# Patient Record
Sex: FEMALE | Race: BLACK OR AFRICAN AMERICAN | HISPANIC OR LATINO | Employment: PART TIME | URBAN - METROPOLITAN AREA
[De-identification: names, ages, dates, MRNs, and addresses within clinical notes are randomized per-mention and may not be internally consistent; named-entity substitution may affect disease eponyms.]

---

## 2020-07-06 ENCOUNTER — OFFICE VISIT (OUTPATIENT)
Dept: FAMILY MEDICINE CLINIC | Facility: CLINIC | Age: 55
End: 2020-07-06

## 2020-07-06 VITALS
BODY MASS INDEX: 29.38 KG/M2 | WEIGHT: 155.6 LBS | HEIGHT: 61 IN | DIASTOLIC BLOOD PRESSURE: 68 MMHG | SYSTOLIC BLOOD PRESSURE: 108 MMHG | OXYGEN SATURATION: 97 % | RESPIRATION RATE: 20 BRPM | HEART RATE: 78 BPM | TEMPERATURE: 98.2 F

## 2020-07-06 DIAGNOSIS — Z92.29 HISTORY OF BCG VACCINATION: ICD-10-CM

## 2020-07-06 DIAGNOSIS — Z11.1 SCREENING FOR TUBERCULOSIS: ICD-10-CM

## 2020-07-06 DIAGNOSIS — Z12.31 BREAST CANCER SCREENING BY MAMMOGRAM: ICD-10-CM

## 2020-07-06 DIAGNOSIS — Z00.00 ANNUAL PHYSICAL EXAM: Primary | ICD-10-CM

## 2020-07-06 DIAGNOSIS — Z13.31 DEPRESSION SCREEN: ICD-10-CM

## 2020-07-06 DIAGNOSIS — E66.3 OVERWEIGHT: ICD-10-CM

## 2020-07-06 DIAGNOSIS — G47.00 INSOMNIA, UNSPECIFIED TYPE: ICD-10-CM

## 2020-07-06 DIAGNOSIS — Z12.4 CERVICAL CANCER SCREENING: ICD-10-CM

## 2020-07-06 PROCEDURE — 99386 PREV VISIT NEW AGE 40-64: CPT | Performed by: PHYSICIAN ASSISTANT

## 2020-07-06 PROCEDURE — 3008F BODY MASS INDEX DOCD: CPT | Performed by: PHYSICIAN ASSISTANT

## 2020-07-06 RX ORDER — LANOLIN ALCOHOL/MO/W.PET/CERES
3 CREAM (GRAM) TOPICAL
Qty: 90 TABLET | Refills: 1 | Status: SHIPPED | OUTPATIENT
Start: 2020-07-06 | End: 2021-05-20

## 2020-07-06 NOTE — PATIENT INSTRUCTIONS
Insomnio   LO QUE NECESITA SABER:   El insomnio es tremayne afección que hace difícil conciliar el sueño o mantenerse dormido  La falta de sueño puede conllevar a problemas de atención o de la memoria donna el día  Usted también podría estar de malhumor, deprimido, torpe o tener mary de [place]  INSTRUCCIONES SOBRE EL [facility]AY HOSPITALARIA:   Pregúntele a acosta Twyla Broom vitaminas y minerales son adecuados para usted  · Maryjo síntomas no mejoran o Christal Rimes  · Josetta Hammock a usar drogas o alcohol para conciliar el sueño  · Usted tiene preguntas o inquietudes acerca de acosta condición o cuidado  Medicamentos:   · Medicamentos,  podrían ayudarle a dormir con más regularidad o ayudarlo a sentirse menos ansioso  · [place] maryjo medicamentos ronan se le haya indicado  Consulte con acosta médico si usted enrike que acosta medicamento no le está ayudando o si presenta efectos secundarios  Infórmele si es alérgico a cualquier medicamento  Mantenga tremayne lista actualizada de los [place], las vitaminas y los productos herbales que thi  Incluya los siguientes datos de los medicamentos: cantidad, frecuencia y motivo de administración  Traiga con usted la lista o los envases de la píldoras a maryjo citas de seguimiento  Lleve la lista de los medicamentos con usted en pau de tremayne emergencia  Qué puede hacer para mejorar el sueño:   · Establezca un horario para dormir  [place] le ayudará a formar tremayne rutina de sueño  Mantenga un registro de los patrones de sueño y cualquier problema para dormir que tenga  Lleve el registro a las citas de seguimiento con maryjo médicos  · No tome siestas  Las siestas podrían dificultarle que se quede dormido a la hora de acostarse por la noche  · Mantenga acosta habitación fresca, sin ruidos y [place]  [place] un ruido o tessy hurley ronan el del ventilador, para que lo relaje  No utilice acosta cama para ninguna actividad que lo mantenga despierto   No shannan ni clovis ejercicio, ni coma ni melba televisión en acosta habitación  · [place] de la cama si no se queda dormido dentro de 20 minutos  Brina Ku a otra habitación y clovis algo que lo relaje hasta que le de sueño  · Limite el consumo de cafeína, alcohol y de alimentos muy temprano en el día  Solo tome café en la mañana  No tome alcohol dentro de las 6 horas antes de [place]  No coma alimentos pesados andria antes de acostarse  · Realice actividad física con regularidad  El ejercicio diario podría ayudarlo a dormir mejor  No se ejercite dentro de las 4 horas antes de [place]  Acuda a angel consultas de control con brewster médico según le indicaron  Brewster médico podría referirlo a terapia cognitiva conductual  Un terapeuta de la conducta podría ayudarlo a encontrar mansoor gutierrez [place], de disminuir el estrés y de mejorar el sueño  Anote angel preguntas para que se acuerde de hacerlas donna angel visitas  © 2017 2600 Marc  Information is for End User's use only and may not be sold, redistributed or otherwise used for commercial purposes  All illustrations and images included in CareNotes® are the copyrighted property of A D A M , Inc  or Gatito Simmons  Esta información es sólo para uso en educación  Brewster intención no es darle un consejo médico sobre enfermedades o tratamientos  Colsulte con brewster Shama [place]orn[place] farmacéutico antes de seguir cualquier régimen médico para saber si es seguro y efectivo para usted  Melatonina (Por la boca)   Sirve para tratar el insomnio  Candis(s) : Basic's Melatonin, [facility] Melatonin, Nature's Blend Melatonin, Optimum Melatonin, PharmAssure Melatonin, Rite Aid Melatonin, [place] [employer] Melatonin   Existen muchas otras marcas de Nikita  Alexis medicamento no debe ser usado cuando:   No use alexis medicamento si alguna vez ha tenido [facility] reacción alérgica a la melatonina    [employer] de usar alexis medicamento:   Isha Castlilo Vira Jing liberación prolongada  · Acosta médico le indicara cuanto medicamento necesita usar  No use más medicamento de lo indicado  · [address] medicamento si usted está usando alexis medicamento sin prescripción médica  · [place] acosta dosis 20 minutos antes de acostarse  Usted puede tana acosta medicamento con o sin comida  · El líquido puede tomarse solo o [place] con [place]  Si tremayne dosis es olvidada:   · Llame al médico o al farmacéutico para recibir instrucciones si [employer] dosis  Forma de guardar y botar alexis medicamento:   · Guarde el medicamento en un recipiente cerrado a temperatura ambiente y alejado del calor, la humedad y la ramon directa  · Guarde todos los medicamentos fuera del alcance de los niños  Nunca comparta angel medicamentos con [employer]  · Bote las medicinas  Marlene de vencimiento haya expirado y las medicinas que ya no necesita siguiendo las instrucciones del farmacéutico, médico o paramédico   Medicamentos y alimentos que debe evitar:   Consulte con acosta médico o farmacéutico antes de usar cualquier medicamento, incluyendo los que compra sin receta médica, las vitaminas y los productos herbales  · No olvide informarle a acosta médico si también está usando medicamentos tranquilizantes o si también esta usando algún medicamento sedante  Precauciones donna el uso de alexis medicamento:   · No olvide informarle a acosta médico si está embarazada o dando de lactar (amamantando) o si tiene tremayne condición auto inmune  Infórmele también, si usted tiene tristeza o depresión  · Alexis medicamento puede causar somnolencia  Evite manejar automóvil, usar maquinarias, o hacer cualquier otra tarea que pueda ser peligrosa si usted no está alerta  Efectos secundarios que pueden presentarse donna el uso de alexis medicamento:   Consulte con el médico si nota los siguientes efectos secundarios menos graves:  · Sentir mucha pereza o cansancio en las [place]  · Dolor de raffaele    Consulte con el médico si nota otros efectos secundarios que enrike son causados por alexis medicamento  Llame a brewster médico para consultarle Teresita Patten puede notificar angel efectos secundarios al FDA al [phone]  © 2017 2600 Marc Peres Information is for End User's use only and may not be sold, redistributed or otherwise used for commercial purposes  Esta información es sólo para uso en educación  Brewster intención no es darle un consejo médico sobre enfermedades o tratamientos  Colsulte con brewster [employer] farmacéutico antes de seguir cualquier régimen médico para saber si es seguro y efectivo para usted  [address] [facility]  Phone Number: [phone]  - Please call to schedule an appointment  Thanks!

## 2020-07-06 NOTE — PROGRESS NOTES
[facility]    NAME: Wyatt Bailey  AGE: 49 y o  SEX: female  : 1965     DATE: 2020     Assessment and Plan:     Problem List Items Addressed This Visit        Other    Insomnia    Relevant Medications    melatonin 3 mg    History of BCG vaccination      Other Visit Diagnoses     Annual physical exam    -  Primary    Relevant Orders    CBC and differential    Comprehensive metabolic panel    Lipid panel    TSH, 3rd generation with Free T4 reflex    Hemoglobin A1C    Depression screen        Screening for tuberculosis        Relevant Orders    XR chest pa & lateral    Breast cancer screening by mammogram        Relevant Orders    Mammo screening bilateral w cad    Cervical cancer screening        Relevant Orders    Ambulatory referral to Obstetrics / Gynecology    Overweight            Screening for tuberculosis/history of BCG vaccination/employment physical  - Patient's employer requires tuberculosis screening   - Due to history of BCG vaccination, will order chest x-ray to screen for tuberculosis  Insomnia  - Reviewed proper sleep hygiene with patient  - Will prescribe melatonin 3 mg, to be taken once daily at bedtime  Advised patient if 3 mg is not effective, then she could try taking 2 tablets for total of 6 mg     - Will continue to monitor  Depression Screen  - Depression Screening Follow-up Plan: Patient's depression screening was positive with a PHQ-2 score of 0  Their PHQ-9 score was not indicated  Clinically patient does not have depression  No treatment is required  Immunizations and preventive care screenings were discussed with patient today  Appropriate education was printed on patient's after visit summary  Counseling:  Alcohol/drug use: discussed moderation in alcohol intake, the recommendations for healthy alcohol use, and avoidance of illicit drug use    Dental Health: discussed importance of regular tooth brushing, flossing, and dental visits  Injury prevention: discussed safety/seat belts, safety helmets, smoke detectors, carbon dioxide detectors, and smoking near bedding or upholstery  Sexual health: discussed sexually transmitted diseases, partner selection, use of condoms, avoidance of unintended pregnancy, and contraceptive alternatives  · Exercise: the importance of regular exercise/physical activity was discussed  Recommend exercise 3-5 times per week for at least 30 minutes  BMI Counseling: Body mass index is 29 89 kg/m²  The BMI is above normal  Nutrition recommendations include decreasing portion sizes, encouraging healthy choices of fruits and vegetables, consuming healthier snacks, moderation in carbohydrate intake and increasing intake of lean protein  Exercise recommendations include moderate physical activity 150 minutes/week  No pharmacotherapy was ordered  Return in about 1 year (around 7/6/2021) for Annual physical      Chief Complaint:     Chief Complaint   Patient presents with    Annual Exam      History of Present Illness:     Adult Annual Physical   Patient here for a comprehensive physical exam/establish care/work physical   Patient notes she requires to have tuberculosis screening completed for her work physical   Patient notes she has history of BCG vaccine, so she usually gets a chest x-ray completed instead  Diet and Physical Activity  · Diet/Nutrition: Average  · Exercise: walking  Depression Screening  PHQ-9 Depression Screening    PHQ-9:    Frequency of the following problems over the past two weeks:       Little interest or pleasure in doing things:  0 - not at all  Feeling down, depressed, or hopeless:  0 - not at all  PHQ-2 Score:  0       General Health  · Sleep: Patient notes for the past 2 years she has been having difficulty sleeping  Patient notes she has difficulty falling asleep    Patient notes she will go to bed and then it will usually take a few hours before she actually falls asleep  Patient notes sometimes she does not sleep at all    Patient notes she has tried reading on her phone, reading a book, reading the Bible, and listening to music, but nothing has been helpful  · Hearing: normal - bilateral   · Vision: wears glasses  · Dental: no dental visits for >1 year  /GYN Health  · Patient notes about 10-11 years ago she had a "terrible disease" of her ovary and had both of her ovaries removed  Breast Cancer Screening  · Patient notes her last mammogram was in 2019  Cervical Cancer Screening  · Patient notes her last PAP was a few years ago  Colon Cancer Screening  · Patient notes she had a colonoscopy completed about 3 years ago when she was living in [facility]  Patient notes they told her they found polyps and would repeat the colonoscopy in a few years  Review of Systems:     Review of Systems   Constitutional: Negative for appetite change, fatigue and fever  HENT: Negative for congestion, ear pain, rhinorrhea and sore throat  Eyes: Negative for pain  Respiratory: Negative for cough, chest tightness and shortness of breath  Cardiovascular: Negative for chest pain and palpitations  Gastrointestinal: Negative for abdominal pain, constipation, diarrhea, nausea and vomiting  Genitourinary: Negative for difficulty urinating and dysuria  Musculoskeletal: Negative for arthralgias  Skin: Negative for rash  Neurological: Negative for dizziness and headaches  Psychiatric/Behavioral: The patient is not nervous/anxious  Past Medical History:     History reviewed  No pertinent past medical history     Past Surgical History:     Past Surgical History:   Procedure Laterality Date    BILATERAL OOPHORECTOMY Bilateral      SECTION      x2      Social History:     Social History     Socioeconomic History    Marital status: Single     Spouse name: None    Number of children: None    Years of education: None    Highest education level: None   Occupational History    None   Social Needs    Financial resource strain: Not very hard    Food insecurity:     Worry: Sometimes true     Inability: Sometimes true   [place] needs:     Medical: None     Non-medical: None   Tobacco Use    Smoking status: Never Smoker    Smokeless tobacco: Never Used   Substance and Sexual Activity    Alcohol use: Not Currently    Drug use: Never    Sexual activity: None   Lifestyle    Physical activity:     Days per week: None     Minutes per session: None    Stress: None   Relationships    Social connections:     Talks on phone: None     Gets together: None     Attends Jew service: None     Active member of club or organization: None     Attends meetings of clubs or organizations: None     Relationship status: None    Intimate partner violence:     Fear of current or ex partner: None     Emotionally abused: None     Physically abused: None     Forced sexual activity: None   Other Topics Concern    None   Social History Narrative    None      Family History:     History reviewed  No pertinent family history  Current Medications:     Current Outpatient Medications   Medication Sig Dispense Refill    melatonin 3 mg Take 1 tablet (3 mg total) by mouth daily at bedtime 90 tablet 1     No current facility-administered medications for this visit  Allergies:     No Known Allergies   Physical Exam:     /68 (BP Location: Right arm, Patient Position: Sitting, Cuff Size: Adult)   Pulse 78   Temp 98 2 °F (36 8 °C) (Temporal)   Resp 20   Ht 5' 0 5" (1 537 m)   Wt 70 6 kg (155 lb 9 6 oz)   SpO2 97%   BMI 29 89 kg/m²     Physical Exam   Constitutional: She is oriented to person, place, and time  She appears well-developed and well-nourished  No distress  HENT:   Head: Normocephalic and atraumatic     Right Ear: External ear normal    Left Ear: External ear normal  Nose: Nose normal    Mouth/Throat: Uvula is midline, oropharynx is clear and moist and mucous membranes are normal    Eyes: Pupils are equal, round, and reactive to light  Conjunctivae and EOM are normal    Neck: Normal range of motion  Neck supple  Cardiovascular: Normal rate, regular rhythm, normal heart sounds and intact distal pulses  No murmur heard  Pulmonary/Chest: Effort normal and breath sounds normal  She has no wheezes  Abdominal: Soft  Bowel sounds are normal  There is no tenderness  Musculoskeletal: Normal range of motion  Neurological: She is alert and oriented to person, place, and time  Skin: Skin is warm and dry  Psychiatric: She has a normal mood and affect  Her speech is normal and behavior is normal    Nursing note and vitals reviewed  - Utilized [employer] for translation      CAREY Tatum

## 2020-07-07 ENCOUNTER — APPOINTMENT (OUTPATIENT)
Dept: LAB | Facility: HOSPITAL | Age: 55
End: 2020-07-07
Payer: COMMERCIAL

## 2020-07-07 ENCOUNTER — HOSPITAL ENCOUNTER (OUTPATIENT)
Dept: RADIOLOGY | Facility: HOSPITAL | Age: 55
Discharge: HOME | End: 2020-07-07
Payer: COMMERCIAL

## 2020-07-07 DIAGNOSIS — Z11.1 SCREENING FOR TUBERCULOSIS: ICD-10-CM

## 2020-07-07 DIAGNOSIS — Z00.00 ANNUAL PHYSICAL EXAM: ICD-10-CM

## 2020-07-07 LAB
ALBUMIN SERPL BCP-MCNC: 4 G/DL (ref 3–5.2)
ALP SERPL-CCNC: 69 U/L (ref 43–122)
ALT SERPL W P-5'-P-CCNC: 28 U/L (ref 9–52)
ANION GAP SERPL CALCULATED.3IONS-SCNC: 8 MMOL/L (ref 5–14)
AST SERPL W P-5'-P-CCNC: 32 U/L (ref 14–36)
BASOPHILS # BLD AUTO: 0.1 THOUSANDS/ΜL (ref 0–0.1)
BASOPHILS NFR BLD AUTO: 1 % (ref 0–1)
BILIRUB SERPL-MCNC: 0.4 MG/DL
BUN SERPL-MCNC: 20 MG/DL (ref 5–25)
CALCIUM SERPL-MCNC: 8.8 MG/DL (ref 8.4–10.2)
CHLORIDE SERPL-SCNC: 104 MMOL/L (ref 97–108)
CHOLEST SERPL-MCNC: 242 MG/DL
CO2 SERPL-SCNC: 26 MMOL/L (ref 22–30)
CREAT SERPL-MCNC: 0.63 MG/DL (ref 0.6–1.2)
EOSINOPHIL # BLD AUTO: 0.3 THOUSAND/ΜL (ref 0–0.4)
EOSINOPHIL NFR BLD AUTO: 6 % (ref 0–6)
ERYTHROCYTE [DISTWIDTH] IN BLOOD BY AUTOMATED COUNT: 13.8 %
EST. AVERAGE GLUCOSE BLD GHB EST-MCNC: 117 MG/DL
GFR SERPL CREATININE-BSD FRML MDRD: 118 ML/MIN/1.73SQ M
GLUCOSE P FAST SERPL-MCNC: 103 MG/DL (ref 70–99)
HBA1C MFR BLD: 5.7 %
HCT VFR BLD AUTO: 38.3 % (ref 36–46)
HDLC SERPL-MCNC: 52 MG/DL
HGB BLD-MCNC: 12.7 G/DL (ref 12–16)
LDLC SERPL CALC-MCNC: 170 MG/DL
LYMPHOCYTES # BLD AUTO: 2.1 THOUSANDS/ΜL (ref 0.5–4)
LYMPHOCYTES NFR BLD AUTO: 40 % (ref 25–45)
MCH RBC QN AUTO: 27.8 PG (ref 26–34)
MCHC RBC AUTO-ENTMCNC: 33.1 G/DL (ref 31–36)
MCV RBC AUTO: 84 FL (ref 80–100)
MONOCYTES # BLD AUTO: 0.5 THOUSAND/ΜL (ref 0.2–0.9)
MONOCYTES NFR BLD AUTO: 9 % (ref 1–10)
NEUTROPHILS # BLD AUTO: 2.4 THOUSANDS/ΜL (ref 1.8–7.8)
NEUTS SEG NFR BLD AUTO: 44 % (ref 45–65)
NONHDLC SERPL-MCNC: 190 MG/DL
PLATELET # BLD AUTO: 254 THOUSANDS/UL (ref 150–450)
PMV BLD AUTO: 9.6 FL (ref 8.9–12.7)
POTASSIUM SERPL-SCNC: 3.9 MMOL/L (ref 3.6–5)
PROT SERPL-MCNC: 7.4 G/DL (ref 5.9–8.4)
RBC # BLD AUTO: 4.56 MILLION/UL (ref 4–5.2)
SODIUM SERPL-SCNC: 138 MMOL/L (ref 137–147)
TRIGL SERPL-MCNC: 101 MG/DL
TSH SERPL DL<=0.05 MIU/L-ACNC: 1.54 UIU/ML (ref 0.47–4.68)
WBC # BLD AUTO: 5.3 THOUSAND/UL (ref 4.5–11)

## 2020-07-07 PROCEDURE — 80061 LIPID PANEL: CPT

## 2020-07-07 PROCEDURE — 84443 ASSAY THYROID STIM HORMONE: CPT

## 2020-07-07 PROCEDURE — 83036 HEMOGLOBIN GLYCOSYLATED A1C: CPT

## 2020-07-07 PROCEDURE — 36415 COLL VENOUS BLD VENIPUNCTURE: CPT

## 2020-07-07 PROCEDURE — 80053 COMPREHEN METABOLIC PANEL: CPT

## 2020-07-07 PROCEDURE — 71046 X-RAY EXAM CHEST 2 VIEWS: CPT

## 2020-07-07 PROCEDURE — 85025 COMPLETE CBC W/AUTO DIFF WBC: CPT

## 2020-07-20 ENCOUNTER — TELEPHONE (OUTPATIENT)
Dept: FAMILY MEDICINE CLINIC | Facility: CLINIC | Age: 55
End: 2020-07-20

## 2020-07-27 NOTE — TELEPHONE ENCOUNTER
Patient stopped by the office wanting to know the status of her paperwork  She needs it asap or she will not be able to work

## 2020-07-28 NOTE — TELEPHONE ENCOUNTER
Pt came in stating that she needs her form by tomorrow morning  I made another copy and placing it in your bin today just in case

## 2020-08-04 ENCOUNTER — TELEPHONE (OUTPATIENT)
Dept: FAMILY MEDICINE CLINIC | Facility: CLINIC | Age: 55
End: 2020-08-04

## 2020-09-01 ENCOUNTER — OFFICE VISIT (OUTPATIENT)
Dept: FAMILY MEDICINE CLINIC | Facility: CLINIC | Age: 55
End: 2020-09-01

## 2020-09-01 VITALS
HEART RATE: 71 BPM | DIASTOLIC BLOOD PRESSURE: 68 MMHG | SYSTOLIC BLOOD PRESSURE: 118 MMHG | OXYGEN SATURATION: 97 % | BODY MASS INDEX: 31.22 KG/M2 | TEMPERATURE: 97.2 F | HEIGHT: 60 IN | WEIGHT: 159 LBS | RESPIRATION RATE: 20 BRPM

## 2020-09-01 DIAGNOSIS — Z71.2 ENCOUNTER TO DISCUSS TEST RESULTS: Primary | ICD-10-CM

## 2020-09-01 DIAGNOSIS — Z12.31 BREAST CANCER SCREENING BY MAMMOGRAM: ICD-10-CM

## 2020-09-01 PROCEDURE — 99212 OFFICE O/P EST SF 10 MIN: CPT | Performed by: PHYSICIAN ASSISTANT

## 2020-09-01 PROCEDURE — 3008F BODY MASS INDEX DOCD: CPT | Performed by: PHYSICIAN ASSISTANT

## 2020-09-01 PROCEDURE — 1036F TOBACCO NON-USER: CPT | Performed by: PHYSICIAN ASSISTANT

## 2020-09-01 NOTE — PATIENT INSTRUCTIONS
Hiperlipidemia   CUIDADO AMBULATORIO:   Hiperlipidemia  son los [address] de lípidos ([facility]) en brewster bret  Estos lípidos incluyen al colesterol o triglicéridos  Los lípidos son producidos por brewster cuerpo  También provienen de los Artur Dias usted consume  Brewster cuerpo necesita lípidos para funcionar correctamente, donna los niveles altos aumentan brewster riesgo de enfermedad cardíaca, ataque al corazón y de un derrame cerebral    Llame al 911 en pau de presentar lo siguiente:   · Usted tiene alguno de los siguientes signos de un ataque cardíaco:      ¨ Estornudos, presión, o dolor en brewster pecho que dura mas de 5 minutos o regresa  ¨ Malestar o dolor en brewster espalda, faheem, mandíbula, abdomen, o brazo     ¨ Dificultad para respirar    ¨ Náuseas o vómito    ¨ Siente un desvanecimiento o tiene sudores fríos especialmente en el pecho o dificultad para respirar  · Usted tiene alguno de los siguientes signos de derrame cerebral:      ¨ Adormecimiento o caída de un lado de brewster yvette     ¨ Debilidad en un brazo o tremayne pierna    ¨ Confusión o debilidad para hablar    ¨ Mareos o dolor de raffaele intenso, o pérdida de la visión  Pregúntele a brewster [place] vitaminas y minerales son adecuados para usted  · Usted tiene preguntas o inquietudes acerca de brewster condición o cuidado  El tratamiento de la hiperlipidemia  puede incluir cambios en brewster estilo de milvia para ayudarlo a disminuir los niveles de los lípidos  Es posible que usted también necesite tana medicamentos para disminuir angel niveles de lípidos  Algunos de los cambios en brewster estilo de milvia que podrían ser necesarios incluyen los siguientes:  · Mantenga un peso saludable  Consulte con brewster médico cuánto debería pesar  [place] que lo ayude a crear un plan para bajar de peso si tiene sobrepeso  La pérdida de peso puede disminuir angel niveles de colesterol y triglicéridos  · Ejercítese según indicaciones    El ejercicio reduce los niveles de colesterol y lo [place] a mantener un peso saludable  Zac 40 minutos o más de ejercicio [facility] 3 y 4 días cada semana  Puede dividir el ejercicio en cuatro entrenamientos de 10 minutos en vez de 40 minutos a la vez  Los ejemplos de ejercicio moderado incluyen caminar enérgicamente, nadar o montar en bicicleta  Trabaje con acosta médico para planificar el mejor programa de ejercicios para usted  · No fume  La nicotina y otros químicos de los cigarrillos y cigarros pueden aumentar el riesgo de ataque cardíaco y accidente cerebrovascular  Pida información a acosta médico si usted actualmente fuma y necesita ayuda para dejar de fumar  Los cigarrillos electrónicos o tabaco sin humo todavía contienen nicotina  Consulte con acosta médico antes de [employer]  · Consuma alimentos saludables para el corazón  Hable con acosta dietista acerca de tremayne dieta saludable para el corazón  Lo siguiente le ayudará a controlar acosta hiperlipidemia:     ¨ Reduzca la cantidad total de grasa que usted consume  Elija sana magras, leche descremada o con 1% de [place] y productos lácteos bajos en grasa, ronan yogur y [place]  Limite o evite el consumo de carne Daniel Manzanares  La carne rubi es kranthi en grasas y colesterol  [id number] [facility] grasas no saludables por grasa saludables  Las grasa que no son saludables incluyen a las grasas saturadas, grasas transgénicas y el colesterol  Elija margarinas suaves que velia bajas en grasas saturadas y que tengan poca o nada de grasas transgénicas  Las grasas monoinsaturadas son grasas saludables  Estas se encuentran en el aceite de russell, el aceite de canola, el aguacate y los sanjuanita secos  Las grasas poliinsaturadas también son saludables  Estas se encuentran en el pescado, la linaza, las nueces y la soya  ¨ Consuma frutas y [employer]  Estas son bajas en calorías y [address] y son Jeffery José Miguel buena vidal de vitaminas esenciales  [address] verduras de [employer] oscuro, jorge y anaranjado   Los ejemplos incluyen espinaca, col rizada, brócoli y zanahorias  ¨ [place] alimentos ricos en fibras  Elija alimentos de granos enteros y [employer]  Las buenas larios [place] [employer] panes integrales o los cereales, los frijoles, chícharos, frutas y verduras  · Pregunte a acosta médico si usted puede tana alcohol  El alcohol puede aumentar acosta presión arterial y los niveles de triglicéridos  Un trago equivale a 12 onzas de cerveza, 5 onzas de vino o 1 onza y ½ de licor  Acuda a angel consultas de control con acosta médico según le indicaron  Es posible que necesite regresar para que le realicen más exámenes  Acosta médico podría referirlo a un nutriólogo  Anote angel preguntas para que se acuerde de hacerlas donna angel visitas  © 2017 26[address] Marc  Information is for End User's use only and may not be sold, redistributed or otherwise used for commercial purposes  All illustrations and images included in CareNotes® are the copyrighted property of A D A M , Inc  or Gatito Simmons  Esta información es sólo para uso en educación  Acosta intención no es darle un consejo médico sobre enfermedades o tratamientos  Colsulte con acosta Justin Pinta farmacéutico antes de seguir cualquier régimen médico para saber si es seguro y efectivo para usted

## 2020-09-01 NOTE — PROGRESS NOTES
Assessment/Plan:    - Reviewed blood work results with patient  Overall, blood work is normal other than mildly elevated cholesterol levels  Advised patient to follow low-fat diet and to exercise regularly, at least 30 minutes a day for 5 days a week  Will continue to monitor  Diagnoses and all orders for this visit:    Encounter to discuss test results    Breast cancer screening by mammogram  -     Mammo screening bilateral w cad; Future      All of patients questions were answered  Patient understands and agrees with the above plan  Return as needed  Madeline Lee PA-C  09/01/20  [facility]           Subjective:     Patient ID: Dima Castillo  is a 49 y o  female with known who presents today in office for blood work results  - Patient is a 49 y o  female who presents today for blood work results  Patient had recent blood work completed and would like to review the results today  Also, patient would like to have her mammogram completed  The following portions of the patient's history were reviewed and updated as appropriate: allergies, current medications, past family history, past medical history, past social history, past surgical history and problem list         Review of Systems   Constitutional: Negative for appetite change, fatigue and fever  HENT: Negative for congestion, ear pain, rhinorrhea and sore throat  Eyes: Negative for pain  Respiratory: Negative for cough, chest tightness and shortness of breath  Cardiovascular: Negative for chest pain and palpitations  Gastrointestinal: Negative for abdominal pain, constipation, diarrhea, nausea and vomiting  Genitourinary: Negative for difficulty urinating and dysuria  Musculoskeletal: Negative for myalgias  Skin: Negative for rash  Neurological: Negative for dizziness and headaches  Psychiatric/Behavioral: The patient is not nervous/anxious                    Objective:   Vitals:    09/01/20 1110   BP: 118/68 BP Location: Right arm   Patient Position: Sitting   Cuff Size: Adult   Pulse: 71   Resp: 20   Temp: (!) 97 2 °F (36 2 °C)   TempSrc: Temporal   SpO2: 97%   Weight: 72 1 kg (159 lb)   Height: 5' 0 05" (1 525 m)         Physical Exam  Vitals signs and nursing note reviewed  Constitutional:       General: She is not in acute distress  Appearance: She is well-developed  HENT:      Head: Normocephalic and atraumatic  Right Ear: External ear normal       Left Ear: External ear normal       Nose: Nose normal       Mouth/Throat:      Pharynx: Uvula midline  Eyes:      Conjunctiva/sclera: Conjunctivae normal    Neck:      Musculoskeletal: Normal range of motion and neck supple  Cardiovascular:      Rate and Rhythm: Normal rate and regular rhythm  Heart sounds: Normal heart sounds  No murmur  Pulmonary:      Effort: Pulmonary effort is normal       Breath sounds: Normal breath sounds  No wheezing  Skin:     General: Skin is warm and dry  Neurological:      Mental Status: She is alert and oriented to person, place, and time  Psychiatric:         Speech: Speech normal          Behavior: Behavior normal            - Utilized [employer] for translation

## 2020-11-27 ENCOUNTER — HOSPITAL ENCOUNTER (EMERGENCY)
Facility: HOSPITAL | Age: 55
Discharge: HOME | End: 2020-11-27
Attending: EMERGENCY MEDICINE | Admitting: EMERGENCY MEDICINE
Payer: COMMERCIAL

## 2020-11-27 VITALS
SYSTOLIC BLOOD PRESSURE: 101 MMHG | HEART RATE: 82 BPM | RESPIRATION RATE: 20 BRPM | WEIGHT: 157.5 LBS | BODY MASS INDEX: 30.71 KG/M2 | DIASTOLIC BLOOD PRESSURE: 66 MMHG | OXYGEN SATURATION: 96 % | TEMPERATURE: 99.9 F

## 2020-11-27 DIAGNOSIS — U07.1 COVID-19: Primary | ICD-10-CM

## 2020-11-27 LAB
FLUAV RNA RESP QL NAA+PROBE: NEGATIVE
FLUBV RNA RESP QL NAA+PROBE: NEGATIVE
RSV RNA RESP QL NAA+PROBE: NEGATIVE
SARS-COV-2 RNA RESP QL NAA+PROBE: POSITIVE

## 2020-11-27 PROCEDURE — 99284 EMERGENCY DEPT VISIT MOD MDM: CPT | Performed by: EMERGENCY MEDICINE

## 2020-11-27 PROCEDURE — 99283 EMERGENCY DEPT VISIT LOW MDM: CPT

## 2020-11-27 PROCEDURE — 96372 THER/PROPH/DIAG INJ SC/IM: CPT

## 2020-11-27 PROCEDURE — 0241U HB NFCT DS VIR RESP RNA 4 TRGT: CPT | Performed by: EMERGENCY MEDICINE

## 2020-11-27 RX ORDER — KETOROLAC TROMETHAMINE 30 MG/ML
30 INJECTION, SOLUTION INTRAMUSCULAR; INTRAVENOUS ONCE
Status: DISCONTINUED | OUTPATIENT
Start: 2020-11-27 | End: 2020-11-27

## 2020-11-27 RX ORDER — KETOROLAC TROMETHAMINE 30 MG/ML
30 INJECTION, SOLUTION INTRAMUSCULAR; INTRAVENOUS ONCE
Status: COMPLETED | OUTPATIENT
Start: 2020-11-27 | End: 2020-11-27

## 2020-11-27 RX ADMIN — KETOROLAC TROMETHAMINE 30 MG: 30 INJECTION, SOLUTION INTRAMUSCULAR; INTRAVENOUS at 11:19

## 2020-11-29 ENCOUNTER — APPOINTMENT (EMERGENCY)
Dept: RADIOLOGY | Facility: HOSPITAL | Age: 55
End: 2020-11-29
Payer: COMMERCIAL

## 2020-11-29 ENCOUNTER — HOSPITAL ENCOUNTER (EMERGENCY)
Facility: HOSPITAL | Age: 55
Discharge: HOME | End: 2020-11-29
Attending: EMERGENCY MEDICINE
Payer: COMMERCIAL

## 2020-11-29 VITALS
BODY MASS INDEX: 30.52 KG/M2 | OXYGEN SATURATION: 97 % | RESPIRATION RATE: 16 BRPM | WEIGHT: 156.53 LBS | DIASTOLIC BLOOD PRESSURE: 62 MMHG | HEART RATE: 76 BPM | SYSTOLIC BLOOD PRESSURE: 134 MMHG | TEMPERATURE: 98 F

## 2020-11-29 DIAGNOSIS — U07.1 COVID-19 VIRUS INFECTION: Primary | ICD-10-CM

## 2020-11-29 DIAGNOSIS — D72.819 LEUKOPENIA: ICD-10-CM

## 2020-11-29 LAB
ALBUMIN SERPL BCP-MCNC: 3.4 G/DL (ref 3.5–5)
ALP SERPL-CCNC: 69 U/L (ref 46–116)
ALT SERPL W P-5'-P-CCNC: 39 U/L (ref 12–78)
ANION GAP SERPL CALCULATED.3IONS-SCNC: 5 MMOL/L (ref 4–13)
AST SERPL W P-5'-P-CCNC: 27 U/L (ref 5–45)
BASOPHILS # BLD AUTO: 0.01 THOUSANDS/ΜL (ref 0–0.1)
BASOPHILS NFR BLD AUTO: 0 % (ref 0–1)
BILIRUB SERPL-MCNC: 0.24 MG/DL (ref 0.2–1)
BILIRUB UR QL STRIP: NEGATIVE
BUN SERPL-MCNC: 13 MG/DL (ref 5–25)
CALCIUM ALBUM COR SERPL-MCNC: 9.1 MG/DL (ref 8.3–10.1)
CALCIUM SERPL-MCNC: 8.6 MG/DL (ref 8.3–10.1)
CHLORIDE SERPL-SCNC: 103 MMOL/L (ref 100–108)
CLARITY UR: CLEAR
CLARITY, POC: CLEAR
CO2 SERPL-SCNC: 30 MMOL/L (ref 21–32)
COLOR UR: YELLOW
COLOR, POC: YELLOW
CREAT SERPL-MCNC: 0.79 MG/DL (ref 0.6–1.3)
EOSINOPHIL # BLD AUTO: 0.05 THOUSAND/ΜL (ref 0–0.61)
EOSINOPHIL NFR BLD AUTO: 2 % (ref 0–6)
ERYTHROCYTE [DISTWIDTH] IN BLOOD BY AUTOMATED COUNT: 13.8 % (ref 11.6–15.1)
GFR SERPL CREATININE-BSD FRML MDRD: 98 ML/MIN/1.73SQ M
GLUCOSE SERPL-MCNC: 98 MG/DL (ref 65–140)
GLUCOSE UR STRIP-MCNC: NEGATIVE MG/DL
HCT VFR BLD AUTO: 41.9 % (ref 34.8–46.1)
HGB BLD-MCNC: 13.2 G/DL (ref 11.5–15.4)
HGB UR QL STRIP.AUTO: NEGATIVE
IMM GRANULOCYTES # BLD AUTO: 0.01 THOUSAND/UL (ref 0–0.2)
IMM GRANULOCYTES NFR BLD AUTO: 0 % (ref 0–2)
KETONES UR STRIP-MCNC: NEGATIVE MG/DL
LEUKOCYTE ESTERASE UR QL STRIP: NEGATIVE
LIPASE SERPL-CCNC: 150 U/L (ref 73–393)
LYMPHOCYTES # BLD AUTO: 1.78 THOUSANDS/ΜL (ref 0.6–4.47)
LYMPHOCYTES NFR BLD AUTO: 59 % (ref 14–44)
MCH RBC QN AUTO: 26.6 PG (ref 26.8–34.3)
MCHC RBC AUTO-ENTMCNC: 31.5 G/DL (ref 31.4–37.4)
MCV RBC AUTO: 85 FL (ref 82–98)
MONOCYTES # BLD AUTO: 0.47 THOUSAND/ΜL (ref 0.17–1.22)
MONOCYTES NFR BLD AUTO: 16 % (ref 4–12)
NEUTROPHILS # BLD AUTO: 0.68 THOUSANDS/ΜL (ref 1.85–7.62)
NEUTS SEG NFR BLD AUTO: 23 % (ref 43–75)
NITRITE UR QL STRIP: NEGATIVE
NRBC BLD AUTO-RTO: 0 /100 WBCS
PH UR STRIP.AUTO: 6 [PH] (ref 4.5–8)
PLATELET # BLD AUTO: 226 THOUSANDS/UL (ref 149–390)
PMV BLD AUTO: 10.7 FL (ref 8.9–12.7)
POTASSIUM SERPL-SCNC: 3.9 MMOL/L (ref 3.5–5.3)
PROT SERPL-MCNC: 7.4 G/DL (ref 6.4–8.2)
PROT UR STRIP-MCNC: NEGATIVE MG/DL
RBC # BLD AUTO: 4.96 MILLION/UL (ref 3.81–5.12)
SODIUM SERPL-SCNC: 138 MMOL/L (ref 136–145)
SP GR UR STRIP.AUTO: 1.02 (ref 1–1.03)
UROBILINOGEN UR QL STRIP.AUTO: 0.2 E.U./DL
WBC # BLD AUTO: 3 THOUSAND/UL (ref 4.31–10.16)

## 2020-11-29 PROCEDURE — 99284 EMERGENCY DEPT VISIT MOD MDM: CPT

## 2020-11-29 PROCEDURE — 83690 ASSAY OF LIPASE: CPT | Performed by: PHYSICIAN ASSISTANT

## 2020-11-29 PROCEDURE — 81003 URINALYSIS AUTO W/O SCOPE: CPT

## 2020-11-29 PROCEDURE — 71045 X-RAY EXAM CHEST 1 VIEW: CPT

## 2020-11-29 PROCEDURE — 85025 COMPLETE CBC W/AUTO DIFF WBC: CPT | Performed by: PHYSICIAN ASSISTANT

## 2020-11-29 PROCEDURE — 36415 COLL VENOUS BLD VENIPUNCTURE: CPT | Performed by: PHYSICIAN ASSISTANT

## 2020-11-29 PROCEDURE — 96374 THER/PROPH/DIAG INJ IV PUSH: CPT

## 2020-11-29 PROCEDURE — 99285 EMERGENCY DEPT VISIT HI MDM: CPT | Performed by: EMERGENCY MEDICINE

## 2020-11-29 PROCEDURE — 96375 TX/PRO/DX INJ NEW DRUG ADDON: CPT

## 2020-11-29 PROCEDURE — 80053 COMPREHEN METABOLIC PANEL: CPT | Performed by: PHYSICIAN ASSISTANT

## 2020-11-29 RX ORDER — MELATONIN
2000 DAILY
Qty: 28 TABLET | Refills: 0 | Status: SHIPPED | OUTPATIENT
Start: 2020-11-29

## 2020-11-29 RX ORDER — MULTIVITAMIN
1 TABLET ORAL DAILY
Qty: 14 TABLET | Refills: 0 | Status: SHIPPED | OUTPATIENT
Start: 2020-11-29

## 2020-11-29 RX ORDER — MULTIVIT WITH MINERALS/LUTEIN
1000 TABLET ORAL EVERY 12 HOURS
Qty: 28 TABLET | Refills: 0 | Status: SHIPPED | OUTPATIENT
Start: 2020-11-29

## 2020-11-29 RX ORDER — ACETAMINOPHEN 325 MG/1
650 TABLET ORAL EVERY 6 HOURS PRN
Qty: 30 TABLET | Refills: 0 | Status: SHIPPED | OUTPATIENT
Start: 2020-11-29 | End: 2021-05-20

## 2020-11-29 RX ORDER — KETOROLAC TROMETHAMINE 30 MG/ML
15 INJECTION, SOLUTION INTRAMUSCULAR; INTRAVENOUS ONCE
Status: COMPLETED | OUTPATIENT
Start: 2020-11-29 | End: 2020-11-29

## 2020-11-29 RX ORDER — ONDANSETRON 2 MG/ML
4 INJECTION INTRAMUSCULAR; INTRAVENOUS ONCE
Status: COMPLETED | OUTPATIENT
Start: 2020-11-29 | End: 2020-11-29

## 2020-11-29 RX ORDER — ONDANSETRON 4 MG/1
4 TABLET, FILM COATED ORAL EVERY 8 HOURS PRN
Qty: 20 TABLET | Refills: 0 | Status: SHIPPED | OUTPATIENT
Start: 2020-11-29 | End: 2021-05-20

## 2020-11-29 RX ADMIN — KETOROLAC TROMETHAMINE 15 MG: 30 INJECTION, SOLUTION INTRAMUSCULAR at 17:13

## 2020-11-29 RX ADMIN — ONDANSETRON 4 MG: 2 INJECTION INTRAMUSCULAR; INTRAVENOUS at 17:12

## 2021-04-12 ENCOUNTER — OFFICE VISIT (OUTPATIENT)
Dept: FAMILY MEDICINE CLINIC | Facility: CLINIC | Age: 56
End: 2021-04-12

## 2021-04-12 VITALS
TEMPERATURE: 98.3 F | BODY MASS INDEX: 31.59 KG/M2 | DIASTOLIC BLOOD PRESSURE: 66 MMHG | HEART RATE: 74 BPM | HEIGHT: 60 IN | OXYGEN SATURATION: 96 % | SYSTOLIC BLOOD PRESSURE: 118 MMHG | WEIGHT: 160.9 LBS | RESPIRATION RATE: 20 BRPM

## 2021-04-12 DIAGNOSIS — Z12.11 COLON CANCER SCREENING: Primary | ICD-10-CM

## 2021-04-12 DIAGNOSIS — R10.84 GENERALIZED ABDOMINAL PAIN: ICD-10-CM

## 2021-04-12 PROCEDURE — 99213 OFFICE O/P EST LOW 20 MIN: CPT | Performed by: PHYSICIAN ASSISTANT

## 2021-04-12 NOTE — PROGRESS NOTES
Assessment/Plan:    Abdominal pain/ colon cancer screening   - Per patient, she had her last colonoscopy completed about 4 years ago in [facility] was advised to complete another colonoscopy in 4 years  Per patient, she also has history of H pylori     - Patient has been experiencing abdominal pain and would like to be further evaluated by Gastroenterology  - Will place referral to gastroenterology for further evaluation and management  Diagnoses and all orders for this visit:    Colon cancer screening  -     Ambulatory referral to Gastroenterology; Future    Generalized abdominal pain  -     Ambulatory referral to Gastroenterology; Future          All of patients questions were answered  Patient understands and agrees with the above plan  Return as needed  Joanne Connors PA-C  04/12/21  [facility] [place]           Subjective:     Patient ID: Anibal Sauer  is a 48 y o  female  has no past medical history on file  who presents today in office for   Referral to GI  Patient is accompanied today by her daughter who helps with translation from [place]  - Patient is a 48 y o  female who presents today for  Referral to GI  Patient notes she is due for colonoscopy and would like to be referred to gastroenterology  Patient notes she last had a colonoscopy completed about 4 years ago in [facility]  Patient notes she was told to have her next colonoscopy completed in 4 years  Patient notes she has also been experiencing some right lower quadrant abdominal pain  Patient notes that comes and goes and sometimes has associated nausea, but denies any vomiting, constipation, diarrhea, fevers, chills, urinary symptoms  Patient notes she does have history of H pylori        The following portions of the patient's history were reviewed and updated as appropriate: allergies, current medications, past family history, past medical history, past social history, past surgical history and problem list         Review of Systems   Constitutional: Negative for appetite change, fatigue and fever  HENT: Negative for congestion, ear pain, rhinorrhea and sore throat  Eyes: Negative for pain  Respiratory: Negative for cough, chest tightness and shortness of breath  Cardiovascular: Negative for chest pain and palpitations  Gastrointestinal: Positive for abdominal pain and nausea  Negative for constipation, diarrhea and vomiting  Genitourinary: Negative for difficulty urinating and dysuria  Musculoskeletal: Negative for myalgias  Skin: Negative for rash  Neurological: Negative for dizziness and headaches  Psychiatric/Behavioral: The patient is not nervous/anxious  Objective:   Vitals:    04/12/21 0945   BP: 118/66   BP Location: Left arm   Patient Position: Sitting   Cuff Size: Standard   Pulse: 74   Resp: 20   Temp: 98 3 °F (36 8 °C)   TempSrc: Temporal   SpO2: 96%   Weight: 73 kg (160 lb 14 4 oz)   Height: 5' 0 05" (1 525 m)         Physical Exam  Vitals signs and nursing note reviewed  Constitutional:       General: She is not in acute distress  Appearance: She is well-developed  HENT:      Head: Normocephalic and atraumatic  Right Ear: External ear normal       Left Ear: External ear normal       Nose: Nose normal    Eyes:      Conjunctiva/sclera: Conjunctivae normal    Neck:      Musculoskeletal: Normal range of motion and neck supple  Cardiovascular:      Rate and Rhythm: Normal rate and regular rhythm  Heart sounds: Normal heart sounds  No murmur  Pulmonary:      Effort: Pulmonary effort is normal       Breath sounds: Normal breath sounds  No wheezing  Abdominal:      General: Bowel sounds are normal       Palpations: Abdomen is soft  Tenderness: There is no abdominal tenderness  Skin:     General: Skin is warm and dry  Neurological:      Mental Status: She is alert and oriented to person, place, and time     Psychiatric:         Speech: Speech normal          Behavior: Behavior normal

## 2021-04-29 ENCOUNTER — IMMUNIZATIONS (OUTPATIENT)
Dept: FAMILY MEDICINE CLINIC | Facility: HOSPITAL | Age: 56
End: 2021-04-29

## 2021-04-29 DIAGNOSIS — Z23 ENCOUNTER FOR IMMUNIZATION: Primary | ICD-10-CM

## 2021-04-29 PROCEDURE — 91301 SARS-COV-2 / COVID-19 MRNA VACCINE (MODERNA) 100 MCG: CPT

## 2021-04-29 PROCEDURE — 0011A SARS-COV-2 / COVID-19 MRNA VACCINE (MODERNA) 100 MCG: CPT

## 2021-05-20 ENCOUNTER — APPOINTMENT (EMERGENCY)
Dept: CT IMAGING | Facility: HOSPITAL | Age: 56
DRG: 247 | End: 2021-05-20
Payer: COMMERCIAL

## 2021-05-20 ENCOUNTER — HOSPITAL ENCOUNTER (INPATIENT)
Facility: HOSPITAL | Age: 56
LOS: 3 days | Discharge: HOME | DRG: 247 | End: 2021-05-23
Attending: EMERGENCY MEDICINE | Admitting: SURGERY
Payer: COMMERCIAL

## 2021-05-20 DIAGNOSIS — K56.609 SMALL BOWEL OBSTRUCTION (HCC): Primary | ICD-10-CM

## 2021-05-20 LAB
ALBUMIN SERPL BCP-MCNC: 3.8 G/DL (ref 3.5–5)
ALP SERPL-CCNC: 95 U/L (ref 46–116)
ALT SERPL W P-5'-P-CCNC: 79 U/L (ref 12–78)
ANION GAP SERPL CALCULATED.3IONS-SCNC: 8 MMOL/L (ref 4–13)
AST SERPL W P-5'-P-CCNC: 42 U/L (ref 5–45)
ATRIAL RATE: 66 BPM
BASOPHILS # BLD AUTO: 0.03 THOUSANDS/ΜL (ref 0–0.1)
BASOPHILS NFR BLD AUTO: 0 % (ref 0–1)
BILIRUB DIRECT SERPL-MCNC: 0.12 MG/DL (ref 0–0.2)
BILIRUB SERPL-MCNC: 0.33 MG/DL (ref 0.2–1)
BILIRUB UR QL STRIP: NEGATIVE
BUN SERPL-MCNC: 14 MG/DL (ref 5–25)
CALCIUM SERPL-MCNC: 9 MG/DL (ref 8.3–10.1)
CHLORIDE SERPL-SCNC: 103 MMOL/L (ref 100–108)
CLARITY UR: CLEAR
CO2 SERPL-SCNC: 30 MMOL/L (ref 21–32)
COLOR UR: YELLOW
CREAT SERPL-MCNC: 0.74 MG/DL (ref 0.6–1.3)
EOSINOPHIL # BLD AUTO: 0.36 THOUSAND/ΜL (ref 0–0.61)
EOSINOPHIL NFR BLD AUTO: 5 % (ref 0–6)
ERYTHROCYTE [DISTWIDTH] IN BLOOD BY AUTOMATED COUNT: 14 % (ref 11.6–15.1)
GFR SERPL CREATININE-BSD FRML MDRD: 106 ML/MIN/1.73SQ M
GLUCOSE SERPL-MCNC: 114 MG/DL (ref 65–140)
GLUCOSE UR STRIP-MCNC: NEGATIVE MG/DL
HCG SERPL QL: NEGATIVE
HCT VFR BLD AUTO: 43.9 % (ref 34.8–46.1)
HGB BLD-MCNC: 14 G/DL (ref 11.5–15.4)
HGB UR QL STRIP.AUTO: NEGATIVE
IMM GRANULOCYTES # BLD AUTO: 0.03 THOUSAND/UL (ref 0–0.2)
IMM GRANULOCYTES NFR BLD AUTO: 0 % (ref 0–2)
KETONES UR STRIP-MCNC: NEGATIVE MG/DL
LACTATE SERPL-SCNC: 0.8 MMOL/L (ref 0.5–2)
LEUKOCYTE ESTERASE UR QL STRIP: NEGATIVE
LIPASE SERPL-CCNC: 216 U/L (ref 73–393)
LYMPHOCYTES # BLD AUTO: 1.45 THOUSANDS/ΜL (ref 0.6–4.47)
LYMPHOCYTES NFR BLD AUTO: 21 % (ref 14–44)
MCH RBC QN AUTO: 27 PG (ref 26.8–34.3)
MCHC RBC AUTO-ENTMCNC: 31.9 G/DL (ref 31.4–37.4)
MCV RBC AUTO: 85 FL (ref 82–98)
MONOCYTES # BLD AUTO: 0.72 THOUSAND/ΜL (ref 0.17–1.22)
MONOCYTES NFR BLD AUTO: 10 % (ref 4–12)
NEUTROPHILS # BLD AUTO: 4.4 THOUSANDS/ΜL (ref 1.85–7.62)
NEUTS SEG NFR BLD AUTO: 64 % (ref 43–75)
NITRITE UR QL STRIP: NEGATIVE
NRBC BLD AUTO-RTO: 0 /100 WBCS
P AXIS: 54 DEGREES
PH UR STRIP.AUTO: 8 [PH] (ref 4.5–8)
PLATELET # BLD AUTO: 279 THOUSANDS/UL (ref 149–390)
PMV BLD AUTO: 10.7 FL (ref 8.9–12.7)
POTASSIUM SERPL-SCNC: 4 MMOL/L (ref 3.5–5.3)
PR INTERVAL: 144 MS
PROT SERPL-MCNC: 8.1 G/DL (ref 6.4–8.2)
PROT UR STRIP-MCNC: NEGATIVE MG/DL
QRS AXIS: 43 DEGREES
QRSD INTERVAL: 80 MS
QT INTERVAL: 388 MS
QTC INTERVAL: 406 MS
RBC # BLD AUTO: 5.19 MILLION/UL (ref 3.81–5.12)
SODIUM SERPL-SCNC: 141 MMOL/L (ref 136–145)
SP GR UR STRIP.AUTO: 1.01 (ref 1–1.03)
T WAVE AXIS: -15 DEGREES
TROPONIN I SERPL-MCNC: <0.02 NG/ML
UROBILINOGEN UR QL STRIP.AUTO: 0.2 E.U./DL
VENTRICULAR RATE: 66 BPM
WBC # BLD AUTO: 6.99 THOUSAND/UL (ref 4.31–10.16)

## 2021-05-20 PROCEDURE — 96374 THER/PROPH/DIAG INJ IV PUSH: CPT

## 2021-05-20 PROCEDURE — 93010 ELECTROCARDIOGRAM REPORT: CPT | Performed by: INTERNAL MEDICINE

## 2021-05-20 PROCEDURE — 84484 ASSAY OF TROPONIN QUANT: CPT | Performed by: EMERGENCY MEDICINE

## 2021-05-20 PROCEDURE — 36415 COLL VENOUS BLD VENIPUNCTURE: CPT | Performed by: EMERGENCY MEDICINE

## 2021-05-20 PROCEDURE — 99285 EMERGENCY DEPT VISIT HI MDM: CPT | Performed by: EMERGENCY MEDICINE

## 2021-05-20 PROCEDURE — 83690 ASSAY OF LIPASE: CPT | Performed by: EMERGENCY MEDICINE

## 2021-05-20 PROCEDURE — 80048 BASIC METABOLIC PNL TOTAL CA: CPT | Performed by: EMERGENCY MEDICINE

## 2021-05-20 PROCEDURE — 74177 CT ABD & PELVIS W/CONTRAST: CPT

## 2021-05-20 PROCEDURE — 81003 URINALYSIS AUTO W/O SCOPE: CPT

## 2021-05-20 PROCEDURE — 83605 ASSAY OF LACTIC ACID: CPT | Performed by: SURGERY

## 2021-05-20 PROCEDURE — 96361 HYDRATE IV INFUSION ADD-ON: CPT

## 2021-05-20 PROCEDURE — 93005 ELECTROCARDIOGRAM TRACING: CPT

## 2021-05-20 PROCEDURE — 96375 TX/PRO/DX INJ NEW DRUG ADDON: CPT

## 2021-05-20 PROCEDURE — C9113 INJ PANTOPRAZOLE SODIUM, VIA: HCPCS | Performed by: EMERGENCY MEDICINE

## 2021-05-20 PROCEDURE — 84703 CHORIONIC GONADOTROPIN ASSAY: CPT | Performed by: EMERGENCY MEDICINE

## 2021-05-20 PROCEDURE — 99285 EMERGENCY DEPT VISIT HI MDM: CPT

## 2021-05-20 PROCEDURE — 80076 HEPATIC FUNCTION PANEL: CPT | Performed by: EMERGENCY MEDICINE

## 2021-05-20 PROCEDURE — 99223 1ST HOSP IP/OBS HIGH 75: CPT | Performed by: PHYSICIAN ASSISTANT

## 2021-05-20 PROCEDURE — 96376 TX/PRO/DX INJ SAME DRUG ADON: CPT

## 2021-05-20 PROCEDURE — 85025 COMPLETE CBC W/AUTO DIFF WBC: CPT | Performed by: EMERGENCY MEDICINE

## 2021-05-20 RX ORDER — PANTOPRAZOLE SODIUM 40 MG/1
40 INJECTION, POWDER, FOR SOLUTION INTRAVENOUS ONCE
Status: COMPLETED | OUTPATIENT
Start: 2021-05-20 | End: 2021-05-20

## 2021-05-20 RX ORDER — SODIUM CHLORIDE, SODIUM LACTATE, POTASSIUM CHLORIDE, CALCIUM CHLORIDE 600; 310; 30; 20 MG/100ML; MG/100ML; MG/100ML; MG/100ML
125 INJECTION, SOLUTION INTRAVENOUS CONTINUOUS
Status: DISCONTINUED | OUTPATIENT
Start: 2021-05-20 | End: 2021-05-22

## 2021-05-20 RX ORDER — ONDANSETRON 2 MG/ML
4 INJECTION INTRAMUSCULAR; INTRAVENOUS ONCE
Status: COMPLETED | OUTPATIENT
Start: 2021-05-20 | End: 2021-05-20

## 2021-05-20 RX ORDER — ONDANSETRON 2 MG/ML
4 INJECTION INTRAMUSCULAR; INTRAVENOUS EVERY 6 HOURS PRN
Status: DISCONTINUED | OUTPATIENT
Start: 2021-05-20 | End: 2021-05-23 | Stop reason: HOSPADM

## 2021-05-20 RX ORDER — ONDANSETRON 2 MG/ML
INJECTION INTRAMUSCULAR; INTRAVENOUS
Status: COMPLETED
Start: 2021-05-20 | End: 2021-05-20

## 2021-05-20 RX ORDER — SODIUM CHLORIDE, SODIUM LACTATE, POTASSIUM CHLORIDE, CALCIUM CHLORIDE 600; 310; 30; 20 MG/100ML; MG/100ML; MG/100ML; MG/100ML
1000 INJECTION, SOLUTION INTRAVENOUS ONCE
Status: COMPLETED | OUTPATIENT
Start: 2021-05-20 | End: 2021-05-20

## 2021-05-20 RX ADMIN — ONDANSETRON 4 MG: 2 INJECTION INTRAMUSCULAR; INTRAVENOUS at 12:04

## 2021-05-20 RX ADMIN — PANTOPRAZOLE SODIUM 40 MG: 40 INJECTION, POWDER, FOR SOLUTION INTRAVENOUS at 09:09

## 2021-05-20 RX ADMIN — MORPHINE SULFATE 1 MG: 2 INJECTION, SOLUTION INTRAMUSCULAR; INTRAVENOUS at 18:11

## 2021-05-20 RX ADMIN — ONDANSETRON 4 MG: 2 INJECTION INTRAMUSCULAR; INTRAVENOUS at 18:11

## 2021-05-20 RX ADMIN — ONDANSETRON 4 MG: 2 INJECTION INTRAMUSCULAR; INTRAVENOUS at 18:17

## 2021-05-20 RX ADMIN — SODIUM CHLORIDE, SODIUM LACTATE, POTASSIUM CHLORIDE, AND CALCIUM CHLORIDE 125 ML/HR: .6; .31; .03; .02 INJECTION, SOLUTION INTRAVENOUS at 11:00

## 2021-05-20 RX ADMIN — ONDANSETRON 4 MG: 2 INJECTION INTRAMUSCULAR; INTRAVENOUS at 09:12

## 2021-05-20 RX ADMIN — TOPICAL ANESTHETIC 2 SPRAY: 200 SPRAY DENTAL; PERIODONTAL at 11:48

## 2021-05-20 RX ADMIN — SODIUM CHLORIDE, SODIUM LACTATE, POTASSIUM CHLORIDE, AND CALCIUM CHLORIDE 1000 ML: .6; .31; .03; .02 INJECTION, SOLUTION INTRAVENOUS at 09:12

## 2021-05-20 RX ADMIN — IOHEXOL 100 ML: 350 INJECTION, SOLUTION INTRAVENOUS at 09:53

## 2021-05-20 NOTE — LETTER
[address]  Dept: [phone]    May 23, 2021     Patient: Coco Lane   YOB: 1965   Date of Visit: 5/20/2021       To Whom it May Concern:    Coco Lane is under my professional care  She was seen in the hospital from 5/20/2021   to 05/23/21  She may return to work on 5/31/2021 or sooner if feels able without limitations  If you have any questions or concerns, please don't hesitate to call           Sincerely,          Jad Quiros MD

## 2021-05-20 NOTE — ED PROVIDER NOTES
History  Chief Complaint   Patient presents with    Abdominal Pain     Patient reports mid abdominal pain since yesterday, also reports nausea and vomiting "red liquid "     49 yo F presenting for evaluation of 2 days of abdominal pain  Citizen of [place] speaking, son at bedside providing translation  Reports onset of pain yesterday after she drank hot water (water bottle that was sitting in hot car)  She c/o epigastric pain, constant, does not radiate  Tried Tylenol and Omeprazole with minimal relief  Denies history of similar  Reports 4 episodes of vomiting around 6 am (3 hours PTA), reports noticing bright red that she thought was blood  States this occurred with each episode of vomiting  No AC/AP agents  Denies dark or bloody stools  States last BM this morning  Per records, h/o H  Pylori  Seen at PCP on 21 and referred to GI, has not seen them yet  H/o  & b/l oopherectomy    MDM: 49 yo F with epigastric pain and possible hematemesis- will treat sx abdominal labs, EKG/trop, CT A/P to assess for intra-abdominal pathology, reassess, dispo accordingly             Prior to Admission Medications   Prescriptions Last Dose Informant Patient Reported? Taking? Ascorbic Acid (vitamin C) 1000 MG tablet   No Yes   Sig: Take 1 tablet (1,000 mg total) by mouth every 12 (twelve) hours   Multiple Vitamin (multivitamin) tablet   No Yes   Sig: Take 1 tablet by mouth daily   cholecalciferol (VITAMIN D3) 1,000 units tablet   No Yes   Sig: Take 2 tablets (2,000 Units total) by mouth daily      Facility-Administered Medications: None       History reviewed  No pertinent past medical history  Past Surgical History:   Procedure Laterality Date    BILATERAL OOPHORECTOMY Bilateral      SECTION      x2       History reviewed  No pertinent family history  I have reviewed and agree with the history as documented      E-Cigarette/Vaping    E-Cigarette Use Never User      E-Cigarette/Vaping Substances     Social History     Tobacco Use    Smoking status: Never Smoker    Smokeless tobacco: Never Used   Substance Use Topics    Alcohol use: Not Currently    Drug use: Never       Review of Systems   Constitutional: Negative for chills, fever and unexpected weight change  HENT: Negative for ear pain, rhinorrhea and sore throat  Eyes: Negative for pain and visual disturbance  Respiratory: Negative for cough and shortness of breath  Cardiovascular: Negative for chest pain and leg swelling  Gastrointestinal: Positive for abdominal pain, nausea and vomiting  Negative for constipation and diarrhea  Endocrine: Negative for polydipsia, polyphagia and polyuria  Genitourinary: Negative for dysuria, frequency, hematuria and urgency  Musculoskeletal: Negative for back pain, myalgias and neck pain  Skin: Negative for color change and rash  Allergic/Immunologic: Negative for environmental allergies and immunocompromised state  Neurological: Negative for dizziness, weakness, light-headedness, numbness and headaches  Hematological: Negative for adenopathy  Does not bruise/bleed easily  Psychiatric/Behavioral: Negative for agitation and confusion  All other systems reviewed and are negative  Physical Exam  Physical Exam  Vitals signs and nursing note reviewed  Constitutional:       Appearance: Normal appearance  She is well-developed  HENT:      Head: Normocephalic and atraumatic  Nose: Nose normal    Eyes:      Conjunctiva/sclera: Conjunctivae normal    Neck:      Musculoskeletal: Normal range of motion and neck supple  Cardiovascular:      Rate and Rhythm: Normal rate and regular rhythm  Heart sounds: Normal heart sounds  Pulmonary:      Effort: Pulmonary effort is normal  No respiratory distress  Breath sounds: Normal breath sounds  No stridor  No wheezing or rales  Chest:      Chest wall: No tenderness  Abdominal:      General: There is no distension        Palpations: Abdomen is soft  Tenderness: There is abdominal tenderness in the epigastric area  There is no guarding or rebound  Musculoskeletal:         General: No swelling or deformity  Skin:     General: Skin is warm and dry  Findings: No rash  Neurological:      General: No focal deficit present  Mental Status: She is alert and oriented to person, place, and time  Motor: No abnormal muscle tone  Coordination: Coordination normal    Psychiatric:         Thought Content:  Thought content normal          Judgment: Judgment normal          Vital Signs  ED Triage Vitals [05/20/21 0845]   Temperature Pulse Respirations Blood Pressure SpO2   98 1 °F (36 7 °C) 80 18 126/66 98 %      Temp Source Heart Rate Source Patient Position - Orthostatic VS BP Location FiO2 (%)   Oral Monitor Sitting Right arm --      Pain Score       8           Vitals:    05/20/21 0845 05/20/21 1035 05/20/21 1253 05/20/21 1610   BP: 126/66 119/66 135/78 155/77   Pulse: 80 71 82 76   Patient Position - Orthostatic VS: Sitting Lying Lying Lying         Visual Acuity      ED Medications  Medications   lactated ringers infusion (125 mL/hr Intravenous New Bag 5/20/21 1100)   ondansetron (ZOFRAN) injection 4 mg (has no administration in time range)   morphine injection 1 mg (has no administration in time range)   pantoprazole (PROTONIX) injection 40 mg (40 mg Intravenous Given 5/20/21 0909)   ondansetron (ZOFRAN) injection 4 mg (4 mg Intravenous Given 5/20/21 0912)   lactated ringers infusion 1,000 mL (0 mL Intravenous Stopped 5/20/21 1100)   iohexol (OMNIPAQUE) 350 MG/ML injection (SINGLE-DOSE) 100 mL (100 mL Intravenous Given 5/20/21 0953)   benzocaine (HURRICAINE) 20 % mucosal spray 2 spray (2 sprays Mucosal Given 5/20/21 1148)   ondansetron (ZOFRAN) injection 4 mg (4 mg Intravenous Given 5/20/21 1204)       Diagnostic Studies  Results Reviewed     Procedure Component Value Units Date/Time    Lactic acid, plasma [[id number]]  (Normal) Collected: 05/20/21 1118    Lab Status: Final result Specimen: Blood from Arm, Right Updated: 05/20/21 1141     LACTIC ACID 0 8 mmol/L     Narrative:      Result may be elevated if tourniquet was used during collection      Urine Macroscopic, POC [[id number]] Collected: 05/20/21 1017    Lab Status: Final result Specimen: Urine Updated: 05/20/21 1018     Color, UA Yellow     Clarity, UA Clear     pH, UA 8 0     Leukocytes, UA Negative     Nitrite, UA Negative     Protein, UA Negative mg/dl      Glucose, UA Negative mg/dl      Ketones, UA Negative mg/dl      Urobilinogen, UA 0 2 E U /dl      Bilirubin, UA Negative     Blood, UA Negative     Specific Gravity, UA 1 010    Narrative:      CLINITEK RESULT    Lipase [[id number]]  (Normal) Collected: 05/20/21 0909    Lab Status: Final result Specimen: Blood from Arm, Right Updated: 05/20/21 0941     Lipase 216 u/L     Pregnancy Test (HCG Qualitative) [[id number]]  (Normal) Collected: 05/20/21 0909    Lab Status: Final result Specimen: Blood from Arm, Right Updated: 05/20/21 0941     Preg, Serum Negative    Hepatic function panel [[id number]]  (Abnormal) Collected: 05/20/21 0909    Lab Status: Final result Specimen: Blood from Arm, Right Updated: 05/20/21 0941     Total Bilirubin 0 33 mg/dL      Bilirubin, Direct 0 12 mg/dL      Alkaline Phosphatase 95 U/L      AST 42 U/L      ALT 79 U/L      Total Protein 8 1 g/dL      Albumin 3 8 g/dL     Troponin I [[id number]]  (Normal) Collected: 05/20/21 0909    Lab Status: Final result Specimen: Blood from Arm, Right Updated: 05/20/21 0935     Troponin I <0 02 ng/mL     Basic metabolic panel [[id number]] Collected: 05/20/21 0909    Lab Status: Final result Specimen: Blood from Arm, Right Updated: 05/20/21 0933     Sodium 141 mmol/L      Potassium 4 0 mmol/L      Chloride 103 mmol/L      CO2 30 mmol/L      ANION GAP 8 mmol/L      BUN 14 mg/dL      Creatinine 0 74 mg/dL      Glucose 114 mg/dL      Calcium 9 0 mg/dL      eGFR 106 ml/min/1 73sq m     Narrative:      National Kidney Disease Foundation guidelines for Chronic Kidney Disease (CKD):     Stage 1 with normal or high GFR (GFR > 90 mL/min/1 73 square meters)    Stage 2 Mild CKD (GFR = 60-89 mL/min/1 73 square meters)    Stage 3A Moderate CKD (GFR = 45-59 mL/min/1 73 square meters)    Stage 3B Moderate CKD (GFR = 30-44 mL/min/1 73 square meters)    Stage 4 Severe CKD (GFR = 15-29 mL/min/1 73 square meters)    Stage 5 End Stage CKD (GFR <15 mL/min/1 73 square meters)  Note: GFR calculation is accurate only with a steady state creatinine    CBC and differential [[id number]]  (Abnormal) Collected: 05/20/21 0909    Lab Status: Final result Specimen: Blood from Arm, Right Updated: 05/20/21 0919     WBC 6 99 Thousand/uL      RBC 5 19 Million/uL      Hemoglobin 14 0 g/dL      Hematocrit 43 9 %      MCV 85 fL      MCH 27 0 pg      MCHC 31 9 g/dL      RDW 14 0 %      MPV 10 7 fL      Platelets 473 Thousands/uL      nRBC 0 /100 WBCs      Neutrophils Relative 64 %      Immat GRANS % 0 %      Lymphocytes Relative 21 %      Monocytes Relative 10 %      Eosinophils Relative 5 %      Basophils Relative 0 %      Neutrophils Absolute 4 40 Thousands/µL      Immature Grans Absolute 0 03 Thousand/uL      Lymphocytes Absolute 1 45 Thousands/µL      Monocytes Absolute 0 72 Thousand/µL      Eosinophils Absolute 0 36 Thousand/µL      Basophils Absolute 0 03 Thousands/µL                  CT abdomen pelvis with contrast   ED Interpretation by Polina Albright DO (05/20 1018)   FINDINGS:     ABDOMEN     LOWER CHEST:  No clinically significant abnormality identified in the visualized lower chest      LIVER/BILIARY TREE:  Unremarkable      GALLBLADDER:  No calcified gallstones  No pericholecystic inflammatory change      SPLEEN:  Unremarkable      PANCREAS:  Unremarkable      ADRENAL GLANDS:  Unremarkable      KIDNEYS/URETERS:  Unremarkable   No hydronephrosis      STOMACH AND BOWEL:  There is disproportionately dilated proximal small bowel in the left abdomen with relatively decompressed mid and distal small bowel loops  The point of transition is in the mid lower abdomen (series 2 image 53) with fecalization   within the proximal loop  There is fluid distended stomach  No pneumatosis      APPENDIX:  A normal appendix was visualized      ABDOMINOPELVIC CAVITY:  No ascites  No pneumoperitoneum  No lymphadenopathy      VESSELS:  Unremarkable for patient's age      PELVIS     REPRODUCTIVE ORGANS:  Unremarkable for patient's age      URINARY BLADDER:  Fariba Millet      ABDOMINAL WALL/INGUINAL REGIONS:  Unremarkable      OSSEOUS STRUCTURES:  No acute fracture or destructive osseous lesion      IMPRESSION:     Evidence of high-grade small bowel obstruction with the point of transition in the mid lower abdomen  No pneumatosis  Surgical consultation is recommended  Final Result by Yareli Cook MD (05/20 1015)      Evidence of high-grade small bowel obstruction with the point of transition in the mid lower abdomen  No pneumatosis  Surgical consultation is recommended                     I personally discussed this study with Cynthia Evans on 5/20/2021 at 10:12 AM                Workstation performed: [id number]                    Procedures  Procedures         ED Course  ED Course as of May 20 1623   Thu May 20, 2021   3071 EKG: NSR @ 66 bpm, normal axis, normal intervals, inferior depressions, t wave inversion III      0927 Hemoglobin: 14 0   0935 Sx constant for 24 hours, no need for delta trop   Troponin I: <0 02   1019 Messaged surgery      1052 Reviewed results, pt reports feeling better, pending call back from surgery      1202 NGT placed                HEART Risk Score      Most Recent Value   Heart Score Risk Calculator   History  0 Filed at: 05/20/2021 0924   ECG  2 Filed at: 05/20/2021 2617   Age  1 Filed at: 05/20/2021 0924   Risk Factors  0 Filed at: 05/20/2021 0924   Troponin  0 Filed at: 05/20/2021 8146   HEART Score  3 Filed at: 05/20/2021 3212                      SBIRT 20yo+      Most Recent Value   SBIRT (25 yo +)   In order to provide better care to our patients, we are screening all of our patients for alcohol and drug use  Would it be okay to ask you these screening questions? Unable to answer at this time Filed at: 05/20/2021 1012                    MDM  Number of Diagnoses or Management Options  Small bowel obstruction [facility]):   Diagnosis management comments: 47 yo F with upper abdominal pain/N/V, workup revealed SBO  Admitted to surgery service for further management       Amount and/or Complexity of Data Reviewed  Clinical lab tests: ordered and reviewed  Tests in the radiology section of CPT®: ordered and reviewed  Tests in the medicine section of CPT®: ordered and reviewed  Review and summarize past medical records: yes  Independent visualization of images, tracings, or specimens: yes        Disposition  Final diagnoses:   Small bowel obstruction ([address] )     Time reflects when diagnosis was documented in both MDM as applicable and the Disposition within this note     Time User Action Codes Description Comment    5/20/2021 11:04 AM Mart GUTIERRES Add [K56 609] Small bowel obstruction [facility])       ED Disposition     ED Disposition Condition Date/Time Comment    Admit Stable Thu May 20, 2021 11:04 AM Case was discussed with surgery and the patient's admission status was agreed to be Admission Status: inpatient status to the service of Dr Guille Rehman    None         Patient's Medications   Discharge Prescriptions    No medications on file     No discharge procedures on file      PDMP Review     None          ED Provider  Electronically Signed by           Leonor Monday, DO 05/20/21 1077

## 2021-05-20 NOTE — PLAN OF CARE
Problem: PAIN - ADULT  Goal: Verbalizes/displays adequate comfort level or baseline comfort level  Description: Interventions:  - Encourage patient to monitor pain and request assistance  - Assess pain using appropriate pain scale  - Administer analgesics based on type and severity of pain and evaluate response  - Implement non-pharmacological measures as appropriate and evaluate response  - Consider cultural and social influences on pain and pain management  - Notify physician/advanced practitioner if interventions unsuccessful or patient reports new pain  Outcome: Progressing     Problem: INFECTION - ADULT  Goal: Absence or prevention of progression during hospitalization  Description: INTERVENTIONS:  - Assess and monitor for signs and symptoms of infection  - Monitor lab/diagnostic results  - Monitor all insertion sites, i e  indwelling lines, tubes, and drains  - Monitor endotracheal if appropriate and nasal secretions for changes in amount and color  - [place] appropriate cooling/warming therapies per order  - Administer medications as ordered  - Instruct and encourage patient and family to use good hand hygiene technique  - Identify and instruct in appropriate isolation precautions for identified infection/condition  Outcome: Progressing     Problem: SAFETY ADULT  Goal: Patient will remain free of falls  Description: INTERVENTIONS:  - Assess patient frequently for physical needs  -  Identify cognitive and physical deficits and behaviors that affect risk of falls    -  [place] fall precautions as indicated by assessment   - Educate patient/family on patient safety including physical limitations  - Instruct patient to call for assistance with activity based on assessment  - Modify environment to reduce risk of injury  - Consider OT/PT consult to assist with strengthening/mobility  Outcome: Progressing

## 2021-05-20 NOTE — H&P
H&P Exam - General Surgery   [facility] [place] [facility] 48 y o  female MRN: [id number]  Unit/Bed#: ED 14 Encounter: [id number]    Assessment/Plan     Assessment:  High-grade small bowel obstruction    Plan:  Admit, NPO, N/G, IV hydration, pain Rx and anti-emetics  Serial abdominal exams    History of Present Illness     HPI:  [facility] [place] [facility] is a 48 y o  female who presents with a 24 hour history of abdominal pain associated with N/V  She claims that she saw blood in at least one of the emesis episodes  She states that she had an episode of right abdominal pain one or two months ago  The patient claims that she had normal BM's yesterday and this morning  She has a history of  and apparently hysterectomy  Reportedly she had a colonoscopy 4 years ago and is in the process of getting scheduled for colonoscopy this year  Her WBC was normal at 6 99, her lytes were WNL, and the lactic acid was 0 8  A CT scan with IV contrast shows fluid filled small bowel and stomach with a transition point in the mid abdomen  There is some fecalization of the small bowel contents  The patient was attended at bedside by Dr Savanah Willams who placed an N/G tube during her exam      Review of Systems   Constitutional: Negative  HENT:        Current complaints of URI   Eyes:        Positive for eyeglasses   Respiratory: Negative  Cardiovascular: Negative  Gastrointestinal:        Please refer to the HPI    Endocrine: Negative  Genitourinary:        Patient is s/p  X 2 and STACEY   Musculoskeletal:        Patient has known herniated lumbar disc  Skin: Negative  Allergic/Immunologic: Negative  Neurological: Negative  Hematological: Negative  Psychiatric/Behavioral: Negative  Historical Information   History reviewed  No pertinent past medical history    Past Surgical History:   Procedure Laterality Date    BILATERAL OOPHORECTOMY Bilateral      SECTION      x2     Social History Social History     Substance and Sexual Activity   Alcohol Use Not Currently     Social History     Substance and Sexual Activity   Drug Use Never     Social History     Tobacco Use   Smoking Status Never Smoker   Smokeless Tobacco Never Used     E-Cigarette/Vaping    E-Cigarette Use Never User      E-Cigarette/Vaping Substances     Family History: non-contributory    Meds/Allergies   all medications and allergies reviewed  No Known Allergies    Objective   First Vitals:   Blood Pressure: 126/66 (05/20/21 0845)  Pulse: 80 (05/20/21 0845)  Temperature: 98 1 °F (36 7 °C) (05/20/21 0845)  Temp Source: Oral (05/20/21 0845)  Respirations: 18 (05/20/21 0845)  Weight - Scale: 72 2 kg (159 lb 2 8 oz) (05/20/21 0845)  SpO2: 98 % (05/20/21 0845)    Current Vitals:   Blood Pressure: 119/66 (05/20/21 1035)  Pulse: 71 (05/20/21 1035)  Temperature: 98 1 °F (36 7 °C) (05/20/21 0845)  Temp Source: Oral (05/20/21 0845)  Respirations: 17 (05/20/21 1035)  Weight - Scale: 72 2 kg (159 lb 2 8 oz) (05/20/21 0845)  SpO2: 100 % (05/20/21 1035)      Intake/Output Summary (Last 24 hours) at 5/20/2021 1210  Last data filed at 5/20/2021 1205  Gross per 24 hour   Intake 1000 ml   Output 200 ml   Net 800 ml       Invasive Devices     Peripheral Intravenous Line            Peripheral IV 05/20/21 Right Antecubital less than 1 day          Drain            NG/OG/Enteral Tube 18 Fr Right nares less than 1 day                Physical Exam  Constitutional:       Comments: Obese, Non-English speaking  female who is currently in NAD  HENT:      Head: Normocephalic and atraumatic  Right Ear: Hearing normal       Left Ear: Hearing normal    Eyes:      General: Lids are normal  No scleral icterus  Cardiovascular:      Rate and Rhythm: Normal rate and regular rhythm  Heart sounds: S1 normal and S2 normal  No murmur     Pulmonary:      Comments: Auscultation reveals a good inspiratory effort and clear lung fields throughout  Abdominal:      Comments: There is a widened mid-line surgical scar from the umbilicus to the pubis s/p  and hysterectomy  No hernia is noted in this scar  Auscultation reveals a quiet abdomen  Palpation reveals a supple, non-tender abdomen without guarding  There is no tympany to percussion  CT scan with IV contrast shows fluid filled proximal small bowel and stomach with a transition point in the mid abdomen  There is some fecalization of the small bowel contents  Musculoskeletal:      Comments: There are no gross deformities and the patient moves all 4 extremities symmetrically  There is no calf tenderness   Lymphadenopathy:      Comments: There is no edema of the lower legs or ankles   Skin:     General: Skin is warm and dry  Neurological:      Mental Status: She is alert and oriented to person, place, and time  Cranial Nerves: Cranial nerves are intact  Psychiatric:         Attention and Perception: Attention normal          Mood and Affect: Mood and affect normal          Speech: Speech normal          Behavior: Behavior normal  Behavior is cooperative  Thought Content: Thought content normal          Cognition and Memory: Cognition normal          Judgment: Judgment normal          Lab Results:   I have personally reviewed pertinent lab results    , CBC:   Lab Results   Component Value Date    WBC 6 99 2021    HGB 14 0 2021    HCT 43 9 2021    MCV 85 2021     2021    MCH 27 0 2021    MCHC 31 9 2021    RDW 14 0 2021    MPV 10 7 2021    NRBC 0 2021   , CMP:   Lab Results   Component Value Date    SODIUM 141 2021    K 4 0 2021     2021    CO2 30 2021    BUN 14 2021    CREATININE 0 74 2021    CALCIUM 9 0 2021    AST 42 2021    ALT 79 (H) 2021    ALKPHOS 95 2021    EGFR 106 2021     Imaging: I have personally reviewed pertinent reports  and I have personally reviewed pertinent films in PACS  EKG, Pathology, and Other Studies: I have personally reviewed pertinent reports  Code Status: No Order  Advance Directive and Living Will:      Power of :    POLST:      Counseling / Coordination of Care  Total floor / unit time spent today 30 minutes  Greater than 50% of total time was spent with the patient and / or family counseling and / or coordination of care  A description of the counseling / coordination of care

## 2021-05-21 ENCOUNTER — APPOINTMENT (INPATIENT)
Dept: CT IMAGING | Facility: HOSPITAL | Age: 56
DRG: 247 | End: 2021-05-21
Payer: COMMERCIAL

## 2021-05-21 PROBLEM — K56.600 PARTIAL SMALL BOWEL OBSTRUCTION (HCC): Status: ACTIVE | Noted: 2021-05-21

## 2021-05-21 LAB
ANION GAP SERPL CALCULATED.3IONS-SCNC: 7 MMOL/L (ref 4–13)
BASOPHILS # BLD AUTO: 0.03 THOUSANDS/ΜL (ref 0–0.1)
BASOPHILS NFR BLD AUTO: 0 % (ref 0–1)
BUN SERPL-MCNC: 11 MG/DL (ref 5–25)
CALCIUM SERPL-MCNC: 8.4 MG/DL (ref 8.3–10.1)
CHLORIDE SERPL-SCNC: 104 MMOL/L (ref 100–108)
CO2 SERPL-SCNC: 30 MMOL/L (ref 21–32)
CREAT SERPL-MCNC: 0.8 MG/DL (ref 0.6–1.3)
EOSINOPHIL # BLD AUTO: 0.16 THOUSAND/ΜL (ref 0–0.61)
EOSINOPHIL NFR BLD AUTO: 2 % (ref 0–6)
ERYTHROCYTE [DISTWIDTH] IN BLOOD BY AUTOMATED COUNT: 14.1 % (ref 11.6–15.1)
GFR SERPL CREATININE-BSD FRML MDRD: 97 ML/MIN/1.73SQ M
GLUCOSE SERPL-MCNC: 117 MG/DL (ref 65–140)
HCT VFR BLD AUTO: 40.3 % (ref 34.8–46.1)
HGB BLD-MCNC: 12.7 G/DL (ref 11.5–15.4)
IMM GRANULOCYTES # BLD AUTO: 0.01 THOUSAND/UL (ref 0–0.2)
IMM GRANULOCYTES NFR BLD AUTO: 0 % (ref 0–2)
LYMPHOCYTES # BLD AUTO: 1.82 THOUSANDS/ΜL (ref 0.6–4.47)
LYMPHOCYTES NFR BLD AUTO: 27 % (ref 14–44)
MAGNESIUM SERPL-MCNC: 2.1 MG/DL (ref 1.6–2.6)
MCH RBC QN AUTO: 26.5 PG (ref 26.8–34.3)
MCHC RBC AUTO-ENTMCNC: 31.5 G/DL (ref 31.4–37.4)
MCV RBC AUTO: 84 FL (ref 82–98)
MONOCYTES # BLD AUTO: 0.65 THOUSAND/ΜL (ref 0.17–1.22)
MONOCYTES NFR BLD AUTO: 10 % (ref 4–12)
NEUTROPHILS # BLD AUTO: 4.09 THOUSANDS/ΜL (ref 1.85–7.62)
NEUTS SEG NFR BLD AUTO: 61 % (ref 43–75)
NRBC BLD AUTO-RTO: 0 /100 WBCS
PHOSPHATE SERPL-MCNC: 4.5 MG/DL (ref 2.7–4.5)
PLATELET # BLD AUTO: 258 THOUSANDS/UL (ref 149–390)
PMV BLD AUTO: 10.3 FL (ref 8.9–12.7)
POTASSIUM SERPL-SCNC: 4.1 MMOL/L (ref 3.5–5.3)
RBC # BLD AUTO: 4.79 MILLION/UL (ref 3.81–5.12)
SODIUM SERPL-SCNC: 141 MMOL/L (ref 136–145)
WBC # BLD AUTO: 6.76 THOUSAND/UL (ref 4.31–10.16)

## 2021-05-21 PROCEDURE — 80048 BASIC METABOLIC PNL TOTAL CA: CPT | Performed by: SURGERY

## 2021-05-21 PROCEDURE — 84100 ASSAY OF PHOSPHORUS: CPT | Performed by: SURGERY

## 2021-05-21 PROCEDURE — 99232 SBSQ HOSP IP/OBS MODERATE 35: CPT | Performed by: SURGERY

## 2021-05-21 PROCEDURE — 83735 ASSAY OF MAGNESIUM: CPT | Performed by: SURGERY

## 2021-05-21 PROCEDURE — 74176 CT ABD & PELVIS W/O CONTRAST: CPT

## 2021-05-21 PROCEDURE — G1004 CDSM NDSC: HCPCS

## 2021-05-21 PROCEDURE — 85025 COMPLETE CBC W/AUTO DIFF WBC: CPT | Performed by: SURGERY

## 2021-05-21 RX ORDER — DIPHENHYDRAMINE HYDROCHLORIDE 50 MG/ML
25 INJECTION INTRAMUSCULAR; INTRAVENOUS ONCE
Status: COMPLETED | OUTPATIENT
Start: 2021-05-21 | End: 2021-05-22

## 2021-05-21 RX ORDER — HYDROMORPHONE HCL IN WATER/PF 6 MG/30 ML
0.2 PATIENT CONTROLLED ANALGESIA SYRINGE INTRAVENOUS
Status: DISCONTINUED | OUTPATIENT
Start: 2021-05-21 | End: 2021-05-23 | Stop reason: HOSPADM

## 2021-05-21 RX ADMIN — HYDROMORPHONE HYDROCHLORIDE 0.2 MG: 0.2 INJECTION, SOLUTION INTRAMUSCULAR; INTRAVENOUS; SUBCUTANEOUS at 13:45

## 2021-05-21 RX ADMIN — SODIUM CHLORIDE, SODIUM LACTATE, POTASSIUM CHLORIDE, AND CALCIUM CHLORIDE 125 ML/HR: .6; .31; .03; .02 INJECTION, SOLUTION INTRAVENOUS at 11:02

## 2021-05-21 RX ADMIN — HYDROMORPHONE HYDROCHLORIDE 0.2 MG: 0.2 INJECTION, SOLUTION INTRAMUSCULAR; INTRAVENOUS; SUBCUTANEOUS at 07:38

## 2021-05-21 NOTE — QUICK NOTE
Family updated at bedside at patient's request  Explained plan to continue with conservative management and informed of plans to obtain CT with contrast this evening  They asked about plans for surgery and I reiterated that the plan is to continue with current management in hopes of avoiding surgery (which is likely the case since the patient seems to be improving), but I offered to update them if anything changes

## 2021-05-21 NOTE — PROGRESS NOTES
Progress Note - General Surgery     MANDIE  [facility] 48 y o  female MRN: [id number]  Unit/Bed#: E2 -01 Encounter: [id number]    Assessment/Plan:  [facility] [place] [facility] is doing improving partial small bowel obstruction  The      1  There is a transition point and a CT scan  Have ever her abdomen is less distended today and benign  Passing flatus  Continue bowel rest, IVF  May consider repeat CT with oral contrast in next day or so, to see for progression  Continue current management  Dr Gabriella Oropeza on for the weekend  Awaiting return of bowel function      2  VTE Prophylaxis  - Pharmacologic: Sequential compression device (Venodyne)   - Mechanical: sequential compression device    Subjective:  - Pain: denies  - Current diet: NPO  - no nausea and no vomiting  - + Flatus and no BM  - Not using incentive spirometer and Incentive spirometer within reach  - Laying in bed   -     Objective:     Vitals:   Vitals:    05/20/21 1253 05/20/21 1610 05/20/21 1812 05/20/21 2257   BP: 135/78 155/77 146/87 132/79   BP Location: Right arm Right arm Left arm Left arm   Pulse: 82 76 72 90   Resp: 18 18 18 18   Temp:   98 °F (36 7 °C) 98 3 °F (36 8 °C)   TempSrc:   Temporal Temporal   SpO2: 100% 100% 100% 97%   Weight: 72 2 kg (159 lb 2 8 oz)      Height: 5' (1 524 m)          I/O:   I/O       05/19 0701 - 05/20 0700 05/20 0701 - 05/21 0700 05/21 0701 - 05/22 0700    I V  (mL/kg)  1000 (13 9)     Total Intake(mL/kg)  1000 (13 9)     Emesis/NG output  400     Total Output  400     Net  +600                  Labs:  Results from last 7 days   Lab Units 05/21/21  0517 05/20/21  0909   WBC Thousand/uL 6 76 6 99   HEMOGLOBIN g/dL 12 7 14 0   HEMATOCRIT % 40 3 43 9   PLATELETS Thousands/uL 258 279   NEUTROS PCT % 61 64   MONOS PCT % 10 10       Results from last 7 days   Lab Units 05/21/21  0517 05/20/21  0909   SODIUM mmol/L 141 141   POTASSIUM mmol/L 4 1 4 0   CHLORIDE mmol/L 104 103   CO2 mmol/L 30 30   BUN mg/dL 11 14   CREATININE mg/dL 0 80 0 74   CALCIUM mg/dL 8 4 9 0   ALK PHOS U/L  --  95   ALT U/L  --  79*   AST U/L  --  42                     Imaging: I personally reviewed the radiology studies, my impression is as follows: As written  Exam:  General: alert, appears stated age and no distress  HEENT: Head: Normocephalic, no lesions, without obvious abnormality  Eyes: PERRL, EOM's intact  Fundi benign  , conjunctivae/corneas clear  Neck: normal, supple, no adenopathy and trachea midline  Skin: no rashes, no jaundice  Chest: Normal chest wall and respirations  Clear to auscultation  Heart[de-identified] regular rate and rhythm, S1, S2 normal, no murmur, click, rub or gallop  Abdomen: abdomen is soft without significant tenderness, masses, organomegaly or guarding  Her distention is much less today  Extremities: no redness or tenderness in the calves or thighs, no edema      Some portions of this records may have been generated with voice recognition software  There may be translation, syntax,  or grammatical errors  Occasional wrong word or "sound-a-like" substitutions may have occurred due to the inherent limitations of the voice recognition software  Read the chart carefully and recognize, using context, where substations may have occurred  If you have any questions, please contact the dictating provider for clarification or correction, as needed          Anjel Dang MD  5/21/2021

## 2021-05-21 NOTE — UTILIZATION REVIEW
Inpatient Admission Authorization Request   NOTIFICATION OF INPATIENT ADMISSION/INPATIENT AUTHORIZATION REQUEST   SERVICING FACILITY:   [address] [place] [address]  [address] [place] [address], [address]   Tax ID: [id number]  NPI: [id number]  Place of Service: Inpatient [facility]  [address]  Place of Service Code: 24     ATTENDING PROVIDER:  Attending Name and NPI#: Galen Lees, [address] [[id number]]  Address: [address] [place] [address], [address] [facility]  Phone: [phone]     UTILIZATION REVIEW CONTACT:  Paul Bautista Utilization   Network Utilization Review Department  Phone: [phone]  Fax: [phone]  Email: [email or web address]     PHYSICIAN ADVISORY SERVICES:  FOR [id number] REVIEW - MEDICAL NECESSITY DENIAL  Phone: [phone]  Fax: [phone]  Email: [email or web address]     TYPE OF REQUEST:  Inpatient Status     ADMISSION INFORMATION:  ADMISSION DATE/TIME: 5/20/21 1204  PATIENT DIAGNOSIS CODE/DESCRIPTION:  Small bowel obstruction ([address] ) [K56 609]  Abdominal pain [R10 9]  DISCHARGE DATE/TIME: No discharge date for patient encounter  DISCHARGE DISPOSITION (IF DISCHARGED): Home/Self Care     IMPORTANT INFORMATION:  Please contact the Kwesi Nugent directly with any questions or concerns regarding this request  Department voicemails are confidential     Send requests for admission clinical reviews, concurrent reviews, approvals, and administrative denials due to lack of clinical to fax [phone]

## 2021-05-21 NOTE — UTILIZATION REVIEW
Initial Clinical Review    Admission: Date/Time/Statement:   Admission Orders (From admission, onward)     Ordered        05/20/21 1204  Inpatient Admission  Once                   Orders Placed This Encounter   Procedures    Inpatient Admission     Standing Status:   Standing     Number of Occurrences:   1     Order Specific Question:   Level of Care     Answer:   Med Surg [16]     Order Specific Question:   Estimated length of stay     Answer:   More than 2 Midnights     Order Specific Question:   Certification     Answer:   I certify that inpatient services are medically necessary for this patient for a duration of greater than two midnights  See H&P and MD Progress Notes for additional information about the patient's course of treatment  ED Arrival Information     Expected Arrival Acuity Means of Arrival Escorted By Service Admission Type    - 5/20/2021 08:31 Urgent Walk-In Family Member Surgery-General Urgent    Arrival Complaint    abd pain        Chief Complaint   Patient presents with    Abdominal Pain     Patient reports mid abdominal pain since yesterday, also reports nausea and vomiting "red liquid "       Initial Presentation:   48  Y O female presents to ED from home with abdominal pain, nausea and vomiting for past  24 hrs  States she  Noticed  Blood  In at least  1 episode of emesis  Had normal  BM's for past 2 days  Labs  Reveal  Normal  WBC and lactic acid  CT scan showed SBO  NGT inserted  Admit  IP with High grade  Small bowel obstruction and plan is  NPO, NGT, pain control, serial abdominal exams,  antiemetics and IVF  Date:     5/21         Day 2:   Abdomen less distended  + flatus   - BM   Waiting  Return of bowel function  Remains NPO/bowel rest   No further nausea/vomiting  Continue  NGT and IVF         ED Triage Vitals [05/20/21 0845]   Temperature Pulse Respirations Blood Pressure SpO2   98 1 °F (36 7 °C) 80 18 126/66 98 %      Temp Source Heart Rate Source Patient Position - Orthostatic VS BP Location FiO2 (%)   Oral Monitor Sitting Right arm --      Pain Score       8          Wt Readings from Last 1 Encounters:   05/20/21 72 2 kg (159 lb 2 8 oz)     Additional Vital Signs:   97 8 °F (36 6 °C)  73  18  130/84  96 %  None (Room air)  Lying     05/20/21 2257  98 3 °F (36 8 °C)  90  18  132/79  97 %  None (Room air)  Lying   05/20/21 1812  98 °F (36 7 °C)  72  18  146/87  100 %  None (Room air)  Lying   05/20/21 1610  --  76  18  155/77  100 %  None (Room air)  Lying   05/20/21 1253  --  82  18  135/78  100 %  None (Room air)  Lying   05/20/21 1035  --  71  17  119/66  100 %  None (Room air)  Lying   05/20/21 0845  98 1 °F (36 7 °C)  80  18  126/66  98 %  None (Room air)  Sitting         Pertinent Labs/Diagnostic Test Results:   Ct  Abd/pelvis  ( 5/20)    Evidence of high-grade small bowel obstruction with the point of transition in the mid lower abdomen   No pneumatosis   Surgical consultation is recommended         Results from last 7 days   Lab Units 05/21/21  0517 05/20/21  0909   WBC Thousand/uL 6 76 6 99   HEMOGLOBIN g/dL 12 7 14 0   HEMATOCRIT % 40 3 43 9   PLATELETS Thousands/uL 258 279   NEUTROS ABS Thousands/µL 4 09 4 40         Results from last 7 days   Lab Units 05/21/21  0517 05/20/21  0909   SODIUM mmol/L 141 141   POTASSIUM mmol/L 4 1 4 0   CHLORIDE mmol/L 104 103   CO2 mmol/L 30 30   ANION GAP mmol/L 7 8   BUN mg/dL 11 14   CREATININE mg/dL 0 80 0 74   EGFR ml/min/1 73sq m 97 106   CALCIUM mg/dL 8 4 9 0   MAGNESIUM mg/dL 2 1  --    PHOSPHORUS mg/dL 4 5  --      Results from last 7 days   Lab Units 05/20/21  0909   AST U/L 42   ALT U/L 79*   ALK PHOS U/L 95   TOTAL PROTEIN g/dL 8 1   ALBUMIN g/dL 3 8   TOTAL BILIRUBIN mg/dL 0 33   BILIRUBIN DIRECT mg/dL 0 12         Results from last 7 days   Lab Units 05/21/21  0517 05/20/21  0909   GLUCOSE RANDOM mg/dL 117 114           Results from last 7 days   Lab Units 05/20/21  0909   TROPONIN I ng/mL <0 02 Results from last 7 days   Lab Units 05/20/21  1118   LACTIC ACID mmol/L 0 8           Results from last 7 days   Lab Units 05/20/21  0909   LIPASE u/L 216             Results from last 7 days   Lab Units 05/20/21  1017   CLARITY UA  Clear   COLOR UA  Yellow   SPEC GRAV UA  1 010   PH UA  8 0   GLUCOSE UA mg/dl Negative   KETONES UA mg/dl Negative   BLOOD UA  Negative   PROTEIN UA mg/dl Negative   NITRITE UA  Negative   BILIRUBIN UA  Negative   UROBILINOGEN UA E U /dl 0 2   LEUKOCYTES UA  Negative         ED Treatment:   Medication Administration from 05/20/2021 0831 to 05/20/2021 1753       Date/Time Order Dose Route Action Comments     05/20/2021 0909 pantoprazole (PROTONIX) injection 40 mg 40 mg Intravenous Given      05/20/2021 0912 ondansetron (ZOFRAN) injection 4 mg 4 mg Intravenous Given      05/20/2021 1100 lactated ringers infusion 1,000 mL 0 mL Intravenous Stopped      05/20/2021 0912 lactated ringers infusion 1,000 mL 1,000 mL Intravenous New Bag      05/20/2021 0953 iohexol (OMNIPAQUE) 350 MG/ML injection (SINGLE-DOSE) 100 mL 100 mL Intravenous Given      05/20/2021 1100 lactated ringers infusion 125 mL/hr Intravenous New Bag      05/20/2021 1148 benzocaine (HURRICAINE) 20 % mucosal spray 2 spray 2 spray Mucosal Given      05/20/2021 1204 ondansetron (ZOFRAN) injection 4 mg 4 mg Intravenous Given         History reviewed  No pertinent past medical history    Present on Admission:   Partial small bowel obstruction (HCC)      Admitting Diagnosis: Small bowel obstruction (HCC) [K56 609]  Abdominal pain [R10 9]  Age/Sex: 48 y o  female  Admission Orders:  Scheduled Medications:     Continuous IV Infusions:  lactated ringers, 125 mL/hr, Intravenous, Continuous      PRN Meds:  HYDROmorphone, 0 2 mg, Intravenous, Q3H PRN  morphine injection, 1 mg, Intravenous, Q4H PRN   ( x1  5/20)   ondansetron, 4 mg, Intravenous, Q6H PRN  ( x2  5/20  And  X 1  5/21 thus far)      NGT    Network Utilization Review Department  ATTENTION: Please call with any questions or concerns to [phone] and carefully listen to the prompts so that you are directed to the right person  All voicemails are confidential   Stewart Pastel all requests for admission clinical reviews, approved or denied determinations and any other requests to dedicated fax number below belonging to the campus where the patient is receiving treatment   List of dedicated fax numbers for the Facilities:  [address] DENIALS (Administrative/Medical Necessity) [phone]   [address] (Maternity/NICU/Pediatrics) [phone]   [address] [place] [address] [address] [address] [facility] [place] [address] [phone]

## 2021-05-22 LAB
ANION GAP SERPL CALCULATED.3IONS-SCNC: 9 MMOL/L (ref 4–13)
BUN SERPL-MCNC: 7 MG/DL (ref 5–25)
CALCIUM SERPL-MCNC: 8.5 MG/DL (ref 8.3–10.1)
CHLORIDE SERPL-SCNC: 103 MMOL/L (ref 100–108)
CO2 SERPL-SCNC: 27 MMOL/L (ref 21–32)
CREAT SERPL-MCNC: 0.59 MG/DL (ref 0.6–1.3)
ERYTHROCYTE [DISTWIDTH] IN BLOOD BY AUTOMATED COUNT: 13.9 % (ref 11.6–15.1)
GFR SERPL CREATININE-BSD FRML MDRD: 120 ML/MIN/1.73SQ M
GLUCOSE SERPL-MCNC: 101 MG/DL (ref 65–140)
HCT VFR BLD AUTO: 39.5 % (ref 34.8–46.1)
HGB BLD-MCNC: 12.4 G/DL (ref 11.5–15.4)
MCH RBC QN AUTO: 26.9 PG (ref 26.8–34.3)
MCHC RBC AUTO-ENTMCNC: 31.4 G/DL (ref 31.4–37.4)
MCV RBC AUTO: 86 FL (ref 82–98)
PLATELET # BLD AUTO: 235 THOUSANDS/UL (ref 149–390)
PMV BLD AUTO: 10.8 FL (ref 8.9–12.7)
POTASSIUM SERPL-SCNC: 3.7 MMOL/L (ref 3.5–5.3)
RBC # BLD AUTO: 4.61 MILLION/UL (ref 3.81–5.12)
SODIUM SERPL-SCNC: 139 MMOL/L (ref 136–145)
WBC # BLD AUTO: 8.23 THOUSAND/UL (ref 4.31–10.16)

## 2021-05-22 PROCEDURE — 80048 BASIC METABOLIC PNL TOTAL CA: CPT | Performed by: SURGERY

## 2021-05-22 PROCEDURE — 85027 COMPLETE CBC AUTOMATED: CPT | Performed by: SURGERY

## 2021-05-22 PROCEDURE — NC001 PR NO CHARGE: Performed by: SURGERY

## 2021-05-22 RX ORDER — LANOLIN ALCOHOL/MO/W.PET/CERES
3 CREAM (GRAM) TOPICAL
Status: DISCONTINUED | OUTPATIENT
Start: 2021-05-22 | End: 2021-05-23 | Stop reason: HOSPADM

## 2021-05-22 RX ORDER — AMOXICILLIN 250 MG
1 CAPSULE ORAL
Status: DISCONTINUED | OUTPATIENT
Start: 2021-05-22 | End: 2021-05-23 | Stop reason: HOSPADM

## 2021-05-22 RX ADMIN — Medication 2 SPRAY: at 00:12

## 2021-05-22 RX ADMIN — MELATONIN 3 MG: at 21:40

## 2021-05-22 RX ADMIN — Medication 2 SPRAY: at 09:14

## 2021-05-22 RX ADMIN — DIPHENHYDRAMINE HYDROCHLORIDE 25 MG: 50 INJECTION, SOLUTION INTRAMUSCULAR; INTRAVENOUS at 00:12

## 2021-05-22 RX ADMIN — DOCUSATE SODIUM 50 MG AND SENNOSIDES 8.6 MG 1 TABLET: 8.6; 5 TABLET, FILM COATED ORAL at 21:40

## 2021-05-22 RX ADMIN — DOCUSATE SODIUM 50 MG AND SENNOSIDES 8.6 MG 1 TABLET: 8.6; 5 TABLET, FILM COATED ORAL at 09:20

## 2021-05-22 NOTE — CASE MANAGEMENT
Dx: Partial SBO  Advancing diet  CM met with pt at bedside  Pt speaks Mongolian  CM utilized the blue  phone  Assisted by Drea Parker # [id number]  Pt lives with her son  She reported that she is ADL independent  She ambulates on her own without an assistive device  No dme needs  She reported no hx of VNA or SNF  She feels no need for VNA at this time  Pt denied D&A use  She denied tobacco use  She reported no MH concerns  She follows up with PCP, Dr Emma Meadows  She uses [address] [place] [address] Pharmacy in [facility]  She has prescription coverage through [employer]  She reported no POA or LW  Her son, Timi Tillman is her emergency contact (P) [phone]  Pt drives but does not have a car  She uses public bus for transportation  Her son assists with transportation as well  She said that her son will transport her home at discharge

## 2021-05-22 NOTE — PROGRESS NOTES
Progress Note - General Surgery     MANDIE  [facility] 48 y o  female MRN: [id number]  Unit/Bed#: E2 -01 Encounter: [id number]    Assessment/Plan:  [facility] is doing improving partial small bowel obstruction  PO contrast study yesterday passed with resolution of bowel obstruction  Clear liquid diet started, will advance as tolerated  D/c fluids  Anticipate discharge tomorrow    2  VTE Prophylaxis  - Pharmacologic: Sequential compression device (Venodyne)   - Mechanical: sequential compression device    Subjective:  No acute events  Had a bowel movement   Passed PO contrast challenge    Objective:     Vitals:   Vitals:    05/21/21 0826 05/21/21 1551 05/22/21 0002 05/22/21 0752   BP: 130/84 138/79 131/63 121/67   BP Location: Left arm Left arm Left arm Left arm   Pulse: 73 72 78 71   Resp: 18 18 18 18   Temp: 97 8 °F (36 6 °C) (!) 97 1 °F (36 2 °C) 99 °F (37 2 °C) (!) 97 1 °F (36 2 °C)   TempSrc: Temporal Temporal Tympanic Temporal   SpO2: 96% 97% 97% 96%   Weight:       Height:           I/O:   I/O       05/19 0701 - 05/20 0700 05/20 0701 - 05/21 0700 05/21 0701 - 05/22 0700    I V  (mL/kg)  1000 (13 9)     Total Intake(mL/kg)  1000 (13 9)     Emesis/NG output  400     Total Output  400     Net  +600                  Labs:  Results from last 7 days   Lab Units 05/22/21  0452 05/21/21  0517 05/20/21  0909   WBC Thousand/uL 8 23 6 76 6 99   HEMOGLOBIN g/dL 12 4 12 7 14 0   HEMATOCRIT % 39 5 40 3 43 9   PLATELETS Thousands/uL 235 258 279   NEUTROS PCT %  --  61 64   MONOS PCT %  --  10 10       Results from last 7 days   Lab Units 05/22/21  0533 05/21/21  0517 05/20/21  0909   SODIUM mmol/L 139 141 141   POTASSIUM mmol/L 3 7 4 1 4 0   CHLORIDE mmol/L 103 104 103   CO2 mmol/L 27 30 30   BUN mg/dL 7 11 14   CREATININE mg/dL 0 59* 0 80 0 74   CALCIUM mg/dL 8 5 8 4 9 0   ALK PHOS U/L  --   --  95   ALT U/L  --   --  79*   AST U/L  --   --  42                     Imaging: I personally reviewed the radiology studies, my impression is as follows: As written  Physical Exam: General: AAOx3  Head: normocephalic, atraumatic  Neck: supple, trachea midline  Respiratory: BS b/l  Abdomen: Soft, NT, no rebound/guarding  Heart: RRR, S1s2  Ext: Warm no cyanosis           Some portions of this records may have been generated with voice recognition software  There may be translation, syntax,  or grammatical errors  Occasional wrong word or "sound-a-like" substitutions may have occurred due to the inherent limitations of the voice recognition software  Read the chart carefully and recognize, using context, where substations may have occurred  If you have any questions, please contact the dictating provider for clarification or correction, as needed          Idalia Austin MD  5/22/2021

## 2021-05-23 VITALS
HEART RATE: 70 BPM | TEMPERATURE: 97.2 F | OXYGEN SATURATION: 97 % | DIASTOLIC BLOOD PRESSURE: 68 MMHG | HEIGHT: 60 IN | RESPIRATION RATE: 18 BRPM | SYSTOLIC BLOOD PRESSURE: 112 MMHG | WEIGHT: 159.17 LBS | BODY MASS INDEX: 31.25 KG/M2

## 2021-05-23 LAB
ANION GAP SERPL CALCULATED.3IONS-SCNC: 10 MMOL/L (ref 4–13)
BUN SERPL-MCNC: 10 MG/DL (ref 5–25)
CALCIUM SERPL-MCNC: 8.8 MG/DL (ref 8.3–10.1)
CHLORIDE SERPL-SCNC: 104 MMOL/L (ref 100–108)
CO2 SERPL-SCNC: 28 MMOL/L (ref 21–32)
CREAT SERPL-MCNC: 0.69 MG/DL (ref 0.6–1.3)
GFR SERPL CREATININE-BSD FRML MDRD: 114 ML/MIN/1.73SQ M
GLUCOSE SERPL-MCNC: 97 MG/DL (ref 65–140)
POTASSIUM SERPL-SCNC: 3.6 MMOL/L (ref 3.5–5.3)
SODIUM SERPL-SCNC: 142 MMOL/L (ref 136–145)

## 2021-05-23 PROCEDURE — NC001 PR NO CHARGE: Performed by: SURGERY

## 2021-05-23 PROCEDURE — 80048 BASIC METABOLIC PNL TOTAL CA: CPT | Performed by: SURGERY

## 2021-05-23 RX ORDER — GUAIFENESIN 600 MG
600 TABLET, EXTENDED RELEASE 12 HR ORAL EVERY 12 HOURS SCHEDULED
Status: DISCONTINUED | OUTPATIENT
Start: 2021-05-23 | End: 2021-05-23 | Stop reason: HOSPADM

## 2021-05-23 RX ORDER — GUAIFENESIN 600 MG
600 TABLET, EXTENDED RELEASE 12 HR ORAL EVERY 12 HOURS SCHEDULED
Qty: 8 TABLET | Refills: 0 | Status: SHIPPED | OUTPATIENT
Start: 2021-05-23 | End: 2021-05-27

## 2021-05-23 RX ADMIN — GUAIFENESIN 600 MG: 600 TABLET ORAL at 07:19

## 2021-05-23 NOTE — PLAN OF CARE
Problem: PAIN - ADULT  Goal: Verbalizes/displays adequate comfort level or baseline comfort level  Description: Interventions:  - Encourage patient to monitor pain and request assistance  - Assess pain using appropriate pain scale  - Administer analgesics based on type and severity of pain and evaluate response  - Implement non-pharmacological measures as appropriate and evaluate response  - Consider cultural and social influences on pain and pain management  - Notify physician/advanced practitioner if interventions unsuccessful or patient reports new pain  Outcome: Progressing     Problem: INFECTION - ADULT  Goal: Absence or prevention of progression during hospitalization  Description: INTERVENTIONS:  - Assess and monitor for signs and symptoms of infection  - Monitor lab/diagnostic results  - Monitor all insertion sites, i e  indwelling lines, tubes, and drains  - Monitor endotracheal if appropriate and nasal secretions for changes in amount and color  - [place] appropriate cooling/warming therapies per order  - Administer medications as ordered  - Instruct and encourage patient and family to use good hand hygiene technique  - Identify and instruct in appropriate isolation precautions for identified infection/condition  Outcome: Progressing     Problem: SAFETY ADULT  Goal: Patient will remain free of falls  Description: INTERVENTIONS:  - Assess patient frequently for physical needs  -  Identify cognitive and physical deficits and behaviors that affect risk of falls    -  [place] fall precautions as indicated by assessment   - Educate patient/family on patient safety including physical limitations  - Instruct patient to call for assistance with activity based on assessment  - Modify environment to reduce risk of injury  - Consider OT/PT consult to assist with strengthening/mobility  Outcome: Progressing  Goal: Maintain or return to baseline ADL function  Description: INTERVENTIONS:  -  Assess patient's ability to carry out ADLs; assess patient's baseline for ADL function and identify physical deficits which impact ability to perform ADLs (bathing, care of mouth/teeth, toileting, grooming, dressing, etc )  - Assess/evaluate cause of self-care deficits   - Assess range of motion  - Assess patient's mobility; develop plan if impaired  - Assess patient's need for assistive devices and provide as appropriate  - Encourage maximum independence but intervene and supervise when necessary  - Involve family in performance of ADLs  - Assess for home care needs following discharge   - Consider OT consult to assist with ADL evaluation and planning for discharge  - Provide patient education as appropriate  Outcome: Progressing  Goal: Maintain or return mobility status to optimal level  Description: INTERVENTIONS:  - Assess patient's baseline mobility status (ambulation, transfers, stairs, etc )    - Identify cognitive and physical deficits and behaviors that affect mobility  - Identify mobility aids required to assist with transfers and/or ambulation (gait belt, sit-to-stand, lift, walker, cane, etc )  - [place] fall precautions as indicated by assessment  - Record patient progress and toleration of activity level on Mobility SBAR; progress patient to next Phase/Stage  - Instruct patient to call for assistance with activity based on assessment  - Consider rehabilitation consult to assist with strengthening/weightbearing, etc   Outcome: Progressing     Problem: DISCHARGE PLANNING  Goal: Discharge to home or other facility with appropriate resources  Description: INTERVENTIONS:  - Identify barriers to discharge w/patient and caregiver  - Arrange for needed discharge resources and transportation as appropriate  - Identify discharge learning needs (meds, wound care, etc )  - Arrange for interpretive services to assist at discharge as needed  - Refer to Case Management Department for coordinating discharge planning if the patient needs post-hospital services based on physician/advanced practitioner order or complex needs related to functional status, cognitive ability, or social support system  Outcome: Progressing     Problem: Knowledge Deficit  Goal: Patient/family/caregiver demonstrates understanding of disease process, treatment plan, medications, and discharge instructions  Description: Complete learning assessment and assess knowledge base  Interventions:  - Provide teaching at level of understanding  - Provide teaching via preferred learning methods  Outcome: Progressing     Problem: Potential for Falls  Goal: Patient will remain free of falls  Description: INTERVENTIONS:  - Assess patient frequently for physical needs  -  Identify cognitive and physical deficits and behaviors that affect risk of falls    -  [place] fall precautions as indicated by assessment   - Educate patient/family on patient safety including physical limitations  - Instruct patient to call for assistance with activity based on assessment  - Modify environment to reduce risk of injury  - Consider OT/PT consult to assist with strengthening/mobility  Outcome: Progressing

## 2021-05-23 NOTE — INCIDENTAL FINDINGS
The following findings require follow up:  Radiographic finding   Finding: CT ABDOMEN AND PELVIS WITHOUT IV CONTRAST     INDICATION:   Abdominal distension  Bowel obstruction high-grade suspected  SBO      COMPARISON:  Abdomen and pelvic CT from 5/20/2021      TECHNIQUE:  CT examination of the abdomen and pelvis was performed without intravenous contrast   Axial, sagittal, and coronal 2D reformatted images were created from the source data and submitted for interpretation       Radiation dose length product (DLP) for this visit:  511 mGy-cm   This examination, like all CT scans performed in the [facility], was performed utilizing techniques to minimize radiation dose exposure, including the use of iterative   reconstruction and automated exposure control       Enteric contrast was administered       FINDINGS:     ABDOMEN     LOWER CHEST:  No clinically significant abnormality identified in the visualized lower chest      LIVER/BILIARY TREE:  Unremarkable      GALLBLADDER:  No calcified gallstones  No pericholecystic inflammatory change      SPLEEN:  Unremarkable      PANCREAS:  Unremarkable      ADRENAL GLANDS:  Unremarkable      KIDNEYS/URETERS:  Unremarkable  No hydronephrosis      STOMACH AND BOWEL:  Nasogastric tube tip is in the stomach        Previously seen small bowel obstruction has completely resolved  Small bowel loops are now normal caliber  Oral contrast has reached into the large bowel      APPENDIX:  No findings to suggest appendicitis      ABDOMINOPELVIC CAVITY:  No ascites  No pneumoperitoneum  No lymphadenopathy      VESSELS:  Unremarkable for patient's age      PELVIS     REPRODUCTIVE ORGANS:  Unremarkable for patient's age      URINARY BLADDER:  Unremarkable      ABDOMINAL WALL/INGUINAL REGIONS:  Unremarkable      OSSEOUS STRUCTURES:  No acute fracture or destructive osseous lesion      IMPRESSION:     Previously seen small bowel obstruction has completely resolved     Follow up required: None required with surgery   Follow up should be done within No follow up with surgery needed, follow up with her GI doctor as needed        CT 5/20/21: IMPRESSION:     Evidence of high-grade small bowel obstruction with the point of transition in the mid lower abdomen  No pneumatosis  Surgical consultation is recommended

## 2021-05-23 NOTE — DISCHARGE SUMMARY
Discharge Summary - Mode Nicole 48 y o  female MRN: [id number]    Unit/Bed#: E2 -01 Encounter: [id number]    Admission Date: 2021     Discharge Date: 21    Admitting Diagnosis: Small bowel obstruction ([address] ) [K56 609]  Abdominal pain [R10 9]    Secondary Diagnosis: History reviewed  No pertinent past medical history  Discharge Diagnosis: Same    Procedures Performed:     Consults:     Hospital Course: Mode Nicole is a 48 y o  female who presents with a 24 hour history of abdominal pain associated with N/V  She claims that she saw blood in at least one of the emesis episodes  She states that she had an episode of right abdominal pain one or two months ago  The patient claims that she had normal BM's yesterday and this morning       She has a history of  and apparently hysterectomy  Reportedly she had a colonoscopy 4 years ago and is in the process of getting scheduled for colonoscopy this year       Her WBC was normal at 6 99, her lytes were WNL, and the lactic acid was 0 8  A CT scan with IV contrast shows fluid filled small bowel and stomach with a transition point in the mid abdomen  There is some fecalization of the small bowel contents      The patient was attended at bedside by Dr Ken Pina who placed an N/G tube during her exam      The patient was admitted with a SBO  She had a NGT placed and drained well on the first day  On  she had a repeat PO contrast CT which showed a completely resolved bowel obstruction and she was having bowel movements  The NGT was pulled and diet was advanced  She continued to progress well and was deemed stable for discharge today on 2021  All her questions were answered this morning via the  phone  Subjective 2021 - no acute events overnight   Had bowel movement, tolerated diet    Physical Exam: General: AAOx3  Head: normocephalic, atraumatic  Neck: supple, trachea midline  Respiratory: BS b/l  Abdomen: Soft, NT, no rebound/guarding  Heart: RRR, S1s2  Ext: Warm no cyanosis           Disposition: Home/Stable  Call physician for temperature over 101, wound redness or discharge, vomiting, or intolerance to diet  Discharge Medications:  See after visit summary for reconciled discharge medications provided to patient and family  This text is generated with voice recognition software  There may be translation, syntax,  or grammatical errors  If you have any questions, please contact the dictating provider

## 2021-05-25 ENCOUNTER — TRANSITIONAL CARE MANAGEMENT (OUTPATIENT)
Dept: FAMILY MEDICINE CLINIC | Facility: CLINIC | Age: 56
End: 2021-05-25

## 2021-05-26 ENCOUNTER — IMMUNIZATIONS (OUTPATIENT)
Dept: FAMILY MEDICINE CLINIC | Facility: HOSPITAL | Age: 56
End: 2021-05-26

## 2021-05-26 DIAGNOSIS — Z23 ENCOUNTER FOR IMMUNIZATION: Primary | ICD-10-CM

## 2021-05-26 PROCEDURE — 91301 SARS-COV-2 / COVID-19 MRNA VACCINE (MODERNA) 100 MCG: CPT

## 2021-05-26 PROCEDURE — 0012A SARS-COV-2 / COVID-19 MRNA VACCINE (MODERNA) 100 MCG: CPT

## 2021-06-17 ENCOUNTER — OFFICE VISIT (OUTPATIENT)
Dept: GASTROENTEROLOGY | Facility: MEDICAL CENTER | Age: 56
End: 2021-06-17
Payer: COMMERCIAL

## 2021-06-17 VITALS
TEMPERATURE: 98.7 F | BODY MASS INDEX: 31.72 KG/M2 | SYSTOLIC BLOOD PRESSURE: 126 MMHG | DIASTOLIC BLOOD PRESSURE: 76 MMHG | WEIGHT: 162.4 LBS | HEART RATE: 76 BPM

## 2021-06-17 DIAGNOSIS — K56.609 SBO (SMALL BOWEL OBSTRUCTION) (HCC): Primary | ICD-10-CM

## 2021-06-17 DIAGNOSIS — A04.8 H. PYLORI INFECTION: ICD-10-CM

## 2021-06-17 DIAGNOSIS — Z12.11 COLON CANCER SCREENING: ICD-10-CM

## 2021-06-17 DIAGNOSIS — R10.84 GENERALIZED ABDOMINAL PAIN: ICD-10-CM

## 2021-06-17 PROCEDURE — 99203 OFFICE O/P NEW LOW 30 MIN: CPT | Performed by: INTERNAL MEDICINE

## 2021-06-17 PROCEDURE — 1036F TOBACCO NON-USER: CPT | Performed by: INTERNAL MEDICINE

## 2021-06-17 PROCEDURE — 1111F DSCHRG MED/CURRENT MED MERGE: CPT | Performed by: INTERNAL MEDICINE

## 2021-06-17 NOTE — PATIENT INSTRUCTIONS
The patient is scheduled at [facility] for a colon/egd with Dr Jennifer Rodriguez on 9/3/2021  Miralax/dulcolax prep instructions have been gone over in the office, with the patient, by the MA  The patient is aware that they will receive a call with the arrival time the day prior to procedure and that they will need a  the day of the procedure   I have asked the patient to call with any questions that they might have prior to procedure

## 2021-06-17 NOTE — PROGRESS NOTES
[facility] - Outpatient Consultation  [facility] 48 y o  female MRN: [id number]  Encounter: [id number]    HPI:    [facility] is a 48 y o  female who presents for follow up after recent hospital admission for SBO  She developed acute mid-abdominal pain with nausea and vomiting and went to the ER on 5/20/2021  She did not have a fever  She has not had such a severe episode of pain in the past   CT showed high grade SBO with transition point in the mid small bowel  She has history of C section and hysterectomy; no other abdominal surgeries  She had NGT and bowel rest and improved  CT the following day showed resilution of the obstruction  She also started taking omeprazole because she was having GERD symptoms  She has h/o H  pylori infection 3 years ago and she was treated  Last colonoscopy was 4-5 years ago and she had polyps  She reports FH of pancreatic cancer but no other GI cancers  No FH of Crohn's disease  She does not have anemia  Since leaving the hospital, she has continued to feel nauseated, especially after eating  She has daily BMs without diarrhea or constipation  No blood in the stools  CT a/p 5/20/2021  STOMACH AND BOWEL: Lowella Boeck is disproportionately dilated proximal small bowel in the left abdomen with relatively decompressed mid and distal small bowel loops   The point of transition is in the mid lower abdomen (series 2 image 53) with fecalization within the proximal loop   There is fluid distended stomach   No pneumatosis  CT a/p 5/21/2021  STOMACH AND BOWEL:  Nasogastric tube tip is in the stomach  Previously seen small bowel obstruction has completely resolved  Small bowel loops are now normal caliber  Oral contrast has reached into the large bowel  REVIEW OF SYSTEMS:  CONSTITUTIONAL: Denies any fever, chills, rigors, and weight loss  HEENT: No earache or tinnitus   Denies hearing loss or visual disturbances  CARDIOVASCULAR: No chest pain or palpitations  RESPIRATORY: Denies any cough, hemoptysis, shortness of breath or dyspnea on exertion  GASTROINTESTINAL: As noted in the History of Present Illness  GENITOURINARY: No problems with urination  Denies any hematuria or dysuria  NEUROLOGIC: No dizziness or vertigo, denies headaches  MUSCULOSKELETAL: Denies any muscle or joint pain  SKIN: Denies skin rashes or itching  ENDOCRINE: Denies excessive thirst  Denies intolerance to heat or cold  PSYCHOSOCIAL: Denies depression or anxiety  Denies any recent memory loss  Historical Information   No past medical history on file  Past Surgical History:   Procedure Laterality Date    BILATERAL OOPHORECTOMY Bilateral      SECTION      x2     Social History   Social History     Substance and Sexual Activity   Alcohol Use Not Currently     Social History     Substance and Sexual Activity   Drug Use Never     Social History     Tobacco Use   Smoking Status Never Smoker   Smokeless Tobacco Never Used     No family history on file  Meds/Allergies       Current Outpatient Medications:     Ascorbic Acid (vitamin C) 1000 MG tablet    cholecalciferol (VITAMIN D3) 1,000 units tablet    Multiple Vitamin (multivitamin) tablet    No Known Allergies    Objective   Blood pressure 126/76, pulse 76, temperature 98 7 °F (37 1 °C), weight 73 7 kg (162 lb 6 4 oz)  Body mass index is 31 72 kg/m²  PHYSICAL EXAM:    General Appearance:   Alert, cooperative, no distress   HEENT:   Normocephalic, atraumatic, anicteric  Neck:  Supple, symmetrical, trachea midline   Lungs:   Clear to auscultation bilaterally; no rales, rhonchi or wheezing; respirations unlabored    Heart[de-identified]   Regular rate and rhythm; no murmur, rub, or gallop     Abdomen:   Soft, mild RLQ ttp, non-distended; normal bowel sounds; no masses, no organomegaly    Genitalia:   Deferred    Rectal:   Deferred    Extremities:  No cyanosis, clubbing or edema    Pulses:  2+ and symmetric    Skin:  No jaundice, rashes, or lesions    Lymph nodes:  No palpable cervical lymphadenopathy        Lab Results:   No visits with results within 1 Day(s) from this visit     Latest known visit with results is:   Admission on 05/20/2021, Discharged on 05/23/2021   Component Date Value    WBC 05/20/2021 6 99     RBC 05/20/2021 5 19*    Hemoglobin 05/20/2021 14 0     Hematocrit 05/20/2021 43 9     MCV 05/20/2021 85     MCH 05/20/2021 27 0     MCHC 05/20/2021 31 9     RDW 05/20/2021 14 0     MPV 05/20/2021 10 7     Platelets 34/29/9183 279     nRBC 05/20/2021 0     Neutrophils Relative 05/20/2021 64     Immat GRANS % 05/20/2021 0     Lymphocytes Relative 05/20/2021 21     Monocytes Relative 05/20/2021 10     Eosinophils Relative 05/20/2021 5     Basophils Relative 05/20/2021 0     Neutrophils Absolute 05/20/2021 4 40     Immature Grans Absolute 05/20/2021 0 03     Lymphocytes Absolute 05/20/2021 1 45     Monocytes Absolute 05/20/2021 0 72     Eosinophils Absolute 05/20/2021 0 36     Basophils Absolute 05/20/2021 0 03     Sodium 05/20/2021 141     Potassium 05/20/2021 4 0     Chloride 05/20/2021 103     CO2 05/20/2021 30     ANION GAP 05/20/2021 8     BUN 05/20/2021 14     Creatinine 05/20/2021 0 74     Glucose 05/20/2021 114     Calcium 05/20/2021 9 0     eGFR 05/20/2021 106     Lipase 05/20/2021 216     Total Bilirubin 05/20/2021 0 33     Bilirubin, Direct 05/20/2021 0 12     Alkaline Phosphatase 05/20/2021 95     AST 05/20/2021 42     ALT 05/20/2021 79*    Total Protein 05/20/2021 8 1     Albumin 05/20/2021 3 8     Troponin I 05/20/2021 <0 02     Preg, Serum 05/20/2021 Negative     Ventricular Rate 05/20/2021 66     Atrial Rate 05/20/2021 66     DE Interval 05/20/2021 144     QRSD Interval 05/20/2021 80     QT Interval 05/20/2021 388     QTC Interval 05/20/2021 406     P Axis 05/20/2021 54     QRS Axis 05/20/2021 43     T Wave Axis 05/20/2021 -15     Color, UA 05/20/2021 Yellow     Clarity, UA 05/20/2021 Clear     pH, UA 05/20/2021 8 0     Leukocytes, UA 05/20/2021 Negative     Nitrite, UA 05/20/2021 Negative     Protein, UA 05/20/2021 Negative     Glucose, UA 05/20/2021 Negative     Ketones, UA 05/20/2021 Negative     Urobilinogen, UA 05/20/2021 0 2     Bilirubin, UA 05/20/2021 Negative     Blood, UA 05/20/2021 Negative     Specific Gravity, UA 05/20/2021 1 010     LACTIC ACID 05/20/2021 0 8     Sodium 05/21/2021 141     Potassium 05/21/2021 4 1     Chloride 05/21/2021 104     CO2 05/21/2021 30     ANION GAP 05/21/2021 7     BUN 05/21/2021 11     Creatinine 05/21/2021 0 80     Glucose 05/21/2021 117     Calcium 05/21/2021 8 4     eGFR 05/21/2021 97     Magnesium 05/21/2021 2 1     Phosphorus 05/21/2021 4 5     WBC 05/21/2021 6 76     RBC 05/21/2021 4 79     Hemoglobin 05/21/2021 12 7     Hematocrit 05/21/2021 40 3     MCV 05/21/2021 84     MCH 05/21/2021 26 5*    MCHC 05/21/2021 31 5     RDW 05/21/2021 14 1     MPV 05/21/2021 10 3     Platelets 89/03/4798 258     nRBC 05/21/2021 0     Neutrophils Relative 05/21/2021 61     Immat GRANS % 05/21/2021 0     Lymphocytes Relative 05/21/2021 27     Monocytes Relative 05/21/2021 10     Eosinophils Relative 05/21/2021 2     Basophils Relative 05/21/2021 0     Neutrophils Absolute 05/21/2021 4 09     Immature Grans Absolute 05/21/2021 0 01     Lymphocytes Absolute 05/21/2021 1 82     Monocytes Absolute 05/21/2021 0 65     Eosinophils Absolute 05/21/2021 0 16     Basophils Absolute 05/21/2021 0 03     Sodium 05/22/2021 139     Potassium 05/22/2021 3 7     Chloride 05/22/2021 103     CO2 05/22/2021 27     ANION GAP 05/22/2021 9     BUN 05/22/2021 7     Creatinine 05/22/2021 0 59*    Glucose 05/22/2021 101     Calcium 05/22/2021 8 5     eGFR 05/22/2021 120     WBC 05/22/2021 8 23     RBC 05/22/2021 4 61     Hemoglobin 05/22/2021 12 4     Hematocrit 05/22/2021 39 5     MCV 05/22/2021 86     MCH 05/22/2021 26 9     MCHC 05/22/2021 31 4     RDW 05/22/2021 13 9     Platelets 81/05/5549 235     MPV 05/22/2021 10 8     Sodium 05/23/2021 142     Potassium 05/23/2021 3 6     Chloride 05/23/2021 104     CO2 05/23/2021 28     ANION GAP 05/23/2021 10     BUN 05/23/2021 10     Creatinine 05/23/2021 0 69     Glucose 05/23/2021 97     Calcium 05/23/2021 8 8     eGFR 05/23/2021 114        Lab Results   Component Value Date    WBC 8 23 05/22/2021    HGB 12 4 05/22/2021    HCT 39 5 05/22/2021    MCV 86 05/22/2021     05/22/2021       Lab Results   Component Value Date    SODIUM 142 05/23/2021    K 3 6 05/23/2021     05/23/2021    CO2 28 05/23/2021    AGAP 10 05/23/2021    BUN 10 05/23/2021    CREATININE 0 69 05/23/2021    GLUC 97 05/23/2021    GLUF 103 (H) 07/07/2020    CALCIUM 8 8 05/23/2021    AST 42 05/20/2021    ALT 79 (H) 05/20/2021    ALKPHOS 95 05/20/2021    TP 8 1 05/20/2021    TBILI 0 33 05/20/2021    EGFR 114 05/23/2021       No results found for: CRP    Lab Results   Component Value Date    YGD3HRJCIEAV 1 540 07/07/2020       No results found for: IRON, TIBC, FERRITIN    Radiology Results:   CT abdomen pelvis wo contrast    Result Date: 5/21/2021  Narrative: CT ABDOMEN AND PELVIS WITHOUT IV CONTRAST INDICATION:   Abdominal distension Bowel obstruction high-grade suspected SBO  COMPARISON:  Abdomen and pelvic CT from 5/20/2021  TECHNIQUE:  CT examination of the abdomen and pelvis was performed without intravenous contrast   Axial, sagittal, and coronal 2D reformatted images were created from the source data and submitted for interpretation  Radiation dose length product (DLP) for this visit:  511 mGy-cm     This examination, like all CT scans performed in the [facility], was performed utilizing techniques to minimize radiation dose exposure, including the use of iterative reconstruction and automated exposure control  Enteric contrast was administered  FINDINGS: ABDOMEN LOWER CHEST:  No clinically significant abnormality identified in the visualized lower chest  LIVER/BILIARY TREE:  Unremarkable  GALLBLADDER:  No calcified gallstones  No pericholecystic inflammatory change  SPLEEN:  Unremarkable  PANCREAS:  Unremarkable  ADRENAL GLANDS:  Unremarkable  KIDNEYS/URETERS:  Unremarkable  No hydronephrosis  STOMACH AND BOWEL:  Nasogastric tube tip is in the stomach  Previously seen small bowel obstruction has completely resolved  Small bowel loops are now normal caliber  Oral contrast has reached into the large bowel  APPENDIX:  No findings to suggest appendicitis  ABDOMINOPELVIC CAVITY:  No ascites  No pneumoperitoneum  No lymphadenopathy  VESSELS:  Unremarkable for patient's age  PELVIS REPRODUCTIVE ORGANS:  Unremarkable for patient's age  URINARY BLADDER:  Unremarkable  ABDOMINAL WALL/INGUINAL REGIONS:  Unremarkable  OSSEOUS STRUCTURES:  No acute fracture or destructive osseous lesion  Impression: Previously seen small bowel obstruction has completely resolved  Workstation performed: [id number]     CT abdomen pelvis with contrast    Result Date: 5/20/2021  Narrative: CT ABDOMEN AND PELVIS WITH IV CONTRAST INDICATION:   epigastric pain, possible hematemesis  COMPARISON:  None  TECHNIQUE:  CT examination of the abdomen and pelvis was performed  Axial, sagittal, and coronal 2D reformatted images were created from the source data and submitted for interpretation  Radiation dose length product (DLP) for this visit:  387 mGy-cm   This examination, like all CT scans performed in the [facility], was performed utilizing techniques to minimize radiation dose exposure, including the use of iterative reconstruction and automated exposure control  IV Contrast:  100 mL of iohexol (OMNIPAQUE) Enteric Contrast:  Enteric contrast was not administered   FINDINGS: ABDOMEN LOWER CHEST:  No clinically significant abnormality identified in the visualized lower chest  LIVER/BILIARY TREE:  Unremarkable  GALLBLADDER:  No calcified gallstones  No pericholecystic inflammatory change  SPLEEN:  Unremarkable  PANCREAS:  Unremarkable  ADRENAL GLANDS:  Unremarkable  KIDNEYS/URETERS:  Unremarkable  No hydronephrosis  STOMACH AND BOWEL:  There is disproportionately dilated proximal small bowel in the left abdomen with relatively decompressed mid and distal small bowel loops  The point of transition is in the mid lower abdomen (series 2 image 53) with fecalization within the proximal loop  There is fluid distended stomach  No pneumatosis  APPENDIX:  A normal appendix was visualized  ABDOMINOPELVIC CAVITY:  No ascites  No pneumoperitoneum  No lymphadenopathy  VESSELS:  Unremarkable for patient's age  PELVIS REPRODUCTIVE ORGANS:  Unremarkable for patient's age  URINARY BLADDER:  Unremarkable  ABDOMINAL WALL/INGUINAL REGIONS:  Unremarkable  OSSEOUS STRUCTURES:  No acute fracture or destructive osseous lesion  Impression: Evidence of high-grade small bowel obstruction with the point of transition in the mid lower abdomen  No pneumatosis  Surgical consultation is recommended  I personally discussed this study with [address] [place] [address] on 5/20/2021 at 10:12 AM  Workstation performed: [id number]       ______________________________________________________________________  ASSESSMENT AND PLAN:    Piedad Thompson is a 48 y o  female with h/o H  pylori infection, who presents with a self-limited episode of SBO  Etiology of this is unclear - possibilities are viral gastroenteritis, adhesive disease, Crohn's disease, malignancy  She continue to have nausea but also has GERD and history of H  pylori which may be contributing to this  She is due for colonoscopy based on history of polyps  We are going to schedule colonoscopy and EGD at this time to rule out PUD, H  pylori, malignancy    If her symptoms persists, we are going to also schedule small bowel imaging to r/o IBD or malignancy  I discussed this plan with her and she is in agreement  I discussed the risks of bleeding, infection, and perforation associated with endoscopic procedures  1  SBO (small bowel obstruction) ([address] )    2  Colon cancer screening    3   Generalized abdominal pain    4  H  pylori infection        Orders Placed This Encounter   Procedures    Colonoscopy    EGD

## 2021-08-02 ENCOUNTER — OFFICE VISIT (OUTPATIENT)
Dept: FAMILY MEDICINE CLINIC | Facility: CLINIC | Age: 56
End: 2021-08-02

## 2021-08-02 VITALS
SYSTOLIC BLOOD PRESSURE: 114 MMHG | BODY MASS INDEX: 31.61 KG/M2 | WEIGHT: 161 LBS | DIASTOLIC BLOOD PRESSURE: 80 MMHG | HEIGHT: 60 IN | RESPIRATION RATE: 18 BRPM | HEART RATE: 68 BPM | TEMPERATURE: 98 F | OXYGEN SATURATION: 98 %

## 2021-08-02 DIAGNOSIS — J30.9 ALLERGIC RHINITIS, UNSPECIFIED SEASONALITY, UNSPECIFIED TRIGGER: ICD-10-CM

## 2021-08-02 DIAGNOSIS — K21.9 GASTROESOPHAGEAL REFLUX DISEASE WITHOUT ESOPHAGITIS: ICD-10-CM

## 2021-08-02 DIAGNOSIS — R73.03 PRE-DIABETES: ICD-10-CM

## 2021-08-02 DIAGNOSIS — M54.50 CHRONIC LOW BACK PAIN, UNSPECIFIED BACK PAIN LATERALITY, UNSPECIFIED WHETHER SCIATICA PRESENT: ICD-10-CM

## 2021-08-02 DIAGNOSIS — G89.29 CHRONIC LOW BACK PAIN, UNSPECIFIED BACK PAIN LATERALITY, UNSPECIFIED WHETHER SCIATICA PRESENT: ICD-10-CM

## 2021-08-02 DIAGNOSIS — E78.5 HYPERLIPIDEMIA, UNSPECIFIED HYPERLIPIDEMIA TYPE: ICD-10-CM

## 2021-08-02 DIAGNOSIS — E04.1 THYROID NODULE: ICD-10-CM

## 2021-08-02 DIAGNOSIS — M54.2 NECK PAIN: Primary | ICD-10-CM

## 2021-08-02 PROCEDURE — 3008F BODY MASS INDEX DOCD: CPT | Performed by: INTERNAL MEDICINE

## 2021-08-02 PROCEDURE — 99213 OFFICE O/P EST LOW 20 MIN: CPT | Performed by: FAMILY MEDICINE

## 2021-08-02 RX ORDER — PANTOPRAZOLE SODIUM 40 MG/1
40 TABLET, DELAYED RELEASE ORAL DAILY
Qty: 30 TABLET | Refills: 0 | Status: SHIPPED | OUTPATIENT
Start: 2021-08-02 | End: 2021-10-01 | Stop reason: SDUPTHER

## 2021-08-02 RX ORDER — LIDOCAINE 50 MG/G
1 PATCH TOPICAL DAILY
Qty: 30 PATCH | Refills: 0 | Status: SHIPPED | OUTPATIENT
Start: 2021-08-02

## 2021-08-02 RX ORDER — CETIRIZINE HYDROCHLORIDE 10 MG/1
10 TABLET ORAL DAILY
Qty: 30 TABLET | Refills: 1 | Status: SHIPPED | OUTPATIENT
Start: 2021-08-02 | End: 2021-10-01 | Stop reason: SDUPTHER

## 2021-08-02 RX ORDER — FLUTICASONE PROPIONATE 50 MCG
1 SPRAY, SUSPENSION (ML) NASAL DAILY
Qty: 11.1 ML | Refills: 1 | Status: SHIPPED | OUTPATIENT
Start: 2021-08-02 | End: 2021-10-01 | Stop reason: SDUPTHER

## 2021-08-02 RX ORDER — METHOCARBAMOL 500 MG/1
500 TABLET, FILM COATED ORAL
Qty: 30 TABLET | Refills: 0 | Status: SHIPPED | OUTPATIENT
Start: 2021-08-02 | End: 2021-11-04 | Stop reason: ALTCHOICE

## 2021-08-02 NOTE — ASSESSMENT & PLAN NOTE
-elevated cholesterol and LDL in 7/2020, managed with dietary and lifestyle modifications  -reported hx of elevated cholesterol in the past  -follow up lipid panel  -encouraged importance of healthy lifestyle diet

## 2021-08-02 NOTE — ASSESSMENT & PLAN NOTE
-on pantoprazole 40 mg daily, will continue  -recent hospitalization due to SBO  -follows with GI- has upcoming EGD/colonoscopy scheduled

## 2021-08-02 NOTE — PROGRESS NOTES
Assessment/Plan:    Gastroesophageal reflux disease without esophagitis  -on pantoprazole 40 mg daily, will continue  -recent hospitalization due to SBO  -follows with GI- has upcoming EGD/colonoscopy scheduled     Thyroid nodule  -reported hx of thyroid nodule in the past, prior workup in [place] and treated with medication at that time  -most recent TSH on 7/2020 normal  -physical examination no palpable nodules appreciated  -thyroid U/S ordered  -TSH labs for follow up     Allergic rhinitis  -reported worsening of allergic rhinitis since arriving to [address]  -trial with flonase and zyrtec daily  -avoid triggers/ allergens     Pre-diabetes  -most recent hba1c on 7/2020 pre diabetes range at 5 7%  -repeat hba1c  -encouraged diabetic diet     Hyperlipidemia  -elevated cholesterol and LDL in 7/2020, managed with dietary and lifestyle modifications  -reported hx of elevated cholesterol in the past  -follow up lipid panel  -encouraged importance of healthy lifestyle diet     Neck pain  -hx of neck pain, hx of cervicalgia reported back in [place], completed physical therapies and followed with orthopedics as per patient at that time  -neck x ray ordered  -physical examination remarkable for neck muscle tension  -trial with robaxin prn at night for muscle spasm     Chronic low back pain  -reported hx of disc herniation per patient, completed workup back in [place], not treated in [place] so far  -worsened by movement, shores at home   -lumbar x ray ordered       Diagnoses and all orders for this visit:    Neck pain  -     XR spine lumbar minimum 4 views non injury; Future  -     methocarbamol (ROBAXIN) 500 mg tablet; Take 1 tablet (500 mg total) by mouth daily at bedtime as needed for muscle spasms  -     lidocaine (LIDODERM) 5 %; Apply 1 patch topically daily Remove & Discard patch within 12 hours or as directed by MD    Gastroesophageal reflux disease without esophagitis  -     pantoprazole (PROTONIX) 40 mg tablet;  Take 1 tablet (40 mg total) by mouth daily    Pre-diabetes  -     Comprehensive metabolic panel; Future  -     HEMOGLOBIN A1C W/ EAG ESTIMATION; Future    Hyperlipidemia, unspecified hyperlipidemia type  -     Lipid panel; Future    Allergic rhinitis, unspecified seasonality, unspecified trigger  -     cetirizine (ZyrTEC) 10 mg tablet; Take 1 tablet (10 mg total) by mouth daily  -     fluticasone (FLONASE) 50 mcg/act nasal spray; 1 spray into each nostril daily    BMI 31 0-31 9,adult    Chronic low back pain, unspecified back pain laterality, unspecified whether sciatica present  -     XR spine cervical complete 4 or 5 vw non injury; Future  -     lidocaine (LIDODERM) 5 %; Apply 1 patch topically daily Remove & Discard patch within 12 hours or as directed by MD    Thyroid nodule  -     US thyroid; Future  -     TSH, 3rd generation with Free T4 reflex; Future          Subjective:      Patient ID: Smith Garcia is a 48 y o  female  Case of 47 y/o female who came today for follow up on chronic conditions  The following portions of the patient's history were reviewed and updated as appropriate: allergies, current medications, past family history, past medical history, past social history, past surgical history and problem list     Review of Systems   Constitutional: Negative for fever  HENT: Positive for rhinorrhea  Eyes: Negative for visual disturbance  Respiratory: Negative for cough, shortness of breath and wheezing  Gastrointestinal: Negative for abdominal pain, diarrhea, nausea and vomiting  Musculoskeletal: Positive for back pain ( chronic) and neck pain ( chronic)  Neurological: Negative for headaches  Psychiatric/Behavioral: The patient is not nervous/anxious            Objective:      /80 (BP Location: Left arm, Patient Position: Sitting, Cuff Size: Standard)   Pulse 68   Temp 98 °F (36 7 °C) (Temporal)   Resp 18   Ht 5' (1 524 m)   Wt 73 kg (161 lb)   SpO2 98%   BMI 31 44 kg/m² Physical Exam  Vitals reviewed  Constitutional:       General: She is not in acute distress  Appearance: Normal appearance  She is not ill-appearing, toxic-appearing or diaphoretic  Eyes:      Extraocular Movements: Extraocular movements intact  Cardiovascular:      Rate and Rhythm: Normal rate and regular rhythm  Pulses: Normal pulses  Heart sounds: Normal heart sounds  No murmur heard  Pulmonary:      Effort: Pulmonary effort is normal  No respiratory distress  Breath sounds: Normal breath sounds  No wheezing  Abdominal:      General: Abdomen is flat  Bowel sounds are normal  There is no distension  Palpations: Abdomen is soft  Tenderness: There is no abdominal tenderness  Musculoskeletal:         General: No swelling, deformity or signs of injury  Normal range of motion  Right lower leg: No edema  Left lower leg: No edema  Comments: Neck muscle spasm    Skin:     General: Skin is warm  Coloration: Skin is not jaundiced or pale  Findings: No bruising, erythema, lesion or rash  Neurological:      General: No focal deficit present  Mental Status: She is alert and oriented to person, place, and time  Mental status is at baseline     Psychiatric:         Mood and Affect: Mood normal

## 2021-08-02 NOTE — ASSESSMENT & PLAN NOTE
-reported hx of disc herniation per patient, completed workup back in [place], not treated in [place] so far  -worsened by movement, shores at home   -lumbar x ray ordered

## 2021-08-02 NOTE — ASSESSMENT & PLAN NOTE
-reported worsening of allergic rhinitis since arriving to [address]  -trial with flonase and zyrtec daily  -avoid triggers/ allergens

## 2021-08-03 ENCOUNTER — HOSPITAL ENCOUNTER (OUTPATIENT)
Dept: RADIOLOGY | Facility: HOSPITAL | Age: 56
Discharge: HOME | End: 2021-08-03
Payer: COMMERCIAL

## 2021-08-03 ENCOUNTER — APPOINTMENT (OUTPATIENT)
Dept: LAB | Facility: HOSPITAL | Age: 56
End: 2021-08-03
Payer: COMMERCIAL

## 2021-08-03 ENCOUNTER — TELEPHONE (OUTPATIENT)
Dept: FAMILY MEDICINE CLINIC | Facility: CLINIC | Age: 56
End: 2021-08-03

## 2021-08-03 DIAGNOSIS — M54.50 CHRONIC LOW BACK PAIN, UNSPECIFIED BACK PAIN LATERALITY, UNSPECIFIED WHETHER SCIATICA PRESENT: ICD-10-CM

## 2021-08-03 DIAGNOSIS — G89.29 CHRONIC LOW BACK PAIN, UNSPECIFIED BACK PAIN LATERALITY, UNSPECIFIED WHETHER SCIATICA PRESENT: ICD-10-CM

## 2021-08-03 DIAGNOSIS — E78.5 HYPERLIPIDEMIA, UNSPECIFIED HYPERLIPIDEMIA TYPE: ICD-10-CM

## 2021-08-03 DIAGNOSIS — E04.1 THYROID NODULE: ICD-10-CM

## 2021-08-03 DIAGNOSIS — R73.03 PRE-DIABETES: ICD-10-CM

## 2021-08-03 DIAGNOSIS — M54.2 NECK PAIN: ICD-10-CM

## 2021-08-03 LAB
ALBUMIN SERPL BCP-MCNC: 4.2 G/DL (ref 3–5.2)
ALP SERPL-CCNC: 70 U/L (ref 43–122)
ALT SERPL W P-5'-P-CCNC: 40 U/L
ANION GAP SERPL CALCULATED.3IONS-SCNC: 10 MMOL/L (ref 5–14)
AST SERPL W P-5'-P-CCNC: 38 U/L (ref 14–36)
BILIRUB SERPL-MCNC: 0.55 MG/DL
BUN SERPL-MCNC: 14 MG/DL (ref 5–25)
CALCIUM SERPL-MCNC: 8.9 MG/DL (ref 8.4–10.2)
CHLORIDE SERPL-SCNC: 102 MMOL/L (ref 97–108)
CHOLEST SERPL-MCNC: 241 MG/DL
CO2 SERPL-SCNC: 29 MMOL/L (ref 22–30)
CREAT SERPL-MCNC: 0.69 MG/DL (ref 0.6–1.2)
EST. AVERAGE GLUCOSE BLD GHB EST-MCNC: 117 MG/DL
GFR SERPL CREATININE-BSD FRML MDRD: 114 ML/MIN/1.73SQ M
GLUCOSE P FAST SERPL-MCNC: 102 MG/DL (ref 70–99)
HBA1C MFR BLD: 5.7 %
HDLC SERPL-MCNC: 53 MG/DL
LDLC SERPL CALC-MCNC: 167 MG/DL
NONHDLC SERPL-MCNC: 188 MG/DL
POTASSIUM SERPL-SCNC: 4 MMOL/L (ref 3.6–5)
PROT SERPL-MCNC: 7.6 G/DL (ref 5.9–8.4)
SODIUM SERPL-SCNC: 141 MMOL/L (ref 137–147)
TRIGL SERPL-MCNC: 106 MG/DL
TSH SERPL DL<=0.05 MIU/L-ACNC: 0.97 UIU/ML (ref 0.47–4.68)

## 2021-08-03 PROCEDURE — 72110 X-RAY EXAM L-2 SPINE 4/>VWS: CPT

## 2021-08-03 PROCEDURE — 84443 ASSAY THYROID STIM HORMONE: CPT

## 2021-08-03 PROCEDURE — 36415 COLL VENOUS BLD VENIPUNCTURE: CPT

## 2021-08-03 PROCEDURE — 80061 LIPID PANEL: CPT

## 2021-08-03 PROCEDURE — 80053 COMPREHEN METABOLIC PANEL: CPT

## 2021-08-03 PROCEDURE — 72050 X-RAY EXAM NECK SPINE 4/5VWS: CPT

## 2021-08-03 PROCEDURE — 83036 HEMOGLOBIN GLYCOSYLATED A1C: CPT

## 2021-08-21 ENCOUNTER — HOSPITAL ENCOUNTER (EMERGENCY)
Facility: HOSPITAL | Age: 56
Discharge: HOME | End: 2021-08-21
Attending: EMERGENCY MEDICINE | Admitting: EMERGENCY MEDICINE
Payer: COMMERCIAL

## 2021-08-21 VITALS
BODY MASS INDEX: 31.58 KG/M2 | HEART RATE: 69 BPM | DIASTOLIC BLOOD PRESSURE: 51 MMHG | RESPIRATION RATE: 18 BRPM | OXYGEN SATURATION: 99 % | SYSTOLIC BLOOD PRESSURE: 102 MMHG | WEIGHT: 161.7 LBS | TEMPERATURE: 99.2 F
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
## 2021-08-21 DIAGNOSIS — T14.8XXA ABRASION: ICD-10-CM

## 2021-08-21 DIAGNOSIS — L03.90 CELLULITIS: ICD-10-CM

## 2021-08-21 PROCEDURE — 99284 EMERGENCY DEPT VISIT MOD MDM: CPT | Performed by: EMERGENCY MEDICINE

## 2021-08-21 PROCEDURE — 99283 EMERGENCY DEPT VISIT LOW MDM: CPT

## 2021-08-21 RX ORDER — CEPHALEXIN 500 MG/1
500 CAPSULE ORAL 4 TIMES DAILY
Qty: 28 CAPSULE | Refills: 0 | Status: SHIPPED | OUTPATIENT
Start: 2021-08-21 | End: 2021-08-28

## 2021-08-21 NOTE — ED PROVIDER NOTES
History  Chief Complaint   Patient presents with    Fall     Pt fell and hit left knee on Tuesday  Pt has abrasion present  Pt c/o pain but did not take medication today for pain  Pt denies LOC  56-year-old female presents with complaint of left knee pain  She reports that five days ago she tripped and suffered an abrasion to her left knee area  She has been ambulatory on the knee but complains of increasing pain and redness around the area of abrasion  She denies any numbness, weakness, tingling, or other acute issues  She did not strike her head nor did she lose consciousness  Fall  Mechanism of injury: fall    Injury location:  Leg  Leg injury location:  L knee  Incident location:  Home  Time since incident:  5 days  Arrived directly from scene: no    Prior to arrival data:     Patient ambulatory at scene: no      Blood loss:  None    Responsiveness at scene:  Alert    Orientation at scene:  Person, place, situation and time    Loss of consciousness: no      Amnesic to event: no    Associated symptoms: no abdominal pain, no chest pain, no difficulty breathing, no headaches, no loss of consciousness, no neck pain and no vomiting        Prior to Admission Medications   Prescriptions Last Dose Informant Patient Reported? Taking?    Ascorbic Acid (vitamin C) 1000 MG tablet   No No   Sig: Take 1 tablet (1,000 mg total) by mouth every 12 (twelve) hours   Multiple Vitamin (multivitamin) tablet   No No   Sig: Take 1 tablet by mouth daily   cetirizine (ZyrTEC) 10 mg tablet   No No   Sig: Take 1 tablet (10 mg total) by mouth daily   cholecalciferol (VITAMIN D3) 1,000 units tablet   No No   Sig: Take 2 tablets (2,000 Units total) by mouth daily   fluticasone (FLONASE) 50 mcg/act nasal spray   No No   Si spray into each nostril daily   lidocaine (LIDODERM) 5 %   No No   Sig: Apply 1 patch topically daily Remove & Discard patch within 12 hours or as directed by MD   methocarbamol (ROBAXIN) 500 mg tablet   No No   Sig: Take 1 tablet (500 mg total) by mouth daily at bedtime as needed for muscle spasms   pantoprazole (PROTONIX) 40 mg tablet   No No   Sig: Take 1 tablet (40 mg total) by mouth daily      Facility-Administered Medications: None       History reviewed  No pertinent past medical history  Past Surgical History:   Procedure Laterality Date    BILATERAL OOPHORECTOMY Bilateral      SECTION      x2       History reviewed  No pertinent family history  I have reviewed and agree with the history as documented  E-Cigarette/Vaping    E-Cigarette Use Never User      E-Cigarette/Vaping Substances     Social History     Tobacco Use    Smoking status: Never Smoker    Smokeless tobacco: Never Used   Vaping Use    Vaping Use: Never used   Substance Use Topics    Alcohol use: Not Currently    Drug use: Never       Review of Systems   Cardiovascular: Negative for chest pain  Gastrointestinal: Negative for abdominal pain and vomiting  Musculoskeletal: Negative for neck pain  Skin: Positive for color change and wound  Neurological: Negative for loss of consciousness and headaches  All other systems reviewed and are negative  Physical Exam  Physical Exam  Vitals and nursing note reviewed  Constitutional:       General: She is not in acute distress  Appearance: Normal appearance  She is well-developed  She is not ill-appearing or toxic-appearing  HENT:      Head: Normocephalic  Right Ear: External ear normal       Left Ear: External ear normal       Nose: Nose normal       Mouth/Throat:      Mouth: Mucous membranes are moist       Pharynx: Oropharynx is clear  Eyes:      General: No scleral icterus  Conjunctiva/sclera: Conjunctivae normal       Pupils: Pupils are equal, round, and reactive to light  Pulmonary:      Effort: Pulmonary effort is normal  No respiratory distress  Musculoskeletal:        Legs:    Skin:     General: Skin is warm and dry        Capillary Refill: Capillary refill takes less than 2 seconds  Neurological:      General: No focal deficit present  Mental Status: She is alert and oriented to person, place, and time  Sensory: No sensory deficit  Motor: No weakness  Gait: Gait abnormal    Psychiatric:         Mood and Affect: Mood normal          Behavior: Behavior normal          Vital Signs  ED Triage Vitals [08/21/21 1408]   Temperature Pulse Respirations Blood Pressure SpO2   99 2 °F (37 3 °C) 69 18 102/51 99 %      Temp Source Heart Rate Source Patient Position - Orthostatic VS BP Location FiO2 (%)   Tympanic Monitor -- -- --      Pain Score       --           Vitals:    08/21/21 1408   BP: 102/51   Pulse: 69         Visual Acuity      ED Medications  Medications - No data to display    Diagnostic Studies  Results Reviewed     None                 No orders to display              Procedures  Procedures         ED Course                                           MDM    Disposition  Final diagnoses:   Fall, initial encounter   Abrasion   Cellulitis     Time reflects when diagnosis was documented in both MDM as applicable and the Disposition within this note     Time User Action Codes Description Comment    8/21/2021  2:22 PM Hazel Mcgovern Add [W19  PKWJ] Fall, initial encounter     8/21/2021  2:22 PM Sonam Baker  8XXA] Abrasion     8/21/2021  2:22 PM Hazel Mcgovern Add [L03 90] Cellulitis       ED Disposition     ED Disposition Condition Date/Time Comment    Discharge Stable Sat Aug 21, 2021  2:22 PM [facility]/self care              Follow-up Information     Follow up With Specialties Details Why Contact Info Additional [address] [facility] Emergency Department Emergency Medicine  If symptoms worsen [address] [id number] [address] [facility] Emergency Department          Patient's Medications   Discharge Prescriptions CEPHALEXIN (KEFLEX) 500 MG CAPSULE    Take 1 capsule (500 mg total) by mouth 4 (four) times a day for 7 days       Start Date: 8/21/2021 End Date: 8/28/2021       Order Dose: 500 mg       Quantity: 28 capsule    Refills: 0     No discharge procedures on file      PDMP Review     None          ED Provider  Electronically Signed by           Cristobal Haskins DO  08/21/21 2232

## 2021-09-02 ENCOUNTER — ANESTHESIA (OUTPATIENT)
Dept: ANESTHESIOLOGY | Facility: HOSPITAL | Age: 56
End: 2021-09-02

## 2021-09-02 ENCOUNTER — TELEPHONE (OUTPATIENT)
Dept: GASTROENTEROLOGY | Facility: MEDICAL CENTER | Age: 56
End: 2021-09-02

## 2021-09-02 ENCOUNTER — ANESTHESIA EVENT (OUTPATIENT)
Dept: ANESTHESIOLOGY | Facility: HOSPITAL | Age: 56
End: 2021-09-02

## 2021-09-02 NOTE — ANESTHESIA PREPROCEDURE EVALUATION
Procedure:  PRE-OP ONLY    Relevant Problems   CARDIO   (+) Hyperlipidemia      GI/HEPATIC   (+) Gastroesophageal reflux disease without esophagitis   (+) Partial small bowel obstruction (HCC)      MUSCULOSKELETAL   (+) Chronic low back pain             Anesthesia Plan  ASA Score- 2     Anesthesia Type- IV sedation with anesthesia with ASA Monitors  Additional Monitors:   Airway Plan:           Plan Factors-Exercise tolerance (METS): >4 METS  Chart reviewed  Existing labs reviewed  Patient summary reviewed  Patient is not a current smoker  Patient instructed to abstain from smoking on day of procedure  Patient did not smoke on day of surgery  Induction- intravenous  Postoperative Plan-     Informed Consent- Anesthetic plan and risks discussed with patient

## 2021-09-03 ENCOUNTER — ANESTHESIA EVENT (OUTPATIENT)
Dept: GASTROENTEROLOGY | Facility: MEDICAL CENTER | Age: 56
End: 2021-09-03

## 2021-09-03 ENCOUNTER — HOSPITAL ENCOUNTER (OUTPATIENT)
Dept: GASTROENTEROLOGY | Facility: MEDICAL CENTER | Age: 56
Setting detail: OUTPATIENT SURGERY
Discharge: HOME | End: 2021-09-03
Admitting: INTERNAL MEDICINE
Payer: COMMERCIAL

## 2021-09-03 ENCOUNTER — ANESTHESIA (OUTPATIENT)
Dept: GASTROENTEROLOGY | Facility: MEDICAL CENTER | Age: 56
End: 2021-09-03

## 2021-09-03 VITALS
OXYGEN SATURATION: 100 % | HEIGHT: 60 IN | WEIGHT: 161 LBS | BODY MASS INDEX: 31.61 KG/M2 | DIASTOLIC BLOOD PRESSURE: 71 MMHG | RESPIRATION RATE: 17 BRPM | SYSTOLIC BLOOD PRESSURE: 114 MMHG | HEART RATE: 67 BPM | TEMPERATURE: 97.3 F

## 2021-09-03 DIAGNOSIS — A04.8 H. PYLORI INFECTION: ICD-10-CM

## 2021-09-03 DIAGNOSIS — Z12.11 COLON CANCER SCREENING: ICD-10-CM

## 2021-09-03 DIAGNOSIS — K56.609 SBO (SMALL BOWEL OBSTRUCTION) (HCC): ICD-10-CM

## 2021-09-03 DIAGNOSIS — R10.84 GENERALIZED ABDOMINAL PAIN: ICD-10-CM

## 2021-09-03 PROCEDURE — 43239 EGD BIOPSY SINGLE/MULTIPLE: CPT | Performed by: INTERNAL MEDICINE

## 2021-09-03 PROCEDURE — 88305 TISSUE EXAM BY PATHOLOGIST: CPT | Performed by: PATHOLOGY

## 2021-09-03 PROCEDURE — 45380 COLONOSCOPY AND BIOPSY: CPT | Performed by: INTERNAL MEDICINE

## 2021-09-03 RX ORDER — LIDOCAINE HYDROCHLORIDE 20 MG/ML
INJECTION, SOLUTION EPIDURAL; INFILTRATION; INTRACAUDAL; PERINEURAL AS NEEDED
Status: DISCONTINUED | OUTPATIENT
Start: 2021-09-03 | End: 2021-09-03

## 2021-09-03 RX ORDER — ONDANSETRON 2 MG/ML
4 INJECTION INTRAMUSCULAR; INTRAVENOUS ONCE AS NEEDED
Status: DISCONTINUED | OUTPATIENT
Start: 2021-09-03 | End: 2021-09-07 | Stop reason: HOSPADM

## 2021-09-03 RX ORDER — SODIUM CHLORIDE 9 MG/ML
125 INJECTION, SOLUTION INTRAVENOUS CONTINUOUS
Status: DISCONTINUED | OUTPATIENT
Start: 2021-09-03 | End: 2021-09-07 | Stop reason: HOSPADM

## 2021-09-03 RX ORDER — PROPOFOL 10 MG/ML
INJECTION, EMULSION INTRAVENOUS AS NEEDED
Status: DISCONTINUED | OUTPATIENT
Start: 2021-09-03 | End: 2021-09-03

## 2021-09-03 RX ADMIN — PROPOFOL 50 MG: 10 INJECTION, EMULSION INTRAVENOUS at 07:44

## 2021-09-03 RX ADMIN — SODIUM CHLORIDE 125 ML/HR: 0.9 INJECTION, SOLUTION INTRAVENOUS at 07:13

## 2021-09-03 RX ADMIN — PROPOFOL 50 MG: 10 INJECTION, EMULSION INTRAVENOUS at 07:40

## 2021-09-03 RX ADMIN — LIDOCAINE HYDROCHLORIDE 80 MG: 20 INJECTION, SOLUTION EPIDURAL; INFILTRATION; INTRACAUDAL; PERINEURAL at 07:33

## 2021-09-03 RX ADMIN — Medication 40 MG: at 07:48

## 2021-09-03 RX ADMIN — PROPOFOL 50 MG: 10 INJECTION, EMULSION INTRAVENOUS at 07:53

## 2021-09-03 RX ADMIN — PROPOFOL 150 MG: 10 INJECTION, EMULSION INTRAVENOUS at 07:33

## 2021-09-03 RX ADMIN — PROPOFOL 50 MG: 10 INJECTION, EMULSION INTRAVENOUS at 07:58

## 2021-09-03 RX ADMIN — PROPOFOL 50 MG: 10 INJECTION, EMULSION INTRAVENOUS at 07:49

## 2021-09-03 NOTE — H&P
History and Physical -  [facility]  Carlos Landrum 48 y o  female MRN: [id number]                  HPI: Carlos Landrum is a 48y o  year old female who presents for nausea, vomiting, h/o polyps  REVIEW OF SYSTEMS: Per the HPI, and otherwise unremarkable  Historical Information   No past medical history on file  Past Surgical History:   Procedure Laterality Date    BILATERAL OOPHORECTOMY Bilateral      SECTION      x2     Social History   Social History     Substance and Sexual Activity   Alcohol Use Not Currently     Social History     Substance and Sexual Activity   Drug Use Never     Social History     Tobacco Use   Smoking Status Never Smoker   Smokeless Tobacco Never Used     No family history on file  Meds/Allergies     (Not in a hospital admission)      No Known Allergies    Objective     There were no vitals taken for this visit  PHYSICAL EXAMINATION:    General Appearance:   Alert, cooperative, no distress   HEENT:  Normocephalic, atraumatic, anicteric  Neck supple, symmetrical, trachea midline  Lungs:   Equal chest rise and unlabored breathing, normal effort, no coughing  Cardiovascular:   No visualized JVD  Abdomen:   No abdominal distension  Skin:   No jaundice, rashes, or lesions  Musculoskeletal:   Normal range of motion visualized  Psych:  Normal affect and normal insight  Neuro:  Alert and appropriate  ASSESSMENT/PLAN:  This is a 48y o  year old female here for EGD and colonoscopy, and she is stable and optimized for her procedure

## 2021-09-03 NOTE — ANESTHESIA PREPROCEDURE EVALUATION
Procedure:  COLONOSCOPY  EGD    Relevant Problems   ANESTHESIA (within normal limits)      CARDIO   (+) Hyperlipidemia      ENDO (within normal limits)      GI/HEPATIC   (+) Gastroesophageal reflux disease without esophagitis   (+) Partial small bowel obstruction (HCC)      /RENAL (within normal limits)      GYN (within normal limits)      HEMATOLOGY (within normal limits)      MUSCULOSKELETAL   (+) Chronic low back pain      NEURO/PSYCH (within normal limits)      PULMONARY (within normal limits)        Physical Exam    Airway    Mallampati score: III  TM Distance: >3 FB  Neck ROM: full     Dental   No notable dental hx     Cardiovascular  Rhythm: regular, Rate: normal, Cardiovascular exam normal    Pulmonary  Pulmonary exam normal Breath sounds clear to auscultation,     Other Findings        Anesthesia Plan  ASA Score- 2     Anesthesia Type- IV sedation with anesthesia with ASA Monitors  Additional Monitors:   Airway Plan:           Plan Factors-Exercise tolerance (METS): >4 METS  Chart reviewed  Existing labs reviewed  Patient summary reviewed  Patient is not a current smoker  Patient instructed to abstain from smoking on day of procedure  Patient did not smoke on day of surgery  Induction- intravenous  Postoperative Plan-     Informed Consent- Anesthetic plan and risks discussed with patient

## 2021-10-01 ENCOUNTER — OFFICE VISIT (OUTPATIENT)
Dept: FAMILY MEDICINE CLINIC | Facility: CLINIC | Age: 56
End: 2021-10-01

## 2021-10-01 VITALS
SYSTOLIC BLOOD PRESSURE: 104 MMHG | WEIGHT: 164 LBS | HEART RATE: 74 BPM | RESPIRATION RATE: 20 BRPM | BODY MASS INDEX: 32.03 KG/M2 | DIASTOLIC BLOOD PRESSURE: 70 MMHG | TEMPERATURE: 97.3 F | OXYGEN SATURATION: 95 %

## 2021-10-01 DIAGNOSIS — E78.5 HYPERLIPIDEMIA, UNSPECIFIED HYPERLIPIDEMIA TYPE: ICD-10-CM

## 2021-10-01 DIAGNOSIS — J30.9 ALLERGIC RHINITIS, UNSPECIFIED SEASONALITY, UNSPECIFIED TRIGGER: ICD-10-CM

## 2021-10-01 DIAGNOSIS — E66.09 CLASS 1 OBESITY DUE TO EXCESS CALORIES WITHOUT SERIOUS COMORBIDITY WITH BODY MASS INDEX (BMI) OF 32.0 TO 32.9 IN ADULT: ICD-10-CM

## 2021-10-01 DIAGNOSIS — K21.9 GASTROESOPHAGEAL REFLUX DISEASE WITHOUT ESOPHAGITIS: Primary | ICD-10-CM

## 2021-10-01 PROCEDURE — 99213 OFFICE O/P EST LOW 20 MIN: CPT | Performed by: PHYSICIAN ASSISTANT

## 2021-10-01 RX ORDER — CETIRIZINE HYDROCHLORIDE 10 MG/1
10 TABLET ORAL DAILY
Qty: 30 TABLET | Refills: 1 | Status: SHIPPED | OUTPATIENT
Start: 2021-10-01

## 2021-10-01 RX ORDER — PANTOPRAZOLE SODIUM 40 MG/1
40 TABLET, DELAYED RELEASE ORAL DAILY
Qty: 30 TABLET | Refills: 0 | Status: SHIPPED | OUTPATIENT
Start: 2021-10-01 | End: 2021-11-04 | Stop reason: SDUPTHER

## 2021-10-01 RX ORDER — FLUTICASONE PROPIONATE 50 MCG
1 SPRAY, SUSPENSION (ML) NASAL DAILY
Qty: 11.1 ML | Refills: 1 | Status: SHIPPED | OUTPATIENT
Start: 2021-10-01

## 2021-11-04 ENCOUNTER — OFFICE VISIT (OUTPATIENT)
Dept: FAMILY MEDICINE CLINIC | Facility: CLINIC | Age: 56
End: 2021-11-04

## 2021-11-04 VITALS
DIASTOLIC BLOOD PRESSURE: 70 MMHG | HEIGHT: 60 IN | RESPIRATION RATE: 16 BRPM | OXYGEN SATURATION: 99 % | SYSTOLIC BLOOD PRESSURE: 106 MMHG | BODY MASS INDEX: 31.65 KG/M2 | TEMPERATURE: 98 F | WEIGHT: 161.2 LBS | HEART RATE: 68 BPM

## 2021-11-04 DIAGNOSIS — H93.8X2 SENSATION OF FULLNESS IN LEFT EAR: ICD-10-CM

## 2021-11-04 DIAGNOSIS — G89.29 CHRONIC LOW BACK PAIN, UNSPECIFIED BACK PAIN LATERALITY, UNSPECIFIED WHETHER SCIATICA PRESENT: ICD-10-CM

## 2021-11-04 DIAGNOSIS — E04.1 THYROID NODULE: ICD-10-CM

## 2021-11-04 DIAGNOSIS — R06.83 SNORING: Primary | ICD-10-CM

## 2021-11-04 DIAGNOSIS — M25.511 CHRONIC PAIN OF BOTH SHOULDERS: ICD-10-CM

## 2021-11-04 DIAGNOSIS — M54.50 CHRONIC LOW BACK PAIN, UNSPECIFIED BACK PAIN LATERALITY, UNSPECIFIED WHETHER SCIATICA PRESENT: ICD-10-CM

## 2021-11-04 DIAGNOSIS — K21.9 GASTROESOPHAGEAL REFLUX DISEASE WITHOUT ESOPHAGITIS: ICD-10-CM

## 2021-11-04 DIAGNOSIS — R74.01 TRANSAMINITIS: ICD-10-CM

## 2021-11-04 DIAGNOSIS — M54.2 NECK PAIN: ICD-10-CM

## 2021-11-04 DIAGNOSIS — G89.29 CHRONIC PAIN OF BOTH SHOULDERS: ICD-10-CM

## 2021-11-04 DIAGNOSIS — R73.03 PRE-DIABETES: ICD-10-CM

## 2021-11-04 DIAGNOSIS — M25.512 CHRONIC PAIN OF BOTH SHOULDERS: ICD-10-CM

## 2021-11-04 PROCEDURE — 99213 OFFICE O/P EST LOW 20 MIN: CPT | Performed by: FAMILY MEDICINE

## 2021-11-04 RX ORDER — PANTOPRAZOLE SODIUM 40 MG/1
40 TABLET, DELAYED RELEASE ORAL DAILY
Qty: 30 TABLET | Refills: 0 | Status: SHIPPED | OUTPATIENT
Start: 2021-11-04 | End: 2021-11-15

## 2021-11-04 RX ORDER — METHOCARBAMOL 500 MG/1
500 TABLET, FILM COATED ORAL
Qty: 30 TABLET | Refills: 0 | Status: SHIPPED | OUTPATIENT
Start: 2021-11-04

## 2021-11-10 ENCOUNTER — HOSPITAL ENCOUNTER (OUTPATIENT)
Dept: ULTRASOUND IMAGING | Facility: HOSPITAL | Age: 56
Discharge: HOME | End: 2021-11-10
Payer: COMMERCIAL

## 2021-11-10 DIAGNOSIS — E04.1 THYROID NODULE: ICD-10-CM

## 2021-11-10 PROCEDURE — 76536 US EXAM OF HEAD AND NECK: CPT

## 2021-11-15 ENCOUNTER — OFFICE VISIT (OUTPATIENT)
Dept: GASTROENTEROLOGY | Facility: MEDICAL CENTER | Age: 56
End: 2021-11-15
Payer: COMMERCIAL

## 2021-11-15 VITALS
HEART RATE: 64 BPM | BODY MASS INDEX: 31.91 KG/M2 | SYSTOLIC BLOOD PRESSURE: 132 MMHG | WEIGHT: 163.4 LBS | DIASTOLIC BLOOD PRESSURE: 78 MMHG | TEMPERATURE: 97.1 F

## 2021-11-15 DIAGNOSIS — K56.609 SBO (SMALL BOWEL OBSTRUCTION) (HCC): Primary | ICD-10-CM

## 2021-11-15 DIAGNOSIS — K21.9 GASTROESOPHAGEAL REFLUX DISEASE WITHOUT ESOPHAGITIS: ICD-10-CM

## 2021-11-15 PROCEDURE — 99214 OFFICE O/P EST MOD 30 MIN: CPT | Performed by: INTERNAL MEDICINE

## 2021-11-15 RX ORDER — OMEPRAZOLE 20 MG/1
20 CAPSULE, DELAYED RELEASE ORAL DAILY
Qty: 90 CAPSULE | Refills: 1 | Status: SHIPPED | OUTPATIENT
Start: 2021-11-15

## 2021-11-19 ENCOUNTER — OFFICE VISIT (OUTPATIENT)
Dept: OBGYN CLINIC | Facility: MEDICAL CENTER | Age: 56
End: 2021-11-19
Payer: COMMERCIAL

## 2021-11-19 VITALS
HEART RATE: 73 BPM | HEIGHT: 60 IN | BODY MASS INDEX: 31.77 KG/M2 | SYSTOLIC BLOOD PRESSURE: 116 MMHG | WEIGHT: 161.8 LBS | DIASTOLIC BLOOD PRESSURE: 76 MMHG

## 2021-11-19 DIAGNOSIS — G89.29 CHRONIC NECK PAIN: Primary | ICD-10-CM

## 2021-11-19 DIAGNOSIS — M47.812 CERVICAL SPONDYLOSIS: ICD-10-CM

## 2021-11-19 DIAGNOSIS — G89.29 CHRONIC BILATERAL LOW BACK PAIN WITHOUT SCIATICA: ICD-10-CM

## 2021-11-19 DIAGNOSIS — M54.2 CHRONIC NECK PAIN: Primary | ICD-10-CM

## 2021-11-19 DIAGNOSIS — M54.50 CHRONIC BILATERAL LOW BACK PAIN WITHOUT SCIATICA: ICD-10-CM

## 2021-11-19 DIAGNOSIS — Z87.39 HISTORY OF OSTEOPOROSIS: ICD-10-CM

## 2021-11-19 DIAGNOSIS — M47.816 LUMBAR SPONDYLOSIS: ICD-10-CM

## 2021-11-19 PROCEDURE — 99244 OFF/OP CNSLTJ NEW/EST MOD 40: CPT | Performed by: STUDENT IN AN ORGANIZED HEALTH CARE EDUCATION/TRAINING PROGRAM

## 2021-11-19 RX ORDER — NAPROXEN 500 MG/1
500 TABLET ORAL 2 TIMES DAILY WITH MEALS
Qty: 60 TABLET | Refills: 0 | Status: SHIPPED | OUTPATIENT
Start: 2021-11-19

## 2021-11-23 ENCOUNTER — TELEPHONE (OUTPATIENT)
Dept: FAMILY MEDICINE CLINIC | Facility: CLINIC | Age: 56
End: 2021-11-23

## 2021-11-29 ENCOUNTER — HOSPITAL ENCOUNTER (OUTPATIENT)
Dept: BONE DENSITY | Facility: CLINIC | Age: 56
Discharge: HOME | End: 2021-11-29
Payer: COMMERCIAL

## 2021-11-29 DIAGNOSIS — Z87.39 HISTORY OF OSTEOPOROSIS: ICD-10-CM

## 2021-11-29 DIAGNOSIS — Z13.820 SCREENING FOR OSTEOPOROSIS: ICD-10-CM

## 2021-11-29 PROCEDURE — 77080 DXA BONE DENSITY AXIAL: CPT

## 2021-12-14 ENCOUNTER — TELEPHONE (OUTPATIENT)
Dept: FAMILY MEDICINE CLINIC | Facility: CLINIC | Age: 56
End: 2021-12-14

## 2021-12-16 ENCOUNTER — TELEPHONE (OUTPATIENT)
Dept: FAMILY MEDICINE CLINIC | Facility: CLINIC | Age: 56
End: 2021-12-16

## 2021-12-16 DIAGNOSIS — M85.80 OSTEOPENIA, UNSPECIFIED LOCATION: Primary | ICD-10-CM

## 2021-12-16 RX ORDER — B-COMPLEX WITH VITAMIN C
1 TABLET ORAL
Qty: 30 TABLET | Refills: 2 | Status: SHIPPED | OUTPATIENT
Start: 2021-12-16

## 2022-01-11 ENCOUNTER — OFFICE VISIT (OUTPATIENT)
Dept: SLEEP CENTER | Facility: CLINIC | Age: 57
End: 2022-01-11
Payer: COMMERCIAL

## 2022-01-11 ENCOUNTER — HOSPITAL ENCOUNTER (OUTPATIENT)
Dept: SLEEP CENTER | Facility: CLINIC | Age: 57
Discharge: HOME | End: 2022-01-11
Payer: COMMERCIAL

## 2022-01-11 VITALS
WEIGHT: 159 LBS | OXYGEN SATURATION: 98 % | SYSTOLIC BLOOD PRESSURE: 116 MMHG | HEART RATE: 66 BPM | DIASTOLIC BLOOD PRESSURE: 70 MMHG | BODY MASS INDEX: 31.22 KG/M2 | HEIGHT: 60 IN

## 2022-01-11 DIAGNOSIS — R06.83 SNORING: ICD-10-CM

## 2022-01-11 DIAGNOSIS — G47.00 INSOMNIA, UNSPECIFIED TYPE: ICD-10-CM

## 2022-01-11 DIAGNOSIS — G47.33 OBSTRUCTIVE SLEEP APNEA-HYPOPNEA SYNDROME: Primary | ICD-10-CM

## 2022-01-11 DIAGNOSIS — G47.33 OBSTRUCTIVE SLEEP APNEA-HYPOPNEA SYNDROME: ICD-10-CM

## 2022-01-11 PROCEDURE — 99244 OFF/OP CNSLTJ NEW/EST MOD 40: CPT | Performed by: NURSE PRACTITIONER

## 2022-01-11 PROCEDURE — 95810 POLYSOM 6/> YRS 4/> PARAM: CPT | Performed by: INTERNAL MEDICINE

## 2022-01-11 PROCEDURE — 95810 POLYSOM 6/> YRS 4/> PARAM: CPT

## 2022-01-11 NOTE — PROGRESS NOTES
Consultation - [address] [place] [address], 1965, MRN: [id number]    1/11/2022        Reason for Consult / Principal Problem:  Evaluation of possible Obstructive Sleep Apnea  Excessive daytime sleepiness  Insomnia - sleep initiation       Thank you for the opportunity of participating in the evaluation and care of this patient in the Sleep Clinic at [facility]  Subjective:     HPI: Anibal Sauer is a 55y o  year old, Urdu-speaking female  [employer]  #[id number]  used for the visit  She presents for a consultation regarding loud snoring and daytime sleepiness  She also wakes up suddenly feeling out of breath, causing her to get out of bed and walk around until she can catch her breath  This has been occurring for several years, but states while it is frightening, she didn't realize it could be dangerous until she discussed it with her PCP  She doesn't always feel refreshed when waking up  There are nights when she goes to bed and doesn't sleep at all  She is not sure why, but she doesn't feel sleepy at all at the time and can't fall asleep  She thinks about things since she is awake, but doesn't feel she is worrying excessively      Comorbid conditions:  Obesity  GERD    Review of Systems      Genitourinary post menopausal (no peroids)   Cardiology palpitations/fluttering feeling in the chest   Gastrointestinal none   Neurology muscle weakness and difficulty with memory   Constitutional fatigue   Integumentary none   Psychiatry none   Musculoskeletal muscle aches   Pulmonary snoring   ENT none   Endocrine none   Hematological none       Sleep Study Results:  No prior sleep study      Employment:  She currently works part-time as a home health aid, working between the hours of 11:00am-8:00pm, typically 4-5 days per week    Sleep Schedule:       Bedtime:  11:00pm      Latency:  1-3 hours      Wakeup time: 7-9:00am    Awakenings:       Frequency:  5-6 times per night      Causes: For urination, feeling cold, hot or itchy      Duration:  She stays awake for awhile, having difficulty returning to sleep at times    Daytime Sleepiness / Inappropriate Sleep:       Most severe:  She feels tired between 11:00am-noon       Naps :  She does not take naps      Inappropriate drowsiness / sleep:  She feels drowsy at work and may doze off for a few minutes  Snoring:  She snores loudly with choking and gasping, with a feeling of suffocating    Apnea: No witnessed apnea    Change in Weight:  no    Restless Leg Syndrome:  no clinical symptoms consistent with this diagnosis     Other Complaints:  No reports of sleep walking, sleep talking or hallucinations surrounding sleep  She has experienced sleep paralysis many times  She noticed this began when she was about 40years old  Episodes of sleep paralysis occur less than once per month  She denies hallucinations surrounding sleep  She does not awaken with headaches  No reports of bruxism  Social History:      Caffeine:  none       Tobacco:   reports that she has never smoked  She has never used smokeless tobacco      E-cig/Vaping:    E-Cigarette/Vaping    E-Cigarette Use Never User       E-Cigarette/Vaping Substances    Nicotine No     THC No     CBD No     Flavoring No     Other No     Unknown No          Alcohol:   reports previous alcohol use  None currently      Drugs:   reports no history of drug use  The review of systems and following portions of the patient's history were reviewed and updated as appropriate: allergies, current medications, past family history, past medical history, past social history, past surgical history and problem list         Objective:       Vitals:    01/11/22 1200   BP: 116/70   Pulse: 66   SpO2: 98%   Weight: 72 1 kg (159 lb)   Height: 5' (1 524 m)     Body mass index is 31 05 kg/m²    Neck Circumference: 16 5  Grover Sleepiness Scale:  Total score: 0      Current Outpatient Medications:     Ascorbic Acid (vitamin C) 1000 MG tablet, Take 1 tablet (1,000 mg total) by mouth every 12 (twelve) hours, Disp: 28 tablet, Rfl: 0    calcium carbonate-vitamin D (OSCAL-D) 500 mg-200 units per tablet, Take 1 tablet by mouth daily with breakfast, Disp: 30 tablet, Rfl: 2    cetirizine (ZyrTEC) 10 mg tablet, Take 1 tablet (10 mg total) by mouth daily, Disp: 30 tablet, Rfl: 1    cholecalciferol (VITAMIN D3) 1,000 units tablet, Take 2 tablets (2,000 Units total) by mouth daily, Disp: 28 tablet, Rfl: 0    fluticasone (FLONASE) 50 mcg/act nasal spray, 1 spray into each nostril daily, Disp: 11 1 mL, Rfl: 1    lidocaine (LIDODERM) 5 %, Apply 1 patch topically daily Remove & Discard patch within 12 hours or as directed by MD, Disp: 30 patch, Rfl: 0    methocarbamol (ROBAXIN) 500 mg tablet, Take 1 tablet (500 mg total) by mouth daily at bedtime as needed for muscle spasms, Disp: 30 tablet, Rfl: 0    Multiple Vitamin (multivitamin) tablet, Take 1 tablet by mouth daily, Disp: 14 tablet, Rfl: 0    naproxen (NAPROSYN) 500 mg tablet, Take 1 tablet (500 mg total) by mouth 2 (two) times a day with meals, Disp: 60 tablet, Rfl: 0    omeprazole (PriLOSEC) 20 mg delayed release capsule, Take 1 capsule (20 mg total) by mouth daily, Disp: 90 capsule, Rfl: 1    Physical Exam  General Appearance:   Alert, cooperative, no distress, appears stated age, mildly obese     Head:   Normocephalic, without obvious abnormality, atraumatic     Eyes:   PERRL, conjunctiva/corneas clear          Nose:  Nares normal, septum midline, mucosa normal, no drainage or sinus tenderness           Throat:  Lips, teeth and gums normal; tongue normal in size and shape and midline in position; mucosa moist with low-lying soft palatal tissue, uvula normal and retracts with phonation, tonsils not visualized, Mallampati class 3       Neck:  Supple, symmetrical, trachea midline, no adenopathy; no thyromegaly noted, no carotid bruit or JVD     Lungs:      Clear to auscultation bilaterally, respirations unlabored     Heart:   Regular rate and rhythm, S1 and S2 normal, no murmur, rub or gallop       Extremities:  Extremities normal, atraumatic, no cyanosis or edema       Skin:  Skin color, texture, turgor normal, no rashes or lesions       Neurologic:  No focal deficits noted  ASSESSMENT / PLAN     1  Obstructive sleep apnea-hypopnea syndrome  Diagnostic Sleep Study    CPAP Study   2  Snoring  Ambulatory referral to Sleep Medicine    Diagnostic Sleep Study    CPAP Study   3  Insomnia, unspecified type  Diagnostic Sleep Study    CPAP Study    sleep initiation and sleep maintenance insomnia         Counseling / Coordination of Care  Total clinic time spent today 60 minutes  Greater than 50% of total time was spent with the patient and / or family counseling and / or coordination of care  A description of the counseling / coordination of care:     diagnostic results, instructions for management, risk factor reductions, prognosis, patient and family education, impressions, risks and benefits of treatment options and importance of compliance with treatment    Today we discussed the anatomy and physiology of the upper airway  I pointed out how changes in this region can result in both snoring and abnormal breathing events including apneas and hypopneas  I explained the most common co-morbidities of untreated sleep apnea  After this we talked about some forms of treatment including application of positive airway pressure, mandibular advancement devices and surgery  In order to evaluate the possibility of Obstructive Sleep Apnea as a cause of the patient's symptoms, a diagnostic sleep study will be completed to identify the presence or absence of abnormal nocturnal breathing   If significant abnormal nocturnal breathing is detected, nasal CPAP will be titrated to find the optimum pressure needed to maintain upper airway patency during sleep  Following testing, the patient will return to the Sleep [address] [place] [address] for set up of CPAP equipment, followed by a compliance follow up visit 31-91 days after the set up  The study details will be reviewed in detail at that time  The following instructions have been given to the patient today:    Patient Instructions   1  Schedule diagnostic sleep study with CPAP titration study to follow, if needed  2  Schedule DME appointment for CPAP equipment, if needed  3  Schedule follow up visit for CPAP compliance and recheck        Nursing Support:  When: Monday through Friday 7A-5PM except holidays  Where: Our direct line is [phone]  If you are having a true emergency please call 911  In the event that the line is busy or it is after hours please leave a voice message and we will return your call  Please speak clearly, leaving your full name, birth date, best number to reach you and the reason for your call  Medication refills: We will need the name of the medication, the dosage, the ordering provider, whether you get a 30 or 90 day refill, and the pharmacy name and address  Medications will be ordered by the provider only  Nurses cannot call in prescriptions  Please allow 7 days for medication refills  Physician requested updates: If your provider requested that you call with an update after starting medication, please be ready to provide us the medication and dosage, what time you take your medication, the time you attempt to fall asleep, time you fall asleep, when you wake up, and what time you get out of bed  Sleep Study Results: We will contact you with sleep study results and/or next steps after the physician has reviewed your testing          Emani Arriaza, [address] [facility]

## 2022-01-11 NOTE — PATIENT INSTRUCTIONS
1  Schedule diagnostic sleep study with CPAP titration study to follow, if needed  2  Schedule DME appointment for CPAP equipment, if needed  3  Schedule follow up visit for CPAP compliance and recheck        Nursing Support:  When: Monday through Friday 7A-5PM except holidays  Where: Our direct line is [phone]  If you are having a true emergency please call 911  In the event that the line is busy or it is after hours please leave a voice message and we will return your call  Please speak clearly, leaving your full name, birth date, best number to reach you and the reason for your call  Medication refills: We will need the name of the medication, the dosage, the ordering provider, whether you get a 30 or 90 day refill, and the pharmacy name and address  Medications will be ordered by the provider only  Nurses cannot call in prescriptions  Please allow 7 days for medication refills  Physician requested updates: If your provider requested that you call with an update after starting medication, please be ready to provide us the medication and dosage, what time you take your medication, the time you attempt to fall asleep, time you fall asleep, when you wake up, and what time you get out of bed  Sleep Study Results: We will contact you with sleep study results and/or next steps after the physician has reviewed your testing

## 2022-01-12 NOTE — PROGRESS NOTES
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 12PM   Caffeine use:YES- coffee  6 ounces and chocolate milk  8 ounces    Alcohol:Dayshift workers after 5PM: Alcohol use:NO    Typical day for patient:YES       Study Documentation    Sleep Study Indications: nocturnal choking, witnessed gasping and daytime sleepiness    Sleep Study: Diagnostic   Snore:Mild  Supplemental O2: no    Minimum SaO2 93  Baseline SaO2 95          EKG abnormalities: no     EEG abnormalities: no    Sleep Study Recorded < 2 hours: N/A    Sleep Study Recorded > 2 hours but incomplete study: N/A    Sleep Study Recorded 6 hours but no sleep obtained: NO        Post-Sleep Study    Medication used at bedtime or during sleep study:NO    Patient reports time it took to fall asleep:less than 20 minutes    Patient reports waking up during study:Denied    Patient reports sleeping 4 to 6 hours without dreaming  Patient reports sleep during study:typical    Patient rated sleepiness: Somewhat sleepy or tired    PAP treatment:no

## 2022-01-14 ENCOUNTER — TELEPHONE (OUTPATIENT)
Dept: SLEEP CENTER | Facility: CLINIC | Age: 57
End: 2022-01-14

## 2022-01-14 DIAGNOSIS — E66.9 OBESITY (BMI 35.0-39.9 WITHOUT COMORBIDITY): ICD-10-CM

## 2022-01-14 DIAGNOSIS — G47.00 INSOMNIA, UNSPECIFIED TYPE: ICD-10-CM

## 2022-01-14 DIAGNOSIS — R73.03 PRE-DIABETES: ICD-10-CM

## 2022-01-14 DIAGNOSIS — G47.33 OBSTRUCTIVE SLEEP APNEA-HYPOPNEA SYNDROME: Primary | ICD-10-CM

## 2022-01-14 NOTE — TELEPHONE ENCOUNTER
Called patient and reviewed sleep study results and advised APAP ordered  Advised CPAP study scheduled 3/18/21 has been cancelled  Rescheduled DME set up 1/28/22  Appointment information emailed to [facility]  Has compliance 6/13/22 that needs to be scheduled sooner but no available appointments    Added to wait list

## 2022-01-14 NOTE — TELEPHONE ENCOUNTER
----- Message from Rohan Saleem, [address]  sent at 1/14/2022  7:58 AM EST -----  Diagnostic sleep study indicates mild CHRISTELLE that becomes much more severe during REM sleep  Plan to schedule set up of APAP, followed by compliance follow up 31-91 days after set up

## 2022-01-17 ENCOUNTER — EVALUATION (OUTPATIENT)
Dept: PHYSICAL THERAPY | Facility: CLINIC | Age: 57
End: 2022-01-17
Payer: COMMERCIAL

## 2022-01-17 VITALS — SYSTOLIC BLOOD PRESSURE: 130 MMHG | HEART RATE: 66 BPM | TEMPERATURE: 98.6 F | DIASTOLIC BLOOD PRESSURE: 84 MMHG

## 2022-01-17 DIAGNOSIS — M47.816 LUMBAR SPONDYLOSIS: ICD-10-CM

## 2022-01-17 DIAGNOSIS — M47.812 CERVICAL SPONDYLOSIS: ICD-10-CM

## 2022-01-17 DIAGNOSIS — G89.29 CHRONIC BILATERAL LOW BACK PAIN WITHOUT SCIATICA: ICD-10-CM

## 2022-01-17 DIAGNOSIS — G89.29 CHRONIC NECK PAIN: ICD-10-CM

## 2022-01-17 DIAGNOSIS — M54.50 CHRONIC BILATERAL LOW BACK PAIN WITHOUT SCIATICA: ICD-10-CM

## 2022-01-17 DIAGNOSIS — M54.2 CHRONIC NECK PAIN: ICD-10-CM

## 2022-01-17 PROCEDURE — 97162 PT EVAL MOD COMPLEX 30 MIN: CPT | Performed by: PHYSICAL THERAPIST

## 2022-01-17 PROCEDURE — 97112 NEUROMUSCULAR REEDUCATION: CPT | Performed by: PHYSICAL THERAPIST

## 2022-01-17 NOTE — PROGRESS NOTES
PT Evaluation     Today's date: 2022  Patient name: Harvey Retana  : 1965  MRN: [id number]  Referring provider: Louisa Hollingsworth MD  Dx:   Encounter Diagnosis     ICD-10-CM    1  Chronic neck pain  M54 2 Ambulatory referral to [facility]    G89 29    2  Chronic bilateral low back pain without sciatica  M54 50 Ambulatory referral to [facility]    G89 29    3  Lumbar spondylosis  M47 816 Ambulatory referral to [facility]   4  Cervical spondylosis  M47 812 Ambulatory referral to [facility]                  Assessment  Assessment details: Pt is a 55y o  year old female presenting to physical therapy for Chronic neck pain, Chronic bilateral low back pain without sciatica, Lumbar spondylosis, and Cervical spondylosis  She presents via [facility] and is moderate risk based on Start Back Assessment Tool  She presents today with the following impairments: ROM limitations, LE weakness, hypomobility in T/S, as well a TTP along cervical musculature affecting her function with cooking, cleaning, laundry, working, lifting, bending, and sitting or standing long periods with back unsupported  Pt will benefit from skilled physical therapy to address functional limitations noted in evaluation and meet patient goals  Impairments: abnormal muscle firing, abnormal or restricted ROM, abnormal movement, activity intolerance, impaired physical strength, lacks appropriate home exercise program, pain with function and poor body mechanics    Symptom irritability: moderate  Goals  ST  Pt will have 5/10 or less at rest   2  Pt will demonstrate good TA activation  LT  Pt will improve lumbar flexion AROM to nil deficits, and lumbar SB to min deficits for improved bending ability  2  Pt will increase b/l hip flexion strength to 4/5 for improved squatting ability    3  Pt will be I w HEP    Plan  Patient would benefit from: PT eval and skilled physical therapy  Planned modality interventions: biofeedback, electrical stimulation/Russian stimulation, TENS and thermotherapy: hydrocollator packs  Planned therapy interventions: abdominal trunk stabilization, joint mobilization, manual therapy, neuromuscular re-education, patient education, strengthening, stretching, therapeutic activities, therapeutic exercise, flexibility, functional ROM exercises and home exercise program  Frequency: 2x week  Duration in weeks: 4  Treatment plan discussed with: patient        Subjective Evaluation    History of Present Illness  Mechanism of injury: Pt reports back, neck, and shoulder pain that limits her from cooking at the stove, mopping, or doing laundry and she needs to sit down and rest   She reports this pain has been occurring for many years  She denies pain at night, but does take pain medications as needed  She works works as a home attendant  She denies any falls or injuries recently, but reports some numbness and tingling in her arms on occasion  Recurrent probem    Pain  Current pain ratin      Diagnostic Tests  X-ray: normal  Treatments  Current treatment: physical therapy        Objective     Palpation   Left   Tenderness of the levator scapulae, scalenes and upper trapezius  Right   Tenderness of the levator scapulae, scalenes and upper trapezius       Active Range of Motion   Cervical/Thoracic Spine       Cervical    Flexion:  WFL  Extension:  Restriction level: minimal  Left lateral flexion:  with pain Restriction level: minimal  Right lateral flexion:  with pain Restriction level minimal  Left rotation:  WFL  Right rotation:  [facility]    Lumbar   Flexion:  Restriction level: minimal  Extension:  Restriction level: minimal  Left lateral flexion:  Restriction level: moderate  Right lateral flexion:  Restriction level: moderate  Left rotation:  Restriction level: minimal  Right rotation:  Restriction level: minimal    Joint Play     Hypomobile: T1, T2, T3, T4, T5, T6, T7, T8, T9 and T10     Pain: T5, T6, T7, T8, T9 and T10     Strength/Myotome Testing     Left Shoulder     Planes of Motion   Abduction: 4     Isolated Muscles   Upper trapezius: 5     Right Shoulder     Planes of Motion   Abduction: 4     Isolated Muscles   Upper trapezius: 5     Left Elbow   Flexion: 4+  Extension: 4+    Right Elbow   Flexion: 4+  Extension: 4+    Left Wrist/Hand   Wrist extension: 4+  Wrist flexion: 4+  Thumb extension: 4+    Right Wrist/Hand   Wrist extension: 4+  Wrist flexion: 4+  Thumb extension: 4+    Lumbar   Left   Heel walk: normal  Toe walk: normal    Right   Heel walk: normal  Toe walk: normal    Left Hip   Planes of Motion   Flexion: 3+    Right Hip   Planes of Motion   Flexion: 3+    Left Knee   Flexion: 4  Extension: 4+    Right Knee   Flexion: 4  Extension: 4+    Additional Strength Details  Poor squatting mechanics and limited depth  Muscle Activation   Patient unable to activate left transverse abdominals and right transverse abdominals  Tests   Cervical     Left   Negative Spurling's Test A  Right   Negative Spurling's Test A  Left Shoulder   Negative ULTT1  Right Shoulder   Negative ULTT1  Lumbar     Left   Negative passive SLR and slump test      Right   Negative passive SLR and slump test               Precautions:  Mod Risk    Date 1/17            Visit # 1            FOTO 36/56             Re-eval IE              Manuals 1/17            Cervical PROM             UT/LS str                                       Neuro Re-Ed 1/17            Scap Retraction 3x10"            No Money             Pushup Plus             TA Bracing 3x10"            Supine Marches 10x            Bridges 10x                                      Ther Ex 1/17            Bike             SB Rolling             Hamstring/ITB str             Rows/Ext 2x10 GTB            Pec Stretch                                                    Ther Activity Gait Training                                       Modalities

## 2022-01-24 ENCOUNTER — OFFICE VISIT (OUTPATIENT)
Dept: PHYSICAL THERAPY | Facility: CLINIC | Age: 57
End: 2022-01-24
Payer: COMMERCIAL

## 2022-01-24 DIAGNOSIS — G89.29 CHRONIC BILATERAL LOW BACK PAIN WITHOUT SCIATICA: ICD-10-CM

## 2022-01-24 DIAGNOSIS — M54.50 CHRONIC BILATERAL LOW BACK PAIN WITHOUT SCIATICA: ICD-10-CM

## 2022-01-24 DIAGNOSIS — G89.29 CHRONIC NECK PAIN: Primary | ICD-10-CM

## 2022-01-24 DIAGNOSIS — M54.2 CHRONIC NECK PAIN: Primary | ICD-10-CM

## 2022-01-24 DIAGNOSIS — M47.812 CERVICAL SPONDYLOSIS: ICD-10-CM

## 2022-01-24 DIAGNOSIS — M47.816 LUMBAR SPONDYLOSIS: ICD-10-CM

## 2022-01-24 PROCEDURE — 97140 MANUAL THERAPY 1/> REGIONS: CPT | Performed by: PHYSICAL THERAPIST

## 2022-01-24 PROCEDURE — 97112 NEUROMUSCULAR REEDUCATION: CPT | Performed by: PHYSICAL THERAPIST

## 2022-01-24 PROCEDURE — 97110 THERAPEUTIC EXERCISES: CPT | Performed by: PHYSICAL THERAPIST

## 2022-01-24 NOTE — PROGRESS NOTES
Daily Note     Today's date: 2022  Patient name: Nathaly Miller  : 1965  MRN: [id number]  Referring provider: Agapito Severino MD  Dx:   Encounter Diagnosis     ICD-10-CM    1  Chronic neck pain  M54 2     G89 29    2  Chronic bilateral low back pain without sciatica  M54 50     G89 29    3  Lumbar spondylosis  M47 816    4  Cervical spondylosis  M47 812                   Subjective: Pt reports some pain in her neck and arms, that worsens with repetitive UE use  Objective: See treatment diary below      Assessment: Pt does well w rows/ext and trial of no money, but has some L shoulder pain w trial of pec stretch in doorway  She does well w addition of SB rolling, hamstring stretch, and demonstrates good TA activation, but is challenged w bridges  She has L UT and LS TTP w stretching and STM  Patient demonstrated fatigue post treatment and would benefit from continued PT  Plan: Continue per plan of care  Progress treatment as tolerated  Precautions:  Mod Risk    Date            Visit # 1 2           FOTO 36/56             Re-eval IE              Manuals            Cervical PROM  SF           UT/LS str  SF w STM           PA Mobs  SF Gr III                        Neuro Re-Ed            Scap Retraction 3x10" 5x10"           No Money  2x15 GTB           Pushup Plus  15x           TA Bracing 3x10" 5x10"           Supine Marches 10x 20x           Bridges 10x 15x                                     Ther Ex            Bike  5'           SB Rolling  3-way 5x5"            Hamstring/ITB str  3x20" seated           Rows/Ext 2x10 GTB 20x ea BTB           Pec Stretch  3x20" doorway                                                  Ther Activity                                       Gait Training                                       Modalities

## 2022-01-25 ENCOUNTER — OFFICE VISIT (OUTPATIENT)
Dept: PHYSICAL THERAPY | Facility: CLINIC | Age: 57
End: 2022-01-25
Payer: COMMERCIAL

## 2022-01-25 DIAGNOSIS — M47.812 CERVICAL SPONDYLOSIS: ICD-10-CM

## 2022-01-25 DIAGNOSIS — M54.2 CHRONIC NECK PAIN: Primary | ICD-10-CM

## 2022-01-25 DIAGNOSIS — M47.816 LUMBAR SPONDYLOSIS: ICD-10-CM

## 2022-01-25 DIAGNOSIS — G89.29 CHRONIC NECK PAIN: Primary | ICD-10-CM

## 2022-01-25 DIAGNOSIS — M54.50 CHRONIC BILATERAL LOW BACK PAIN WITHOUT SCIATICA: ICD-10-CM

## 2022-01-25 DIAGNOSIS — G89.29 CHRONIC BILATERAL LOW BACK PAIN WITHOUT SCIATICA: ICD-10-CM

## 2022-01-25 PROCEDURE — 97110 THERAPEUTIC EXERCISES: CPT | Performed by: PHYSICAL THERAPIST

## 2022-01-25 PROCEDURE — 97112 NEUROMUSCULAR REEDUCATION: CPT | Performed by: PHYSICAL THERAPIST

## 2022-01-25 PROCEDURE — 97140 MANUAL THERAPY 1/> REGIONS: CPT | Performed by: PHYSICAL THERAPIST

## 2022-01-25 NOTE — PROGRESS NOTES
Daily Note     Today's date: 2022  Patient name: Juan Luis Stack  : 1965  MRN: [id number]  Referring provider: Jeromy Sawyer MD  Dx:   Encounter Diagnosis     ICD-10-CM    1  Chronic neck pain  M54 2     G89 29    2  Chronic bilateral low back pain without sciatica  M54 50     G89 29    3  Lumbar spondylosis  M47 816    4  Cervical spondylosis  M47 812                   Subjective: Pt reports some improvements in pain, but her neck pain is still constant  Objective: See treatment diary below      Assessment: Pt does well w continued progression of scap strengthening activities, but is challenged w squatting and needs multiple verbal cues and demonstration for proper squatting form  She has improved bridging ability and is fatigued following increased reps  Patient demonstrated fatigue post treatment and would benefit from continued PT  Plan: Continue per plan of care  Progress treatment as tolerated  Precautions:  Mod Risk    Date           Visit # 1 2 3          FOTO 36/56             Re-eval IE              Manuals           Cervical PROM  SF SF          UT/LS str  SF w STM SF w STM          PA Mobs  SF Gr III SF Gr III                       Neuro Re-Ed           Scap Retraction 3x10" 5x10"           No Money  2x15 GTB 2x15 GTB          Pushup Plus  15x 15x          TA Bracing 3x10" 5x10" 5x10"          Supine Marches 10x 20x 20x          Bridges 10x 15x 20x                                    Ther Ex           Bike  5' 8'          SB Rolling  3-way 5x5"  3-way 5x5"           Hamstring/ITB str  3x20" seated 3x20" seated          Rows/Ext 2x10 GTB 20x ea BTB 20x ea BTB          Pec Stretch  3x20" doorway 3x20" SB                                                 Ther Activity             Squat   20x                       Gait Training                                       Modalities no

## 2022-01-26 ENCOUNTER — APPOINTMENT (OUTPATIENT)
Dept: PHYSICAL THERAPY | Facility: CLINIC | Age: 57
End: 2022-01-26
Payer: COMMERCIAL

## 2022-01-28 ENCOUNTER — TELEPHONE (OUTPATIENT)
Dept: SLEEP CENTER | Facility: CLINIC | Age: 57
End: 2022-01-28

## 2022-01-31 ENCOUNTER — APPOINTMENT (OUTPATIENT)
Dept: PHYSICAL THERAPY | Facility: CLINIC | Age: 57
End: 2022-01-31
Payer: COMMERCIAL

## 2022-02-02 ENCOUNTER — OFFICE VISIT (OUTPATIENT)
Dept: OBGYN CLINIC | Facility: MEDICAL CENTER | Age: 57
End: 2022-02-02
Payer: COMMERCIAL

## 2022-02-02 VITALS
DIASTOLIC BLOOD PRESSURE: 67 MMHG | BODY MASS INDEX: 31.05 KG/M2 | HEART RATE: 74 BPM | TEMPERATURE: 97.2 F | SYSTOLIC BLOOD PRESSURE: 110 MMHG | HEIGHT: 60 IN

## 2022-02-02 DIAGNOSIS — G89.29 CHRONIC NECK PAIN: Primary | ICD-10-CM

## 2022-02-02 DIAGNOSIS — M47.816 LUMBAR SPONDYLOSIS: ICD-10-CM

## 2022-02-02 DIAGNOSIS — M54.50 CHRONIC BILATERAL LOW BACK PAIN WITHOUT SCIATICA: ICD-10-CM

## 2022-02-02 DIAGNOSIS — M47.812 CERVICAL SPONDYLOSIS: ICD-10-CM

## 2022-02-02 DIAGNOSIS — M54.2 CHRONIC NECK PAIN: Primary | ICD-10-CM

## 2022-02-02 DIAGNOSIS — G89.29 CHRONIC BILATERAL LOW BACK PAIN WITHOUT SCIATICA: ICD-10-CM

## 2022-02-02 PROCEDURE — 99214 OFFICE O/P EST MOD 30 MIN: CPT | Performed by: STUDENT IN AN ORGANIZED HEALTH CARE EDUCATION/TRAINING PROGRAM

## 2022-02-02 NOTE — PROGRESS NOTES
1  Chronic neck pain  MRI cervical spine wo contrast    MRI lumbar spine wo contrast    Ambulatory Referral to Pain Management   2  Chronic bilateral low back pain without sciatica  MRI cervical spine wo contrast    MRI lumbar spine wo contrast    Ambulatory Referral to Pain Management   3  Lumbar spondylosis  MRI cervical spine wo contrast    MRI lumbar spine wo contrast    Ambulatory Referral to Pain Management   4  Cervical spondylosis  MRI cervical spine wo contrast    MRI lumbar spine wo contrast    Ambulatory Referral to Pain Management     Orders Placed This Encounter   Procedures    MRI cervical spine wo contrast    MRI lumbar spine wo contrast    Ambulatory Referral to Pain Management        Imaging Studies (I personally reviewed images in PACS and report):     X-ray lumbar spine 08/03/2021: There is transitional lumbosacral vertebra with 6 lumbar type non-rib-bearing vertebrae  No acute osseous abnormalities  Alignment unremarkable  Mild degenerative facet disease at L4-5, L5-6, L6-S1   X-ray cervical spine 08/03/2021:  No acute osseous abnormalities  Straightening of the cervical lordosis may be related to patient positioning or muscle spasm  There is mild degenerative disc disease at C3-4, C4-5, C5-6  IMPRESSION:   Chronic axial cervical neck pain - cervical spondylosis on imaging   Chronic axial midline and paralumbar back pain - lumbar spondylosis   No precipitating injuries and ongoing for years   Reported h/o cervical/lumbar herniated discs per patient    Reports no improvement since last visit    Other factors:   BMI 32    PLAN:     Clinical exam and radiographic imaging reviewed with patient today, with impression as per above  I have discussed with the patient the pathophysiology of this diagnosis and reviewed how the examination correlates with this diagnosis      Given patient reports no relief from conservative treatments started last visit, I have ordered an MRI of her cervical as well as her lumbar spine at this time without contrast for further evaluation  I recommended/referred her to Pain Management afterwards to determine other treatment modalities going forward   Recommended continued formal physical therapy for minimum of 4-6 weeks before transitioning to a home exercise program     Return for follow up with pain mgmt after MRIs   utilized for entirety of office visit including history, physical exam, results review, and patient instructions/treatment plan  CHIEF COMPLAINT:  Chief Complaint   Patient presents with    Lower Back - Follow-up         HPI:  Bulmaro Fajardo is a 55 y o  female  who presents for     Visit 2/2/0222: Follow up lumbar and neck pain:    Patient seen previously on 11/19/2021 in which she was referred to formal PT    She reports today that she has had no improvement of her neck or low back pain since last visit  There are noted 3 sessions of formal PT but patient feels that did not provide relief and in some instances worsened her pain  She is interested in further treatment modalities through neck/lumbar injections if warranted  She denies new injuries since last visit  She describes the pain as sharp/aching and aggravated with any range of motion movements of her neck or low back  She also states it is aggravated with pushing/pulling/lifting  Pain has interfered with her activities of daily living and her ability to sleep at light  She reports some relief with NSAIDs, but patient does not want to take daily  She denies numbness of her upper lower extremities  She denies bowel/bladder incontinence  Of note, previously patient stated she had been managed for this issue back in her [place] [facility] in which she states she had an MRI of her cervical as well as her lumbar spine    She states she was told she has "herniated discs  "        Medical, Surgical, Family, and Social History    History reviewed  No pertinent past medical history  Past Surgical History:   Procedure Laterality Date    BILATERAL OOPHORECTOMY Bilateral      SECTION      x2     Social History   Social History     Substance and Sexual Activity   Alcohol Use Not Currently     Social History     Substance and Sexual Activity   Drug Use Never     Social History     Tobacco Use   Smoking Status Never Smoker   Smokeless Tobacco Never Used     History reviewed  No pertinent family history  No Known Allergies       Physical Exam  /67   Pulse 74   Temp (!) 97 2 °F (36 2 °C)   Ht 5' (1 524 m)   BMI 31 05 kg/m²     Constitutional:  see vital signs  Gen: obese, normocephalic/atraumatic, well-groomed  Eyes: No inflammation or discharge of conjunctiva or lids; sclera clear   Pulmonary/Chest: Effort normal  No respiratory distress         Ortho Exam  Cervical  ROM: intact, but limited; reports paracervical neck pain during neck flexion/extension and lateral flexion b/l  Midline spinous process tenderness: +C5/6  Muscular Tenderness: +b/l paracervical  Sensation UE Bilateral:  C5: normal  C6: normal  C7: normal  C8: normal  T1: normal  Strength UE: 5/5 elbow, wrist, fingers bilateral  Reflexes: 2+ bicipital/tricipital/brachioradilis  Spurlings: negative       BACK EXAM:  Gait: normal, no trendelenberg gait, no antalgic gait    BACK TENDERNESS:  Spinous Processes: +L4/L5  Paraspinal Muscles: +b/l paralumbar  SI Joint: no  Sacrum: no    ROM:  Flexion: 90  Extension: 20  Lateral flexion: 20 b/l  Rotation: 20 b/l    DERMATOMAL SENSATION:  L1: normal   L2: normal   L3: normal   L4: normal   L5: normal   S1: normal    STRENGTH (bilateral):  Knee Extension: 5/5  Knee Flexion: 5/5  Foot Dorsiflexion: 5/5  Great Toe Extension: 5/5  Foot Plantarflexion: 5/5  Hip Flexion: 5/5    REFLEXES:  Patellar: 2+ bilateral  Achilles: 2+ bilateral  Clonus: negative bilateral    BACK:   SUPINE STRAIGHT LEG: negative, but aggravates lumbar pain    HIP:  LOG ROLL: negative  DAYAN: negative  FADIR: negative        Procedures

## 2022-02-17 ENCOUNTER — HOSPITAL ENCOUNTER (OUTPATIENT)
Dept: MRI IMAGING | Facility: HOSPITAL | Age: 57
Discharge: HOME | End: 2022-02-17
Payer: COMMERCIAL

## 2022-02-17 DIAGNOSIS — G89.29 CHRONIC BILATERAL LOW BACK PAIN WITHOUT SCIATICA: ICD-10-CM

## 2022-02-17 DIAGNOSIS — M47.816 LUMBAR SPONDYLOSIS: ICD-10-CM

## 2022-02-17 DIAGNOSIS — G89.29 CHRONIC NECK PAIN: ICD-10-CM

## 2022-02-17 DIAGNOSIS — M54.2 CHRONIC NECK PAIN: ICD-10-CM

## 2022-02-17 DIAGNOSIS — M47.812 CERVICAL SPONDYLOSIS: ICD-10-CM

## 2022-02-17 DIAGNOSIS — M54.50 CHRONIC BILATERAL LOW BACK PAIN WITHOUT SCIATICA: ICD-10-CM

## 2022-02-17 PROCEDURE — G1004 CDSM NDSC: HCPCS

## 2022-02-17 PROCEDURE — 72141 MRI NECK SPINE W/O DYE: CPT

## 2022-02-17 PROCEDURE — 72148 MRI LUMBAR SPINE W/O DYE: CPT

## 2022-02-25 ENCOUNTER — HOSPITAL ENCOUNTER (OUTPATIENT)
Dept: RADIOLOGY | Facility: HOSPITAL | Age: 57
Discharge: HOME | End: 2022-02-25
Attending: INTERNAL MEDICINE
Payer: COMMERCIAL

## 2022-02-25 DIAGNOSIS — K56.609 SBO (SMALL BOWEL OBSTRUCTION) (HCC): ICD-10-CM

## 2022-02-25 PROCEDURE — 74248 X-RAY SM INT F-THRU STD: CPT

## 2022-02-25 PROCEDURE — 74246 X-RAY XM UPR GI TRC 2CNTRST: CPT

## 2022-03-04 ENCOUNTER — CONSULT (OUTPATIENT)
Dept: PAIN MEDICINE | Facility: CLINIC | Age: 57
End: 2022-03-04
Payer: COMMERCIAL

## 2022-03-04 ENCOUNTER — TELEPHONE (OUTPATIENT)
Dept: GASTROENTEROLOGY | Facility: MEDICAL CENTER | Age: 57
End: 2022-03-04

## 2022-03-04 VITALS
SYSTOLIC BLOOD PRESSURE: 110 MMHG | HEIGHT: 60 IN | WEIGHT: 163 LBS | DIASTOLIC BLOOD PRESSURE: 72 MMHG | BODY MASS INDEX: 32 KG/M2

## 2022-03-04 DIAGNOSIS — M47.816 LUMBAR SPONDYLOSIS: ICD-10-CM

## 2022-03-04 DIAGNOSIS — M79.18 MYOFASCIAL PAIN SYNDROME: ICD-10-CM

## 2022-03-04 DIAGNOSIS — M54.50 CHRONIC BILATERAL LOW BACK PAIN WITHOUT SCIATICA: ICD-10-CM

## 2022-03-04 DIAGNOSIS — M54.2 CHRONIC NECK PAIN: ICD-10-CM

## 2022-03-04 DIAGNOSIS — M79.671 FOOT PAIN, RIGHT: Primary | ICD-10-CM

## 2022-03-04 DIAGNOSIS — M47.812 CERVICAL SPONDYLOSIS: ICD-10-CM

## 2022-03-04 DIAGNOSIS — G89.29 CHRONIC BILATERAL LOW BACK PAIN WITHOUT SCIATICA: ICD-10-CM

## 2022-03-04 DIAGNOSIS — G89.29 CHRONIC NECK PAIN: ICD-10-CM

## 2022-03-04 DIAGNOSIS — M51.26 LUMBAR DISC HERNIATION: ICD-10-CM

## 2022-03-04 PROCEDURE — 99244 OFF/OP CNSLTJ NEW/EST MOD 40: CPT | Performed by: PHYSICAL MEDICINE & REHABILITATION

## 2022-03-04 NOTE — PATIENT INSTRUCTIONS
Chronic Pain   WHAT YOU NEED TO KNOW:   Chronic pain is pain that does not get better for 3 months or longer  Chronic pain may hurt all the time, or come and go  DISCHARGE INSTRUCTIONS:   Call your local emergency number or have someone else call (911 in the [address]) if:   · You are breathing slower than normal, or you have trouble breathing  · You cannot be awakened  · You have a seizure  Call your doctor if:   · Your heart feels like it is jumping or fluttering  · You cannot think clearly  · You have side effects from prescription pain medicine, such as itching, nausea, or vomiting  · You have trouble sleeping  · Your pain gets worse, even after you take medicine  · You don't think the medicine is working  · You have questions or concerns about your condition or care  Medicines: You may need any of the following:  · Acetaminophen  decreases pain and fever  It is available without a doctor's order  Ask how much to take and how often to take it  Follow directions  Read the labels of all other medicines you are using to see if they also contain acetaminophen, or ask your doctor or pharmacist  Acetaminophen can cause liver damage if not taken correctly  Do not use more than 4 grams (4,000 milligrams) total of acetaminophen in one day  · NSAIDs , such as ibuprofen, help decrease swelling, pain, and fever  This medicine is available with or without a doctor's order  NSAIDs can cause stomach bleeding or kidney problems in certain people  If you take blood thinner medicine, always ask your healthcare provider if NSAIDs are safe for you  Always read the medicine label and follow directions  · Prescription pain medicine  called narcotics or opioids may be given for certain types of chronic pain  Ask your healthcare provider how to take this medicine safely  · Anesthetics  can be rubbed on your skin or injected into a nerve or muscle to numb an area      · Other medicines  may reduce pain, anxiety, muscle tension, or swelling  · Take your medicine as directed  Contact your healthcare provider if you think your medicine is not helping or if you have side effects  Tell him of her if you are allergic to any medicine  Keep a list of the medicines, vitamins, and herbs you take  Include the amounts, and when and why you take them  Bring the list or the pill bottles to follow-up visits  Carry your medicine list with you in case of an emergency  Manage your chronic pain:   · Apply heat  on the area in pain for 20 to 30 minutes every 2 hours for as many days as directed  Heat helps decrease pain and muscle spasms  · Apply ice  on the part of your body that hurts for 15 to 20 minutes every hour or as directed  Use an ice pack, or put crushed ice in a plastic bag  Cover it with a towel  Ice decreases pain and swelling, and helps prevent tissue damage  · Go to physical therapy as directed  A physical therapist teaches you exercises to help improve movement and strength, and to decrease pain  · Exercise for 30 minutes, 3 times a week  Regular physical activity can help decrease pain and improve your quality of life  Ask your healthcare provider about the best exercise plan for your type of pain  · Get enough sleep  Create a relaxing bedtime routine  Go to sleep and wake up at the same time every day  Avoid caffeine in the afternoon  · Talk with a counselor or therapist   A type of counseling called cognitive behavioral therapy (CBT) can help your chronic pain by changing the way you think about it  CBT can also improve your mood, sleep, and ability to move  What you must know if you take narcotic pain medicine:   · You may need to take a bowel movement softener  The most common side effect of prescription pain medicine is constipation  Bowel movement softeners are available over the counter  · Do not mix prescription pain medicines    This can cause an overdose of medicine, which can become life-threatening  Read labels  Make sure you know the ingredients in all of your medicines  · Do not drink alcohol  when you take prescription pain medicine  It is not safe to mix narcotics or opioids with alcohol or illegal drugs  · Prescription pain medicine may impair your ability to drive or work safely  They may also cause dizziness and increase your risk for falling  · Store prescription pain medicine in a safe location at home  Keep your medicine away from children and other people  Never share your medicine with anyone  Follow up with your healthcare provider as directed: You may be referred to a pain specialist  Write down your questions so you remember to ask them during your visits  © Copyright [employer] 2022 Information is for End User's use only and may not be sold, redistributed or otherwise used for commercial purposes  All illustrations and images included in CareNotes® are the copyrighted property of [employer]  or [employer] Or[employer]   The above information is an  only  It is not intended as medical advice for individual conditions or treatments  Talk to your doctor, nurse or pharmacist before following any medical regimen to see if it is safe and effective for you

## 2022-03-04 NOTE — PROGRESS NOTES
Assessment:  1  Foot pain, right    2  Chronic neck pain    3  Chronic bilateral low back pain without sciatica    4  Lumbar spondylosis    5  Cervical spondylosis    6  Lumbar disc herniation    7  Myofascial pain syndrome        Plan:  Ms Walter John is a pleasant 56-year-old female who presents for initial evaluation regarding chronic neck, bilateral shoulder and low back pain  She was referred by Dr Lorie Fernando regarding his chronic pain despite conservative and medication management approaches including physical therapy, home exercises and naproxen/Robaxin  During today's evaluation she is demonstrating pain that is likely multifactorial nature with clinical evidence of bilateral cervical thoracic and trapezius muscle myofascial pain syndrome as well as low back pain suspecting lumbar radiculopathy with a notable disc herniation as identified on the most recent lumbar MRI without contrast   At this time interventional approaches would be beneficial and warranted  As such we will   1  Plan for L4-L5 lumbar epidural steroid injection under fluoro guidance  2  Will also plan for bilateral cervical thoracic paraspinal trigger point injections under ultrasound guidance  We will space out these procedures greater than 14 days apart   3  Complete risks and benefits including bleeding, infection, tissue reaction, nerve injury and allergic reaction were discussed  The approach was demonstrated using models and literature was provided  Verbal and written consent was obtained  History of Present Illness:     Thomas Florez [id number] utilized during entirety of today's visit  Teresa Guerrero is a 55 y o  female who presents to [facility] for initial evaluation of the above stated pain complaints  Patient reports 5 years duration of neck and midback pain  Denies any significant inciting event or recent trauma    Today reports moderate to severe pain rated 8/10 and interfering with daily activities  Describes the pain as nearly constant pressure-like, throbbing, shooting pain that is present throughout the day and night  Also reports weakness in bilateral upper and lower extremities but denies falls  Does not use any durable medical equipment for ambulation  Symptoms are worse with standing, exercise  Has had no significant relief with previous injections, physical therapy, home exercises  Not currently taking anything for pain  Presents today for initial evaluation  Review of Systems:    Review of Systems   Constitutional: Positive for activity change and fatigue  HENT: Positive for sneezing  Eyes: Negative  Respiratory: Positive for shortness of breath and wheezing  Cardiovascular: Positive for palpitations  Gastrointestinal: Negative  Endocrine: Positive for cold intolerance and heat intolerance  Genitourinary: Negative  Musculoskeletal: Positive for back pain  Skin: Negative  Neurological: Positive for weakness and numbness  Patient Active Problem List   Diagnosis    Insomnia    History of BCG vaccination    Partial small bowel obstruction (HCC)    Pre-diabetes    Hyperlipidemia    Allergic rhinitis    Neck pain    Thyroid nodule    Chronic low back pain    Gastroesophageal reflux disease without esophagitis    Snoring    Chronic pain of both shoulders    Transaminitis    Sensation of fullness in left ear    Obstructive sleep apnea-hypopnea syndrome       History reviewed  No pertinent past medical history  Past Surgical History:   Procedure Laterality Date    BILATERAL OOPHORECTOMY Bilateral      SECTION      x2       History reviewed  No pertinent family history      Social History     Occupational History    Not on file   Tobacco Use    Smoking status: Never Smoker    Smokeless tobacco: Never Used   Vaping Use    Vaping Use: Never used   Substance and Sexual Activity    Alcohol use: Not Currently    Drug use: Never    Sexual activity: Not Currently     Comment: not          Current Outpatient Medications:     Ascorbic Acid (vitamin C) 1000 MG tablet, Take 1 tablet (1,000 mg total) by mouth every 12 (twelve) hours, Disp: 28 tablet, Rfl: 0    calcium carbonate-vitamin D (OSCAL-D) 500 mg-200 units per tablet, Take 1 tablet by mouth daily with breakfast, Disp: 30 tablet, Rfl: 2    cetirizine (ZyrTEC) 10 mg tablet, Take 1 tablet (10 mg total) by mouth daily, Disp: 30 tablet, Rfl: 1    cholecalciferol (VITAMIN D3) 1,000 units tablet, Take 2 tablets (2,000 Units total) by mouth daily, Disp: 28 tablet, Rfl: 0    fluticasone (FLONASE) 50 mcg/act nasal spray, 1 spray into each nostril daily, Disp: 11 1 mL, Rfl: 1    lidocaine (LIDODERM) 5 %, Apply 1 patch topically daily Remove & Discard patch within 12 hours or as directed by MD, Disp: 30 patch, Rfl: 0    methocarbamol (ROBAXIN) 500 mg tablet, Take 1 tablet (500 mg total) by mouth daily at bedtime as needed for muscle spasms, Disp: 30 tablet, Rfl: 0    Multiple Vitamin (multivitamin) tablet, Take 1 tablet by mouth daily, Disp: 14 tablet, Rfl: 0    naproxen (NAPROSYN) 500 mg tablet, Take 1 tablet (500 mg total) by mouth 2 (two) times a day with meals, Disp: 60 tablet, Rfl: 0    omeprazole (PriLOSEC) 20 mg delayed release capsule, Take 1 capsule (20 mg total) by mouth daily, Disp: 90 capsule, Rfl: 1    No Known Allergies    Physical Exam:    /72   Ht 5' (1 524 m)   Wt 73 9 kg (163 lb)   BMI 31 83 kg/m²     Constitutional: normal, well developed, well nourished, alert, in no distress and non-toxic and no overt pain behavior    Eyes: anicteric  HEENT: grossly intact  Neck: supple, symmetric, trachea midline and no masses   Pulmonary:even and unlabored  Cardiovascular:No edema or pitting edema present  Skin:Normal without rashes or lesions and well hydrated  Psychiatric:Mood and affect appropriate  Neurologic:Cranial Nerves II-XII grossly intact  Musculoskeletal:antalgic, tenderness to palpation bilateral cervical thoracic paraspinals and lumbar paraspinals as well as bilateral traps, decreased active and passive range of motion with lumbar flexion and extension limited by pain, MMT 5/5 bilateral lower extremities, sensation grossly intact to light touch, DTRs within normal limits, positive straight leg raise in the supine position with radicular pain into the right leg    Imaging  FL spine and pain procedure    (Results Pending)   US guidance    (Results Pending)       Orders Placed This Encounter   Procedures    FL spine and pain procedure    US guidance    Ambulatory Referral to Podiatry

## 2022-03-07 ENCOUNTER — HOSPITAL ENCOUNTER (EMERGENCY)
Facility: HOSPITAL | Age: 57
Discharge: HOME | End: 2022-03-07
Attending: EMERGENCY MEDICINE
Payer: COMMERCIAL

## 2022-03-07 VITALS
HEART RATE: 72 BPM | RESPIRATION RATE: 16 BRPM | TEMPERATURE: 99.2 F | WEIGHT: 161.2 LBS | DIASTOLIC BLOOD PRESSURE: 73 MMHG | SYSTOLIC BLOOD PRESSURE: 131 MMHG | OXYGEN SATURATION: 98 % | BODY MASS INDEX: 31.48 KG/M2

## 2022-03-07 DIAGNOSIS — S93.491A SPRAIN OF ANTERIOR TALOFIBULAR LIGAMENT OF RIGHT ANKLE, INITIAL ENCOUNTER: Primary | ICD-10-CM

## 2022-03-07 PROCEDURE — 99284 EMERGENCY DEPT VISIT MOD MDM: CPT | Performed by: EMERGENCY MEDICINE

## 2022-03-07 PROCEDURE — 99283 EMERGENCY DEPT VISIT LOW MDM: CPT

## 2022-03-07 RX ORDER — NAPROXEN 500 MG/1
500 TABLET ORAL 2 TIMES DAILY WITH MEALS
Qty: 30 TABLET | Refills: 0 | Status: SHIPPED | OUTPATIENT
Start: 2022-03-07

## 2022-03-08 NOTE — ED PROVIDER NOTES
History  Chief Complaint   Patient presents with    Foot Pain     Right foot pain for 3 to 4 weeks, denies any trauma, taking Advil occasionally for pain, nothing in the last 24 hours  60-year-old female presents emergency with atraumatic right foot pain  Patient points to the right a TLC in the pain has been going on for 3-4 weeks, does not recall any trauma  Notes pain worse when she is squatting to lift something up  Otherwise does not note pain on walking  No numbness tingling  History provided by:  Patient  Ankle Pain  Location:  Ankle  Time since incident:  3 weeks  Injury: no    Ankle location:  R ankle  Pain details:     Quality:  Aching    Radiates to:  Does not radiate    Severity:  Mild    Onset quality:  Gradual    Duration:  2 weeks    Timing:  Intermittent    Progression:  Waxing and waning  Chronicity:  New  Prior injury to area:  No  Relieved by:  Nothing  Worsened by:  Nothing  Ineffective treatments:  None tried  Associated symptoms: no back pain and no fever        Prior to Admission Medications   Prescriptions Last Dose Informant Patient Reported? Taking?    Ascorbic Acid (vitamin C) 1000 MG tablet   No No   Sig: Take 1 tablet (1,000 mg total) by mouth every 12 (twelve) hours   Multiple Vitamin (multivitamin) tablet   No No   Sig: Take 1 tablet by mouth daily   calcium carbonate-vitamin D (OSCAL-D) 500 mg-200 units per tablet   No No   Sig: Take 1 tablet by mouth daily with breakfast   cetirizine (ZyrTEC) 10 mg tablet   No No   Sig: Take 1 tablet (10 mg total) by mouth daily   cholecalciferol (VITAMIN D3) 1,000 units tablet   No No   Sig: Take 2 tablets (2,000 Units total) by mouth daily   fluticasone (FLONASE) 50 mcg/act nasal spray   No No   Si spray into each nostril daily   lidocaine (LIDODERM) 5 %   No No   Sig: Apply 1 patch topically daily Remove & Discard patch within 12 hours or as directed by MD   methocarbamol (ROBAXIN) 500 mg tablet   No No   Sig: Take 1 tablet (500 mg total) by mouth daily at bedtime as needed for muscle spasms   naproxen (NAPROSYN) 500 mg tablet   No No   Sig: Take 1 tablet (500 mg total) by mouth 2 (two) times a day with meals   omeprazole (PriLOSEC) 20 mg delayed release capsule   No No   Sig: Take 1 capsule (20 mg total) by mouth daily      Facility-Administered Medications: None       History reviewed  No pertinent past medical history  Past Surgical History:   Procedure Laterality Date    BILATERAL OOPHORECTOMY Bilateral      SECTION      x2       History reviewed  No pertinent family history  I have reviewed and agree with the history as documented  E-Cigarette/Vaping    E-Cigarette Use Never User      E-Cigarette/Vaping Substances    Nicotine No     THC No     CBD No     Flavoring No     Other No     Unknown No      Social History     Tobacco Use    Smoking status: Never Smoker    Smokeless tobacco: Never Used   Vaping Use    Vaping Use: Never used   Substance Use Topics    Alcohol use: Not Currently    Drug use: Never       Review of Systems   Constitutional: Negative for chills and fever  HENT: Negative for ear pain and sore throat  Eyes: Negative for pain and visual disturbance  Respiratory: Negative for cough and shortness of breath  Cardiovascular: Negative for chest pain and palpitations  Gastrointestinal: Negative for abdominal pain and vomiting  Genitourinary: Negative for dysuria and hematuria  Musculoskeletal: Positive for arthralgias  Negative for back pain  Skin: Negative for color change and rash  Neurological: Negative for seizures and syncope  All other systems reviewed and are negative  Physical Exam  Physical Exam  Vitals and nursing note reviewed  Constitutional:       General: She is not in acute distress  Appearance: She is well-developed  HENT:      Head: Normocephalic and atraumatic  Nose: Nose normal  No congestion        Mouth/Throat:      Mouth: Mucous membranes are moist    Eyes:      Conjunctiva/sclera: Conjunctivae normal    Cardiovascular:      Rate and Rhythm: Normal rate and regular rhythm  Heart sounds: No murmur heard  Pulmonary:      Effort: Pulmonary effort is normal  No respiratory distress  Breath sounds: Normal breath sounds  Abdominal:      Palpations: Abdomen is soft  Tenderness: There is no abdominal tenderness  Musculoskeletal:      Cervical back: Neck supple  Comments: Right ankle with no edema, full range of motion  Tenderness to palpation at the anterior talofibular ligament  No medial or lateral malleolus tenderness  No tenderness at the 5th metatarsal base   Skin:     General: Skin is warm and dry  Capillary Refill: Capillary refill takes less than 2 seconds  Neurological:      Mental Status: She is alert and oriented to person, place, and time  Vital Signs  ED Triage Vitals [03/07/22 1850]   Temperature Pulse Respirations Blood Pressure SpO2   99 2 °F (37 3 °C) 72 16 131/73 98 %      Temp Source Heart Rate Source Patient Position - Orthostatic VS BP Location FiO2 (%)   Oral Monitor Sitting Left arm --      Pain Score       8           Vitals:    03/07/22 1850   BP: 131/73   Pulse: 72   Patient Position - Orthostatic VS: Sitting         Visual Acuity      ED Medications  Medications - No data to display    Diagnostic Studies  Results Reviewed     None                 No orders to display              Procedures  Procedures         ED Course                               SBIRT 20yo+      Most Recent Value   SBIRT (22 yo +)    In order to provide better care to our patients, we are screening all of our patients for alcohol and drug use  Would it be okay to ask you these screening questions? Yes Filed at: 03/07/2022 1854   Initial Alcohol Screen: US AUDIT-C     1  How often do you have a drink containing alcohol? 0 Filed at: 03/07/2022 1854   2   How many drinks containing alcohol do you have on a typical day you are drinking? 0 Filed at: 03/07/2022 1854   3b  FEMALE Any Age, or MALE 65+: How often do you have 4 or more drinks on one occassion? 0 Filed at: 03/07/2022 1854   Audit-C Score 0 Filed at: 03/07/2022 1854   AVANI: How many times in the past year have you    Used an illegal drug or used a prescription medication for non-medical reasons? Never Filed at: 03/07/2022 1854                    [facility]  Number of Diagnoses or Management Options  Sprain of anterior talofibular ligament of right ankle, initial encounter  Diagnosis management comments: Likely ats pain, will place in ankle stirrup splint, orthopedics follow-up  Disposition  Final diagnoses:   Sprain of anterior talofibular ligament of right ankle, initial encounter     Time reflects when diagnosis was documented in both MDM as applicable and the Disposition within this note     Time User Action Codes Description Comment    3/7/2022  7:06 PM Dima Marion [C12 [id number]] Sprain of anterior talofibular ligament of right ankle, initial encounter       ED Disposition     ED Disposition Condition Date/Time Comment    Discharge Stable Mon Mar 7, 2022  7:06 PM BARTOLO LOCKWOOD  [facility] discharge to home/self care              Follow-up Information     Follow up With Specialties Details Why Contact Info Additional Information    [facility]   [address] [address] [id number]  [address] [place] [address] Specialists [facility], [address] [place] [address], [address], [id number] [phone]          Patient's Medications   Discharge Prescriptions    NAPROXEN (NAPROSYN) 500 MG TABLET    Take 1 tablet (500 mg total) by mouth 2 (two) times a day with meals       Start Date: 3/7/2022  End Date: --       Order Dose: 500 mg       Quantity: 30 tablet    Refills: 0       Outpatient Discharge Orders   Splint       PDMP Review     None          ED Provider  Electronically Signed by           Jonelle Evans MD  03/07/22 3718

## 2022-03-21 ENCOUNTER — OFFICE VISIT (OUTPATIENT)
Dept: PODIATRY | Facility: CLINIC | Age: 57
End: 2022-03-21
Payer: COMMERCIAL

## 2022-03-21 VITALS
WEIGHT: 159 LBS | DIASTOLIC BLOOD PRESSURE: 78 MMHG | BODY MASS INDEX: 31.22 KG/M2 | SYSTOLIC BLOOD PRESSURE: 112 MMHG | HEIGHT: 60 IN

## 2022-03-21 DIAGNOSIS — M67.471 GANGLION CYST OF RIGHT FOOT: Primary | ICD-10-CM

## 2022-03-21 DIAGNOSIS — M79.671 FOOT PAIN, RIGHT: ICD-10-CM

## 2022-03-21 DIAGNOSIS — M19.071 OSTEOARTHRITIS OF RIGHT FOOT, UNSPECIFIED OSTEOARTHRITIS TYPE: ICD-10-CM

## 2022-03-21 PROCEDURE — 99203 OFFICE O/P NEW LOW 30 MIN: CPT

## 2022-03-21 PROCEDURE — 20600 DRAIN/INJ JOINT/BURSA W/O US: CPT

## 2022-03-21 RX ORDER — TRIAMCINOLONE ACETONIDE 40 MG/ML
40 INJECTION, SUSPENSION INTRA-ARTICULAR; INTRAMUSCULAR ONCE
Status: COMPLETED | OUTPATIENT
Start: 2022-03-21 | End: 2022-03-21

## 2022-03-21 RX ORDER — LIDOCAINE HYDROCHLORIDE 10 MG/ML
1 INJECTION, SOLUTION INFILTRATION; PERINEURAL ONCE
Status: COMPLETED | OUTPATIENT
Start: 2022-03-21 | End: 2022-03-21

## 2022-03-21 RX ADMIN — LIDOCAINE HYDROCHLORIDE 1 ML: 10 INJECTION, SOLUTION INFILTRATION; PERINEURAL at 14:30

## 2022-03-21 RX ADMIN — TRIAMCINOLONE ACETONIDE 40 MG: 40 INJECTION, SUSPENSION INTRA-ARTICULAR; INTRAMUSCULAR at 14:30

## 2022-03-21 NOTE — PROGRESS NOTES
Assessment/Plan:         Diagnoses and all orders for this visit:    Ganglion cyst of right foot  -     triamcinolone acetonide (KENALOG-40) 40 mg/mL injection 40 mg  -     lidocaine (XYLOCAINE) 1 % injection 1 mL    Foot pain, right  -     Ambulatory Referral to Podiatry  -     triamcinolone acetonide (KENALOG-40) 40 mg/mL injection 40 mg  -     lidocaine (XYLOCAINE) 1 % injection 1 mL    Osteoarthritis of right foot, unspecified osteoarthritis type  -     triamcinolone acetonide (KENALOG-40) 40 mg/mL injection 40 mg  -     lidocaine (XYLOCAINE) 1 % injection 1 mL      Diagnosis and options discussed with patient  Patient agreeable to the plan as stated below    Small cyst at TMTJ with pain  See injection below  RICE protocol discussed  Reappoint if pain returns          Foot injection     Date/Time 3/21/2022 2:25 PM     Performed by  Nika Guy DPM     Authorized by Nika Guy DPM      Universal Protocol   Consent: Verbal consent obtained  Risks and benefits: risks, benefits and alternatives were discussed  Consent given by: patient  Timeout called at: 3/21/2022 2:25 PM   Patient understanding: patient states understanding of the procedure being performed  Patient identity confirmed: verbally with patient        Local anesthesia used: yes     Anesthesia   Local anesthesia used: yes  Local Anesthetic: lidocaine 1% without epinephrine (1cc)  Anesthetic total: 1 mL     Procedure Details   Procedure Notes: 1cc 1% lidocaine plain and 0 5cc kenalog 40 injected into 3rd TMTJ and cyst  Pain improved  Bandaid applied  Skin cleaned with alcohol swab  No complications                   Subjective:      Patient ID: Libby Glez is a 55 y o  female  Patient presents with right foot pain for 3 months  PAin is on top of her foot (points to general TN/NC joints)  Pain came out of nowhere  There is no swelling  It hurts regardless of shoe gear or barefoot  PMH: chronic back pain         The following portions of the patient's history were reviewed and updated as appropriate:   She  has no past medical history on file  She   Patient Active Problem List    Diagnosis Date Noted    Obstructive sleep apnea-hypopnea syndrome 2022    Snoring 2021    Chronic pain of both shoulders 2021    Transaminitis 2021    Sensation of fullness in left ear 2021    Pre-diabetes 2021    Hyperlipidemia 2021    Allergic rhinitis 2021    Neck pain 2021    Thyroid nodule 2021    Chronic low back pain 2021    Gastroesophageal reflux disease without esophagitis 2021    Partial small bowel obstruction ([address] ) 2021    Insomnia 2020    History of BCG vaccination 2020     She  has a past surgical history that includes Bilateral oophorectomy (Bilateral) and  section  Her family history is not on file  She  reports that she has never smoked  She has never used smokeless tobacco  She reports previous alcohol use  She reports that she does not use drugs    Current Outpatient Medications   Medication Sig Dispense Refill    Ascorbic Acid (vitamin C) 1000 MG tablet Take 1 tablet (1,000 mg total) by mouth every 12 (twelve) hours 28 tablet 0    calcium carbonate-vitamin D (OSCAL-D) 500 mg-200 units per tablet Take 1 tablet by mouth daily with breakfast 30 tablet 2    cetirizine (ZyrTEC) 10 mg tablet Take 1 tablet (10 mg total) by mouth daily 30 tablet 1    cholecalciferol (VITAMIN D3) 1,000 units tablet Take 2 tablets (2,000 Units total) by mouth daily 28 tablet 0    fluticasone (FLONASE) 50 mcg/act nasal spray 1 spray into each nostril daily 11 1 mL 1    lidocaine (LIDODERM) 5 % Apply 1 patch topically daily Remove & Discard patch within 12 hours or as directed by MD 30 patch 0    methocarbamol (ROBAXIN) 500 mg tablet Take 1 tablet (500 mg total) by mouth daily at bedtime as needed for muscle spasms 30 tablet 0    Multiple Vitamin (multivitamin) tablet Take 1 tablet by mouth daily 14 tablet 0    naproxen (NAPROSYN) 500 mg tablet Take 1 tablet (500 mg total) by mouth 2 (two) times a day with meals 60 tablet 0    naproxen (Naprosyn) 500 mg tablet Take 1 tablet (500 mg total) by mouth 2 (two) times a day with meals 30 tablet 0    omeprazole (PriLOSEC) 20 mg delayed release capsule Take 1 capsule (20 mg total) by mouth daily 90 capsule 1     Current Facility-Administered Medications   Medication Dose Route Frequency Provider Last Rate Last Admin    lidocaine (XYLOCAINE) 1 % injection 1 mL  1 mL Infiltration Once Tresia Halo, DPM        triamcinolone acetonide (KENALOG-40) 40 mg/mL injection 40 mg  40 mg Intra-lesional Once Tresia Halo, DPM         Current Outpatient Medications on File Prior to Visit   Medication Sig    Ascorbic Acid (vitamin C) 1000 MG tablet Take 1 tablet (1,000 mg total) by mouth every 12 (twelve) hours    calcium carbonate-vitamin D (OSCAL-D) 500 mg-200 units per tablet Take 1 tablet by mouth daily with breakfast    cetirizine (ZyrTEC) 10 mg tablet Take 1 tablet (10 mg total) by mouth daily    cholecalciferol (VITAMIN D3) 1,000 units tablet Take 2 tablets (2,000 Units total) by mouth daily    fluticasone (FLONASE) 50 mcg/act nasal spray 1 spray into each nostril daily    lidocaine (LIDODERM) 5 % Apply 1 patch topically daily Remove & Discard patch within 12 hours or as directed by MD    methocarbamol (ROBAXIN) 500 mg tablet Take 1 tablet (500 mg total) by mouth daily at bedtime as needed for muscle spasms    Multiple Vitamin (multivitamin) tablet Take 1 tablet by mouth daily    naproxen (NAPROSYN) 500 mg tablet Take 1 tablet (500 mg total) by mouth 2 (two) times a day with meals    naproxen (Naprosyn) 500 mg tablet Take 1 tablet (500 mg total) by mouth 2 (two) times a day with meals    omeprazole (PriLOSEC) 20 mg delayed release capsule Take 1 capsule (20 mg total) by mouth daily     No current facility-administered medications on file prior to visit  She has No Known Allergies       Review of Systems   Constitutional: Negative  HENT: Negative for sinus pressure and sinus pain  Respiratory: Negative for shortness of breath  Cardiovascular: Negative for leg swelling  Gastrointestinal: Negative for diarrhea, nausea and vomiting  Musculoskeletal: Positive for arthralgias  Negative for gait problem  Skin: Negative for color change and wound  Neurological: Negative for weakness and numbness  Objective:      /78   Ht 5' (1 524 m) Comment: verbal  Wt 72 1 kg (159 lb)   BMI 31 05 kg/m²          Physical Exam  Vitals reviewed  Constitutional:       Appearance: She is obese  She is not ill-appearing or diaphoretic  Cardiovascular:      Rate and Rhythm: Normal rate  Pulses: Normal pulses  Dorsalis pedis pulses are 2+ on the right side and 2+ on the left side  Posterior tibial pulses are 2+ on the right side and 2+ on the left side  Pulmonary:      Effort: Pulmonary effort is normal  No respiratory distress  Musculoskeletal:         General: Tenderness present  Right foot: Normal range of motion  No deformity  Left foot: Normal range of motion  No deformity  Feet:    Feet:      Right foot:      Protective Sensation: 10 sites tested  10 sites sensed  Skin integrity: Skin integrity normal       Toenail Condition: Right toenails are normal       Left foot:      Protective Sensation: 10 sites tested  10 sites sensed  Skin integrity: Skin integrity normal       Toenail Condition: Left toenails are normal    Skin:     Capillary Refill: Capillary refill takes less than 2 seconds  Findings: No bruising, erythema, lesion or rash  Neurological:      Mental Status: She is alert and oriented to person, place, and time  Sensory: No sensory deficit  Motor: No weakness        Gait: Gait normal    Psychiatric:         Mood and Affect: Mood normal

## 2022-03-21 NOTE — PATIENT INSTRUCTIONS
RICE Therapy for Routine Care of Injuries  Many injuries can be cared for with rest, ice, compression, and elevation (RICE therapy)  This includes:  · Resting the injured part  · Putting ice on the injury  · Putting pressure (compression) on the injury  · Raising the injured part (elevation)  Using RICE therapy can help to lessen pain and swelling  Supplies needed:  · Ice  · Plastic bag  · Towel  · Elastic bandage  · Pillow or pillows to raise (elevate) your injured body part  How to care for your injury with RICE therapy  Rest  Limit your normal activities, and try not to use the injured part of your body  You can go back to your normal activities when your doctor says it is okay to do them and you feel okay  Ask your doctor if you should do exercises to help your injury get better  Ice  Put ice on the injured area  Do not put ice on your bare skin  · Put ice in a plastic bag  · Place a towel between your skin and the bag  · Leave the ice on for 20 minutes, 2-3 times a day  Use ice on as many days as told by your doctor  Compression  Compression means putting pressure on the injured area  This can be done with an elastic bandage  If an elastic bandage has been put on your injury:  · Do not wrap the bandage too tight  Wrap the bandage more loosely if part of your body away from the bandage is blue, swollen, cold, painful, or loses feeling (gets numb)  · Take off the bandage and put it on again  Do this every 3-4 hours or as told by your doctor  · See your doctor if the bandage seems to make your problems worse  Elevation  Elevation means keeping the injured area raised  If you can, raise the injured area above your heart or the center of your chest   Contact a doctor if:  · You keep having pain and swelling  · Your symptoms get worse  Get help right away if:  · You have sudden bad pain at your injury or lower than your injury  · You have redness or more swelling around your injury    · You have tingling or numbness at your injury or lower than your injury, and it does not go away when you take off the bandage  Summary  · Many injuries can be cared for using rest, ice, compression, and elevation (RICE therapy)  · You can go back to your normal activities when you feel okay and your doctor says it is okay  · Put ice on the injured area as told by your doctor  · Get help if your symptoms get worse or if you keep having pain and swelling

## 2022-03-31 ENCOUNTER — HOSPITAL ENCOUNTER (OUTPATIENT)
Dept: RADIOLOGY | Facility: MEDICAL CENTER | Age: 57
Discharge: HOME | End: 2022-03-31
Attending: PHYSICAL MEDICINE & REHABILITATION | Admitting: PHYSICAL MEDICINE & REHABILITATION
Payer: COMMERCIAL

## 2022-03-31 VITALS
TEMPERATURE: 97.3 F | SYSTOLIC BLOOD PRESSURE: 122 MMHG | OXYGEN SATURATION: 98 % | DIASTOLIC BLOOD PRESSURE: 78 MMHG | HEART RATE: 69 BPM | RESPIRATION RATE: 20 BRPM

## 2022-03-31 DIAGNOSIS — G89.29 CHRONIC NECK PAIN: ICD-10-CM

## 2022-03-31 DIAGNOSIS — G89.29 CHRONIC BILATERAL LOW BACK PAIN WITHOUT SCIATICA: ICD-10-CM

## 2022-03-31 DIAGNOSIS — M54.50 CHRONIC BILATERAL LOW BACK PAIN WITHOUT SCIATICA: ICD-10-CM

## 2022-03-31 DIAGNOSIS — M47.812 CERVICAL SPONDYLOSIS: ICD-10-CM

## 2022-03-31 DIAGNOSIS — M51.26 LUMBAR DISC HERNIATION: ICD-10-CM

## 2022-03-31 DIAGNOSIS — M79.18 MYOFASCIAL PAIN SYNDROME: ICD-10-CM

## 2022-03-31 DIAGNOSIS — M47.816 LUMBAR SPONDYLOSIS: ICD-10-CM

## 2022-03-31 DIAGNOSIS — M54.2 CHRONIC NECK PAIN: ICD-10-CM

## 2022-03-31 PROCEDURE — 62323 NJX INTERLAMINAR LMBR/SAC: CPT | Performed by: PHYSICAL MEDICINE & REHABILITATION

## 2022-03-31 RX ORDER — METHYLPREDNISOLONE ACETATE 80 MG/ML
80 INJECTION, SUSPENSION INTRA-ARTICULAR; INTRALESIONAL; INTRAMUSCULAR; PARENTERAL; SOFT TISSUE ONCE
Status: COMPLETED | OUTPATIENT
Start: 2022-03-31 | End: 2022-03-31

## 2022-03-31 RX ADMIN — IOHEXOL 1 ML: 300 INJECTION, SOLUTION INTRAVENOUS at 13:21

## 2022-03-31 RX ADMIN — METHYLPREDNISOLONE ACETATE 80 MG: 80 INJECTION, SUSPENSION INTRA-ARTICULAR; INTRALESIONAL; INTRAMUSCULAR; PARENTERAL; SOFT TISSUE at 13:22

## 2022-03-31 NOTE — H&P
History of Present Illness: The patient is a 55 y o  female who presents with complaints of low back pain    Patient Active Problem List   Diagnosis    Insomnia    History of BCG vaccination    Partial small bowel obstruction (HCC)    Pre-diabetes    Hyperlipidemia    Allergic rhinitis    Neck pain    Thyroid nodule    Chronic low back pain    Gastroesophageal reflux disease without esophagitis    Snoring    Chronic pain of both shoulders    Transaminitis    Sensation of fullness in left ear    Obstructive sleep apnea-hypopnea syndrome       No past medical history on file      Past Surgical History:   Procedure Laterality Date    BILATERAL OOPHORECTOMY Bilateral      SECTION      x2         Current Outpatient Medications:     Ascorbic Acid (vitamin C) 1000 MG tablet, Take 1 tablet (1,000 mg total) by mouth every 12 (twelve) hours, Disp: 28 tablet, Rfl: 0    Calcium Carb-Cholecalciferol (Oyster Shell Calcium w/D) 500-200 MG-UNIT TABS No, , Disp: , Rfl:     calcium carbonate-vitamin D (OSCAL-D) 500 mg-200 units per tablet, Take 1 tablet by mouth daily with breakfast, Disp: 30 tablet, Rfl: 2    cetirizine (ZyrTEC) 10 mg tablet, Take 1 tablet (10 mg total) by mouth daily, Disp: 30 tablet, Rfl: 1    cholecalciferol (VITAMIN D3) 1,000 units tablet, Take 2 tablets (2,000 Units total) by mouth daily, Disp: 28 tablet, Rfl: 0    fluticasone (FLONASE) 50 mcg/act nasal spray, 1 spray into each nostril daily, Disp: 11 1 mL, Rfl: 1    lidocaine (LIDODERM) 5 %, Apply 1 patch topically daily Remove & Discard patch within 12 hours or as directed by MD, Disp: 30 patch, Rfl: 0    methocarbamol (ROBAXIN) 500 mg tablet, Take 1 tablet (500 mg total) by mouth daily at bedtime as needed for muscle spasms, Disp: 30 tablet, Rfl: 0    Multiple Vitamin (multivitamin) tablet, Take 1 tablet by mouth daily, Disp: 14 tablet, Rfl: 0    naproxen (NAPROSYN) 500 mg tablet, Take 1 tablet (500 mg total) by mouth 2 (two) times a day with meals, Disp: 60 tablet, Rfl: 0    naproxen (Naprosyn) 500 mg tablet, Take 1 tablet (500 mg total) by mouth 2 (two) times a day with meals, Disp: 30 tablet, Rfl: 0    omeprazole (PriLOSEC) 20 mg delayed release capsule, Take 1 capsule (20 mg total) by mouth daily, Disp: 90 capsule, Rfl: 1    Current Facility-Administered Medications:     iohexol (OMNIPAQUE) 300 mg/mL injection 50 mL, 50 mL, Epidural, Once, Shameka Stai, DO    methylPREDNISolone acetate (DEPO-MEDROL) injection 80 mg, 80 mg, Epidural, Once, Shameka Stai, DO    No Known Allergies    Physical Exam:   Vitals:    03/31/22 1308   BP: 120/82   Pulse: 72   Resp: 20   Temp: (!) 97 3 °F (36 3 °C)   SpO2: 97%     General: Awake, Alert, Oriented x 3, Mood and affect appropriate  Respiratory: Respirations even and unlabored  Cardiovascular: Peripheral pulses intact; no edema  Musculoskeletal Exam:  Tenderness to palpation bilateral lumbar paraspinals    ASA Score: 2    Patient/Chart Verification  Patient ID Verified: Verbal  ID Band Applied: No  Consents Confirmed: Procedural,To be obtained in the Pre-Procedure area  H&P( within 30 days) Verified: To be obtained in the Pre-Procedure area  Interval H&P(within 24 hr) Complete (required for Outpatients and Surgery Admit only): To be obtained in the Pre-Procedure area  Allergies Reviewed: Yes  Anticoag/NSAID held?: Yes  Currently on antibiotics?: Yes  Pregnancy denied?: NA    Assessment:   1  Chronic neck pain    2  Chronic bilateral low back pain without sciatica    3  Lumbar spondylosis    4  Cervical spondylosis    5  Lumbar disc herniation    6   Myofascial pain syndrome        Plan: LESI (L4-L5)

## 2022-03-31 NOTE — DISCHARGE INSTRUCTIONS
Epidural Steroid Injection   WHAT YOU NEED TO KNOW:   An epidural steroid injection (JOEL) is a procedure to inject steroid medicine into the epidural space  The epidural space is between your spinal cord and vertebrae  Steroids reduce inflammation and fluid buildup in your spine that may be causing pain  You may be given pain medicine along with the steroids  ACTIVITY  · Do not drive or operate machinery today  · No strenuous activity today - bending, lifting, etc   · You may resume normal activites starting tomorrow - start slowly and as tolerated  · You may shower today, but no tub baths or hot tubs  · You may have numbness for several hours from the local anesthetic  Please use caution and common sense, especially with weight-bearing activities  CARE OF THE INJECTION SITE  · If you have soreness or pain, apply ice to the area today (20 minutes on/20 minutes off)  · Starting tomorrow, you may use warm, moist heat or ice if needed  · You may have an increase or change in your discomfort for 36-48 hours after your treatment  · Apply ice and continue with any pain medication you have been prescribed  · [facility]ify [facility] if you have any of the following: redness, drainage, swelling, headache, stiff neck or fever above 100°F     SPECIAL INSTRUCTIONS  · Our office will contact you in approximately 7 days for a progress report  MEDICATIONS  · Continue to take all routine medications  · Our office may have instructed you to hold some medications  As no general anesthesia was used in today's procedure, you should not experience any side effects related to anesthesia  If you have a problem specifically related to your procedure, please call our office at [phone]  Problems not related to your procedure should be directed to your primary care physician    INSTRUCCIONES PARA EL KAY DE JEFF INYECCIÓN EPIDURAL DE ESTEROIDES    ACTIVIDAD   No conduzca ni opere maquinarias por wilmer    No realice actividades extenuantes por wilmer, ronan agacharse, levantar objetos, etc    Puede retomar angel actividades normales desde mañana  Comience progresivamente y en la medida que lo tolere   Puede ducharse, donna no se bañe en tinas ni en jacuzzis por wilmer   Puede sentir entumecimiento donna varias horas debido a la anestesia local  Sea precavido y use el sentido común, en especial con las actividades que implican la carga de [place]  CUIDADO DEL ÁREA DE APLICACIÓN DE LA INYECCIÓN   Si siente molestias o dolor, aplique hielo en el área por wilmer (colóquelo donna 20 minutos y retírelo donna otros 20 minutos)   A partir de mañana, podrá aplicar calor húmedo o hielo, si lo necesita   Puede experimentar un aumento o cambio en el malestar donna las 36-48 horas posteriores al tratamiento  Aplique hielo y continúe tomando cualquier analgésico que le hayan recetado   Informe a [facility] si se presenta alguno de estos síntomas: enrojecimiento, secreción, inflamación, dolor de raffaele, rigidez de faheem o fiebre superior a 100 °F  INSTRUCCIONES ESPECIALES   Se pondrán en contacto de nuestro consultorio con usted en aproximadamente 7 días para pedir un informe de progreso  MEDICAMENTOS   Continúe tomando todos angel medicamentos de [place]   Es posible que en nuestro consultorio le hayan indicado que deje de tana algunos medicamentos   Puede volver a tomarlos el:              Si tiene algún problema relacionado específicamente con el procedimiento, llame a nuestro consultorio al [phone]  Si tiene problemas que no están relacionados con acosta procedimiento, debe comunicarse con acosta médico de cabecera

## 2022-04-07 ENCOUNTER — TELEPHONE (OUTPATIENT)
Dept: PAIN MEDICINE | Facility: CLINIC | Age: 57
End: 2022-04-07

## 2022-04-07 NOTE — TELEPHONE ENCOUNTER
Pt reports 20% improvement post inj  (Beninese speaking)  Pain level 5/10  Pt aware I will call next week for an update

## 2022-05-03 ENCOUNTER — HOSPITAL ENCOUNTER (EMERGENCY)
Facility: HOSPITAL | Age: 57
Discharge: HOME | End: 2022-05-03
Attending: EMERGENCY MEDICINE | Admitting: EMERGENCY MEDICINE
Payer: COMMERCIAL

## 2022-05-03 VITALS
OXYGEN SATURATION: 100 % | RESPIRATION RATE: 16 BRPM | HEART RATE: 68 BPM | SYSTOLIC BLOOD PRESSURE: 121 MMHG | WEIGHT: 157.19 LBS | DIASTOLIC BLOOD PRESSURE: 78 MMHG | BODY MASS INDEX: 30.7 KG/M2 | TEMPERATURE: 98.1 F

## 2022-05-03 DIAGNOSIS — N39.0 UTI (URINARY TRACT INFECTION): Primary | ICD-10-CM

## 2022-05-03 LAB
BACTERIA UR QL AUTO: ABNORMAL /HPF
BILIRUB UR QL STRIP: NEGATIVE
CLARITY UR: CLEAR
COLOR UR: YELLOW
GLUCOSE UR STRIP-MCNC: NEGATIVE MG/DL
HGB UR QL STRIP.AUTO: NEGATIVE
KETONES UR STRIP-MCNC: NEGATIVE MG/DL
LEUKOCYTE ESTERASE UR QL STRIP: 25
MUCOUS THREADS UR QL AUTO: ABNORMAL
NITRITE UR QL STRIP: NEGATIVE
NON-SQ EPI CELLS URNS QL MICRO: ABNORMAL /HPF
PH UR STRIP.AUTO: 6 [PH]
PROT UR STRIP-MCNC: NEGATIVE MG/DL
RBC #/AREA URNS AUTO: ABNORMAL /HPF
SP GR UR STRIP.AUTO: 1.02 (ref 1–1.04)
UROBILINOGEN UA: NEGATIVE MG/DL
WBC #/AREA URNS AUTO: ABNORMAL /HPF

## 2022-05-03 PROCEDURE — 99284 EMERGENCY DEPT VISIT MOD MDM: CPT | Performed by: EMERGENCY MEDICINE

## 2022-05-03 PROCEDURE — 81003 URINALYSIS AUTO W/O SCOPE: CPT | Performed by: EMERGENCY MEDICINE

## 2022-05-03 PROCEDURE — 81001 URINALYSIS AUTO W/SCOPE: CPT | Performed by: EMERGENCY MEDICINE

## 2022-05-03 PROCEDURE — 99283 EMERGENCY DEPT VISIT LOW MDM: CPT

## 2022-05-03 RX ORDER — CEPHALEXIN 250 MG/1
500 CAPSULE ORAL 4 TIMES DAILY
Qty: 56 CAPSULE | Refills: 0 | Status: SHIPPED | OUTPATIENT
Start: 2022-05-03 | End: 2022-05-10

## 2022-05-03 NOTE — ED PROVIDER NOTES
PHistory  Chief Complaint   Patient presents with    Possible UTI     a few days of lower back pain with urinary burning  Patient is a 60-year-old female who presents with a 3 day history of urinary symptoms  +frequency, urgency and burning with urination  +low back pain  No n/v/d  No f/s/c  No meds  Prior to Admission Medications   Prescriptions Last Dose Informant Patient Reported? Taking? Ascorbic Acid (vitamin C) 1000 MG tablet   No No   Sig: Take 1 tablet (1,000 mg total) by mouth every 12 (twelve) hours   Calcium Carb-Cholecalciferol (Oyster Shell Calcium w/D) 500-200 MG-UNIT TABS No   Yes No   Multiple Vitamin (multivitamin) tablet   No No   Sig: Take 1 tablet by mouth daily   calcium carbonate-vitamin D (OSCAL-D) 500 mg-200 units per tablet   No No   Sig: Take 1 tablet by mouth daily with breakfast   cetirizine (ZyrTEC) 10 mg tablet   No No   Sig: Take 1 tablet (10 mg total) by mouth daily   cholecalciferol (VITAMIN D3) 1,000 units tablet   No No   Sig: Take 2 tablets (2,000 Units total) by mouth daily   fluticasone (FLONASE) 50 mcg/act nasal spray   No No   Si spray into each nostril daily   lidocaine (LIDODERM) 5 %   No No   Sig: Apply 1 patch topically daily Remove & Discard patch within 12 hours or as directed by MD   methocarbamol (ROBAXIN) 500 mg tablet   No No   Sig: Take 1 tablet (500 mg total) by mouth daily at bedtime as needed for muscle spasms   naproxen (NAPROSYN) 500 mg tablet   No No   Sig: Take 1 tablet (500 mg total) by mouth 2 (two) times a day with meals   naproxen (Naprosyn) 500 mg tablet   No No   Sig: Take 1 tablet (500 mg total) by mouth 2 (two) times a day with meals   omeprazole (PriLOSEC) 20 mg delayed release capsule   No No   Sig: Take 1 capsule (20 mg total) by mouth daily      Facility-Administered Medications: None       History reviewed  No pertinent past medical history      Past Surgical History:   Procedure Laterality Date    BILATERAL OOPHORECTOMY Bilateral      SECTION      x2       History reviewed  No pertinent family history  I have reviewed and agree with the history as documented  E-Cigarette/Vaping    E-Cigarette Use Never User      E-Cigarette/Vaping Substances    Nicotine No     THC No     CBD No     Flavoring No     Other No     Unknown No      Social History     Tobacco Use    Smoking status: Never Smoker    Smokeless tobacco: Never Used   Vaping Use    Vaping Use: Never used   Substance Use Topics    Alcohol use: Not Currently    Drug use: Never       Review of Systems   Constitutional: Negative  HENT: Negative  Eyes: Negative  Respiratory: Negative  Cardiovascular: Negative  Gastrointestinal: Negative  Endocrine: Negative  Genitourinary: Positive for dysuria and frequency  Musculoskeletal: Negative  Skin: Negative  Allergic/Immunologic: Negative  Neurological: Negative  Hematological: Negative  Psychiatric/Behavioral: Negative  All other systems reviewed and are negative  Physical Exam  Physical Exam  Vitals and nursing note reviewed  Constitutional:       Appearance: Normal appearance  She is obese  HENT:      Head: Normocephalic and atraumatic  Cardiovascular:      Rate and Rhythm: Normal rate and regular rhythm  Pulses: Normal pulses  Heart sounds: Normal heart sounds  Pulmonary:      Effort: Pulmonary effort is normal       Breath sounds: Normal breath sounds  Abdominal:      General: Bowel sounds are normal       Palpations: Abdomen is soft  Tenderness: There is no right CVA tenderness or left CVA tenderness  Musculoskeletal:         General: Normal range of motion  Cervical back: Normal range of motion and neck supple  Skin:     General: Skin is warm and dry  Capillary Refill: Capillary refill takes less than 2 seconds  Neurological:      General: No focal deficit present        Mental Status: She is alert and oriented to person, place, and time  Psychiatric:         Mood and Affect: Mood normal          Behavior: Behavior normal          Vital Signs  ED Triage Vitals [05/03/22 1214]   Temperature Pulse Respirations Blood Pressure SpO2   98 1 °F (36 7 °C) 68 16 121/78 100 %      Temp Source Heart Rate Source Patient Position - Orthostatic VS BP Location FiO2 (%)   Oral Monitor Sitting Right arm --      Pain Score       --           Vitals:    05/03/22 1214   BP: 121/78   Pulse: 68   Patient Position - Orthostatic VS: Sitting         Visual Acuity      ED Medications  Medications - No data to display    Diagnostic Studies  Results Reviewed     Procedure Component Value Units Date/Time    Urine Microscopic [[id number]]  (Abnormal) Collected: 05/03/22 1226    Lab Status: Final result Specimen: Urine, Clean Catch Updated: 05/03/22 1300     RBC, UA None Seen /hpf      WBC, UA 0-1 /hpf      Epithelial Cells Occasional /hpf      Bacteria, UA Occasional /hpf      MUCUS THREADS Moderate    UA w Reflex to Microscopic w Reflex to Culture [[id number]]  (Abnormal) Collected: 05/03/22 1226    Lab Status: Final result Specimen: Urine, Clean Catch Updated: 05/03/22 1233     Color, UA Yellow     Clarity, UA Clear     Specific [place], UA 1 020     pH, UA 6 0     Leukocytes, UA 25 0     Nitrite, UA Negative     Protein, UA Negative mg/dl      Glucose, UA Negative mg/dl      Ketones, UA Negative mg/dl      Bilirubin, UA Negative     Blood, UA Negative     UROBILINOGEN UA Negative mg/dL                  No orders to display              Procedures  Procedures         ED Course                               SBIRT 22yo+      Most Recent Value   SBIRT (22 yo +)    In order to provide better care to our patients, we are screening all of our patients for alcohol and drug use  Would it be okay to ask you these screening questions?  No Filed at: 05/03/2022 1219                    MDM  Number of Diagnoses or Management Options     Amount and/or Complexity of Data Reviewed  Clinical lab tests: ordered and reviewed  Review and summarize past medical records: yes  Independent visualization of images, tracings, or specimens: yes        Disposition  Final diagnoses:   UTI (urinary tract infection)     Time reflects when diagnosis was documented in both MDM as applicable and the Disposition within this note     Time User Action Codes Description Comment    5/3/2022  1:10 PM Valentine Albert Add [N39 0] UTI (urinary tract infection)       ED Disposition     ED Disposition Condition Date/Time Comment    Discharge Stable Tue May 3, 2022  1:10 PM BARTOLO LOCKWOOD  [facility] CHILDREN WITH DEVELOPMENTAL discharge to home/self care              Follow-up Information     Follow up With Specialties Details Why Contact Info Additional [address]   [address] [facility] [id number]  [address], [address], [address] [place], [place], [address]          Discharge Medication List as of 5/3/2022  1:11 PM      START taking these medications    Details   cephalexin (KEFLEX) 250 mg capsule Take 2 capsules (500 mg total) by mouth 4 (four) times a day for 7 days, Starting Tue 5/3/2022, Until Tue 5/10/2022, Normal         CONTINUE these medications which have NOT CHANGED    Details   Ascorbic Acid (vitamin C) 1000 MG tablet Take 1 tablet (1,000 mg total) by mouth every 12 (twelve) hours, Starting Sun 11/29/2020, Normal      Calcium Carb-Cholecalciferol (Oyster Shell Calcium w/D) 500-200 MG-UNIT TABS No Starting Mon 1/31/2022, Historical Med      calcium carbonate-vitamin D (OSCAL-D) 500 mg-200 units per tablet Take 1 tablet by mouth daily with breakfast, Starting Thu 12/16/2021, Normal      cetirizine (ZyrTEC) 10 mg tablet Take 1 tablet (10 mg total) by mouth daily, Starting Fri 10/1/2021, Normal      cholecalciferol (VITAMIN D3) 1,000 units tablet Take 2 tablets (2,000 Units total) by mouth daily, Starting Sun 11/29/2020, Normal      fluticasone (FLONASE) 50 mcg/act nasal spray 1 spray into each nostril daily, Starting Fri 10/1/2021, Normal      lidocaine (LIDODERM) 5 % Apply 1 patch topically daily Remove & Discard patch within 12 hours or as directed by MD, Starting Mon 8/2/2021, Normal      methocarbamol (ROBAXIN) 500 mg tablet Take 1 tablet (500 mg total) by mouth daily at bedtime as needed for muscle spasms, Starting u 11/4/2021, Normal      Multiple Vitamin (multivitamin) tablet Take 1 tablet by mouth daily, Starting Sun 11/29/2020, Normal      !! naproxen (NAPROSYN) 500 mg tablet Take 1 tablet (500 mg total) by mouth 2 (two) times a day with meals, Starting Fri 11/19/2021, Normal      !! naproxen (Naprosyn) 500 mg tablet Take 1 tablet (500 mg total) by mouth 2 (two) times a day with meals, Starting Mon 3/7/2022, Normal      omeprazole (PriLOSEC) 20 mg delayed release capsule Take 1 capsule (20 mg total) by mouth daily, Starting Mon 11/15/2021, Normal       !! - Potential duplicate medications found  Please discuss with provider  No discharge procedures on file      PDMP Review     None          ED Provider  Electronically Signed by           Katty Harris MD  05/03/22 5407

## 2022-05-04 ENCOUNTER — OFFICE VISIT (OUTPATIENT)
Dept: SLEEP CENTER | Facility: CLINIC | Age: 57
End: 2022-05-04
Payer: COMMERCIAL

## 2022-05-04 VITALS
HEART RATE: 69 BPM | SYSTOLIC BLOOD PRESSURE: 135 MMHG | BODY MASS INDEX: 31.22 KG/M2 | WEIGHT: 159 LBS | HEIGHT: 60 IN | DIASTOLIC BLOOD PRESSURE: 76 MMHG

## 2022-05-04 DIAGNOSIS — R06.83 SNORING: ICD-10-CM

## 2022-05-04 DIAGNOSIS — G47.33 OBSTRUCTIVE SLEEP APNEA-HYPOPNEA SYNDROME: Primary | ICD-10-CM

## 2022-05-04 DIAGNOSIS — E66.9 CLASS 1 OBESITY WITHOUT SERIOUS COMORBIDITY WITH BODY MASS INDEX (BMI) OF 31.0 TO 31.9 IN ADULT, UNSPECIFIED OBESITY TYPE: ICD-10-CM

## 2022-05-04 PROBLEM — E66.811 CLASS 1 OBESITY WITHOUT SERIOUS COMORBIDITY WITH BODY MASS INDEX (BMI) OF 31.0 TO 31.9 IN ADULT: Status: ACTIVE | Noted: 2022-05-04

## 2022-05-04 PROCEDURE — 99214 OFFICE O/P EST MOD 30 MIN: CPT | Performed by: INTERNAL MEDICINE

## 2022-05-04 NOTE — ASSESSMENT & PLAN NOTE
· Mild CHRISTELLE with an average AHI of 5 5 events per hours she is here for compliance check and follow-up appointment she has been sleeping better since she started using the CPAP and she feels more energy during the day she is accompanied by her son who has been translating the conversation    Reviewed her compliance today and she has been using the machine average 4 5 hours and residual AHI of 1 event per hour and no large leak  · She was requesting to see the DME company to discuss with them the size of the machine and the mask I would like her to meet with them

## 2022-05-04 NOTE — PATIENT INSTRUCTIONS
Apnea del sueño   CUIDADO AMBULATORIO:   La apnea del sueño es tremayne afección que provoca que usted deje de respirar a menudo mientras duerme  Tipos de apnea del sueño:  · La apnea obstructiva del sueño (AOS) es la más común  Los músculos y tejidos alrededor de acosta garganta se relajan y obstruyen el paso del aire  La obesidad, el consumo de alcohol o cigarrillos o los antecedentes familiares son causas comunes  La AOS puede aumentar el riesgo de complicaciones después de [place]  · La apnea central del sueño (ACS) significa que el cerebro no envía señales a los músculos que controlan la respiración  Usted no respira aunque angel vías respiratorias estén abiertas  Las causas comunes incluyen afecciones médicas ronan insuficiencia cardíaca, ser mayor de 36 años o el uso de opiáceos  · La apnea del sueño compleja (o mixta) significa que usted tiene tanto apnea obstructiva ronan apnea central del sueño  Los signos y síntomas comunes son:  · Ronquidos zeina o largas pausas en la respiración    · Sentirse soñoliento, lento y cansado donna el día    · Resoplos, jadeos o asfixia mientras duerme y despertarse repentinamente debido a esto    · Sentirse irritable donna el día    · W  ERIC [place] en la boca o dolor de raffaele por las mañanas    · Sudoración nocturna intensa    · Dificultad para pensar, recordar o enfocarse en quehaceres para el día siguiente    Llame al [employer] de emergencias local (911 en los [place]) si:  · Usted tiene dolor torácico y dificultad para respirar  Llame a acosta médico si:  · Tiene nuevos signos y síntomas, o estos Isabela Melisa  · Usted tiene preguntas o inquietudes acerca de acosta condición o cuidado  Tratamiento depende del tipo de apnea que usted tenga  · Un dispositivo bucal podría ser necesario si tiene apnea del sueño leve  Están diseñados para mantener la garganta abierta  Pregunte a acosta dentista o médico acerca del mejor dispositivo bucal para usted      · [place] puede utilizarse para ayudarlo a tana más aire donna el sueño  Se coloca tremayne máscara sobre la nariz y la boca, o solo en la nariz  La máscara se conecta a la máquina  Usted recibirá el aire por la [place]  ? Tremayne máquina para medir la presión positiva continua en las vías respiratorias (CPAP, en inglés) se [place] para mantener abiertas angel vías respiratorias mientras usted duerme  La máquina sopla tremayne corriente de aire suave dentro de la máscara cuando usted respira  [place] ayuda a mantener angel vías respiratorias abiertas para que usted pueda respirar con más regularidad  Podrían darle oxígeno adicional a través de la [place]  ? La máquina de presión positiva de doble nivel en las vías respiratorias (BiPAP, en inglés) administra aire, donna disminuye la presión cuando exhala  ? Un servo-ventilador adaptativo (SVA) es tremayne máquina que aprende acosta patrón de respiración habitual  [facility], utiliza la presión para darle aire y evitar las interrupciones de la respiración  · La cirugía para ampliar las vías respiratorias o eliminar los tejidos sobrantes puede ser necesaria  La cirugía suele considerarse solamente si otros tratamientos no funcionan  Controle la apnea del sueño:  · [place] y [place] un peso saludable  Pregúntele a acosta médico cuál es el peso ideal para usted  [place] que lo ayude a crear un plan seguro para bajar de peso si tiene sobrepeso  Incluso un pequeño objetivo de tremayne pérdida de peso del 10% puede mejorar los síntomas  · No fume  La nicotina y otras sustancias químicas que contienen los cigarrillos y cigarros pueden dañar los pulmones  Pida información a acosta médico si usted actualmente fuma y necesita ayuda para dejar de fumar  Los cigarrillos electrónicos o el tabaco sin humo igualmente contienen nicotina  Consulte con acosta médico antes de [employer]  · No ingiera alcohol ni tome sedantes antes de dormir   El alcohol y los sedantes pueden relajar los músculos y tejidos que están alrededor de acosta garganta  [place] puede obstruir el flujo de aire a angel pulmones  · Duerma de un lado o use almohadas especiales diseñadas para evitar la apnea del sueño  [place]ota que acosta lengua y otros tejidos obstruyan la garganta  Usted también puede intentar elevar la cabecera de acosta cama  Acuda a angel consultas de control con acosta médico o especialista según le indicaron: Es posible que a usted le carlos exámenes de bret donna las citas de seguimiento  [place] con acosta médico para determinar el equipo respiratorio y los ajustes adecuados para usted  Anote angel preguntas para que se acuerde de hacerlas donna angel visitas  © Copyright [employer] 2022 Information is for End User's use only and may not be sold, redistributed or otherwise used for commercial purposes  All illustrations and images included in CareNotes® are the copyrighted property of [employer]  or [address] [place] [address] es sólo para uso en educación  Acosta intención no es darle un consejo médico sobre enfermedades o tratamientos  Colsulte con acosta [place] farmacéutico antes de seguir cualquier régimen médico para saber si es seguro y efectivo para usted  CPAP   CUIDADO AMBULATORIO:   La CPAP (presión positiva continua en la vía aérea) es un tratamiento que [place] tremayne presión de aire para mantener las vías respiratorias abiertas mientras duerme  La CPAP se utiliza para tratar la apnea obstructiva del sueño (AOS)  Benjamen Terry CPAP conecta la máscara a la máquina con [place]  La presión de [place] las vías respiratorias se colapsen u obstruyan donna el sueño  Use acosta máquina de CPAP al dormir, incluso cuando thi tremayne siesta  Puede que tenga que usar tremayne máquina de CPAP por el cristy de acosta milvia  Facilite el uso de la CPAP:  · Al principio, trate de usar acosta máquina de CPAP donna algunas horas cada noche  Luego lentamente aumente la cantidad de tiempo que [place] acosta máquina   Se necesita tiempo para adaptarse al tratamiento con CPAP  · Puede que necesite utilizar tremayne crema hidratante especial hecha para los usuarios de CPAP  Es posible que necesite tremayne máscara de diferente tamaño, forma o material  Hable con brewster médico si la máscara se siente incómoda o irrita brewster piel  · Use un michael nasal de solución salina antes de acostarse para ayudar a aliviar la irritación nasal  Use tremayne cole para barbilla para ayudar a mantener la boca cerrada  Un tipo diferente de máscara puede ayudar con la sequedad en la boca  Algunas máquinas vienen con un humidificador térmico para ayudar a aliviar estos síntomas  · Hable con brewster médico si tiene problemas para adaptarse a la presión de aire  El médico puede decirle cómo ajustar la presión de aire en la máquina CPAP  Puede que necesite iniciar en tremayne presión más baja y aumentarla lentamente con el tiempo  Llame a brewster médico si:  · Usted continúa sintiéndose con mucho sueño donna el día, incluso después de usar brewster dispositivo CPAP según las indicaciones  · La CPAP está causando un problema, ronan un sarpullido, que no mejora  · Usted tiene preguntas o inquietudes sobre brewster condición, cuidado o equipo    Acuda a la consulta de control con brewster médico según las indicaciones: Informe a brewster médico si la máscara no se ajusta correctamente  Anote angel preguntas para que se acuerde de hacerlas donna angel visitas  © Copyright [address] 2022 Information is for End User's use only and may not be sold, redistributed or otherwise used for commercial purposes  All illustrations and images included in CareNotes® are the copyrighted property of [employer]  [address] es sólo para uso en educación  [place] intención no es darle un consejo médico sobre enfermedades o tratamientos  Colsulte con brewster Sarai Lint farmacéutico antes de seguir cualquier régimen médico para saber si es seguro y efectivo para usted

## 2022-05-04 NOTE — PROGRESS NOTES
Review of Systems      Genitourinary need to urinate more than twice a night and post menopausal (no peroids)   Cardiology palpitations/fluttering feeling in the chest   Gastrointestinal none   Neurology awaken with headache   Constitutional none   Integumentary itching   Psychiatry none   Musculoskeletal back pain   Pulmonary snoring   ENT ringing in ears   Endocrine excessive thirst   Hematological none

## 2022-05-04 NOTE — PROGRESS NOTES
Pulmonary/Sleep Follow Up Note   Kingsley Linda 55 y o  female MRN: [id number]  5/4/2022      Assessment and Plan:    1  Obstructive sleep apnea-hypopnea syndrome  Assessment & Plan:  · Mild CHRISTELLE with an average AHI of 5 5 events per hours she is here for compliance check and follow-up appointment she has been sleeping better since she started using the CPAP and she feels more energy during the day she is accompanied by her son who has been translating the conversation  Reviewed her compliance today and she has been using the machine average 4 5 hours and residual AHI of 1 event per hour and no large leak  · She was requesting to see the DME company to discuss with them the size of the machine and the mask I would like her to meet with them      2  Snoring  Assessment & Plan: With obstructive sleep apnea as detailed above      3  Class 1 obesity without serious comorbidity with body mass index (BMI) of 31 0 to 31 9 in adult, unspecified obesity type  Assessment & Plan: With BMI 31 05, noted, she would benefit from weight loss          No follow-ups on file  History of Present Illness   HPI:  Kingsley Linda is a 55 y o  female who is here for follow-up appointment  The patient used to follow up with sleep clinic the last was seen in January for complain of frequent awakenings with stopping breathing, underwent a sleep study found to have mild obstructive sleep apnea and subsequently she was started on a trial of auto titrating CPAP with some improvement     Of her symptoms      Review of Systems      Genitourinary need to urinate more than twice a night and post menopausal (no peroids)   Cardiology palpitations/fluttering feeling in the chest   Gastrointestinal none   Neurology awaken with headache   Constitutional none   Integumentary itching   Psychiatry none   Musculoskeletal back pain   Pulmonary snoring   ENT ringing in ears   Endocrine excessive thirst   Hematological none           Historical Information   History reviewed  No pertinent past medical history  Past Surgical History:   Procedure Laterality Date    BILATERAL OOPHORECTOMY Bilateral      SECTION      x2     History reviewed  No pertinent family history        Meds/Allergies     Current Outpatient Medications:     Ascorbic Acid (vitamin C) 1000 MG tablet, Take 1 tablet (1,000 mg total) by mouth every 12 (twelve) hours, Disp: 28 tablet, Rfl: 0    Calcium Carb-Cholecalciferol (Oyster Shell Calcium w/D) 500-200 MG-UNIT TABS No, , Disp: , Rfl:     calcium carbonate-vitamin D (OSCAL-D) 500 mg-200 units per tablet, Take 1 tablet by mouth daily with breakfast, Disp: 30 tablet, Rfl: 2    cephalexin (KEFLEX) 250 mg capsule, Take 2 capsules (500 mg total) by mouth 4 (four) times a day for 7 days, Disp: 56 capsule, Rfl: 0    cetirizine (ZyrTEC) 10 mg tablet, Take 1 tablet (10 mg total) by mouth daily, Disp: 30 tablet, Rfl: 1    cholecalciferol (VITAMIN D3) 1,000 units tablet, Take 2 tablets (2,000 Units total) by mouth daily, Disp: 28 tablet, Rfl: 0    fluticasone (FLONASE) 50 mcg/act nasal spray, 1 spray into each nostril daily, Disp: 11 1 mL, Rfl: 1    lidocaine (LIDODERM) 5 %, Apply 1 patch topically daily Remove & Discard patch within 12 hours or as directed by MD, Disp: 30 patch, Rfl: 0    methocarbamol (ROBAXIN) 500 mg tablet, Take 1 tablet (500 mg total) by mouth daily at bedtime as needed for muscle spasms, Disp: 30 tablet, Rfl: 0    Multiple Vitamin (multivitamin) tablet, Take 1 tablet by mouth daily, Disp: 14 tablet, Rfl: 0    naproxen (NAPROSYN) 500 mg tablet, Take 1 tablet (500 mg total) by mouth 2 (two) times a day with meals, Disp: 60 tablet, Rfl: 0    naproxen (Naprosyn) 500 mg tablet, Take 1 tablet (500 mg total) by mouth 2 (two) times a day with meals, Disp: 30 tablet, Rfl: 0    omeprazole (PriLOSEC) 20 mg delayed release capsule, Take 1 capsule (20 mg total) by mouth daily, Disp: 90 capsule, Rfl: 1  No Known Allergies    Vitals: Blood pressure 135/76, pulse 69, height 5' (1 524 m), weight 72 1 kg (159 lb), not currently breastfeeding  Body mass index is 31 05 kg/m²  Physical Exam  General:  Awake alert and oriented x 3, conversant without conversational dyspnea, NAD, normal affect  HEENT:   Sclera noninjected, nonicteric OU, Nares patent,  no craniofacial abnormalities, Mucous membranes, moist, no oral lesions, normal dentition  NECK:  Trachea midline, no accessory muscle use, no stridor,  JVP not elevated  CARDIAC: Reg, single s1/S2, no m/r/g  PULM: CTA bilaterally no wheezing, rhonchi or rales  ABD: Soft nontender, nondistended, no rebound, no rigidity, no guarding  EXT: No cyanosis, no clubbing, no edema, normal capillary refill  NEURO: no focal neurologic deficits, AAOx3, moving all extremities appropriately    Labs: I have personally reviewed pertinent lab results  , ABG: No results found for: PHART, NLA6JFI, PO2ART, XPK2ZWI, S1TTWJDM, BEART, SOURCE, BNP: No results found for: BNP, CBC: No results found for: WBC, HGB, HCT, MCV, PLT, ADJUSTEDWBC, MCH, MCHC, RDW, MPV, NRBC, CMP: No results found for: SODIUM, K, CL, CO2, ANIONGAP, BUN, CREATININE, GLUCOSE, CALCIUM, AST, ALT, ALKPHOS, PROT, BILITOT, EGFR, PT/INR: No results found for: PT, INR, Troponin: No results found for: TROPONINI  Lab Results   Component Value Date    WBC 8 23 05/22/2021    HGB 12 4 05/22/2021    HCT 39 5 05/22/2021    MCV 86 05/22/2021     05/22/2021     Lab Results   Component Value Date    CALCIUM 8 9 08/03/2021    K 4 0 08/03/2021    CO2 29 08/03/2021     08/03/2021    BUN 14 08/03/2021    CREATININE 0 69 08/03/2021     No results found for: IGE  Lab Results   Component Value Date    ALT 40 (H) 08/03/2021    AST 38 (H) 08/03/2021    ALKPHOS 70 08/03/2021           Sleep studies: I have personally reviewed pertinent reports  HST: AHI 5 5 events/hr     Compliance report:I have personally reviewed pertinent reports  Type of CPAP:  Auto 5-20                                   Percent usage: 67%                                   Average time used: 4 hrs 12 min                                  Time in large leak: not sig                                   Residual AHI: 1 event/hr             Li Sands MD  [facility]       Portions of the record may have been created with voice recognition software  Occasional wrong word or "sound a like" substitutions may have occurred due to the inherent limitations of voice recognition software  Read the chart carefully and recognize, using context, where substitutions have occurred

## 2022-05-11 ENCOUNTER — PROCEDURE VISIT (OUTPATIENT)
Dept: PAIN MEDICINE | Facility: MEDICAL CENTER | Age: 57
End: 2022-05-11
Payer: COMMERCIAL

## 2022-05-11 DIAGNOSIS — M79.18 MYOFASCIAL PAIN SYNDROME: Primary | ICD-10-CM

## 2022-05-11 PROCEDURE — 76942 ECHO GUIDE FOR BIOPSY: CPT | Performed by: PHYSICAL MEDICINE & REHABILITATION

## 2022-05-11 PROCEDURE — 20552 NJX 1/MLT TRIGGER POINT 1/2: CPT | Performed by: PHYSICAL MEDICINE & REHABILITATION

## 2022-05-11 RX ORDER — BUPIVACAINE HYDROCHLORIDE 2.5 MG/ML
10 INJECTION, SOLUTION EPIDURAL; INFILTRATION; INTRACAUDAL ONCE
Status: COMPLETED | OUTPATIENT
Start: 2022-05-11 | End: 2022-05-11

## 2022-05-11 RX ORDER — METHYLPREDNISOLONE ACETATE 40 MG/ML
40 INJECTION, SUSPENSION INTRA-ARTICULAR; INTRALESIONAL; INTRAMUSCULAR; SOFT TISSUE ONCE
Status: COMPLETED | OUTPATIENT
Start: 2022-05-11 | End: 2022-05-11

## 2022-05-11 RX ADMIN — METHYLPREDNISOLONE ACETATE 40 MG: 40 INJECTION, SUSPENSION INTRA-ARTICULAR; INTRALESIONAL; INTRAMUSCULAR; SOFT TISSUE at 13:17

## 2022-05-11 RX ADMIN — BUPIVACAINE HYDROCHLORIDE 10 ML: 2.5 INJECTION, SOLUTION EPIDURAL; INFILTRATION; INTRACAUDAL at 13:18

## 2022-05-11 NOTE — PROGRESS NOTES
Indication:  Muscular pain  Preprocedure diagnosis:  1  Myofascial pain syndrome  Postprocedure diagnosis:  1  Myofascial pain syndrome    Procedure:  Ultrasound-guided bilateral cervical thoracic paraspinal muscle trigger point injection(s)  After discussing the risks, benefits, and alternatives to the procedure, the patient expressed understanding and wished to proceed  The patient was brought to the procedure suite and placed in the seated position  A procedural pause was conducted to verify:  correct patient identity, procedure to be performed and as applicable, correct side and site, correct patient position, and availability of implants, special equipment or special requirements  A simple surgical tray was used  Prior to the procedure, the bilateral cervical thoracic paraspinal musculature was examined with a 12 MHz linear transducer to visualize the targeted trigger points and determine the optimal needle path  Following this, the area was prepared with a ChloraPrep scrub, then re-examined using the same transducer, a sterile ultrasound transducer cover, and sterile ultrasound transducer gel  Thereafter, using ultrasound guidance, a 2 5-inch 25-gauge needle was advanced into the targeted trigger points  After visualization of the tip and negative aspiration for blood, a mixture of 0 25% bupivacaine with 40 milligrams/milliliter Depo-Medrol was injected into the targeted trigger points  Following the injection, the needle was withdrawn  The patient tolerated the procedure well and there were no apparent complications  After an appropriate amount of observation, the patient was dismissed from the clinic in good condition under their own power

## 2022-05-13 ENCOUNTER — OFFICE VISIT (OUTPATIENT)
Dept: FAMILY MEDICINE CLINIC | Facility: CLINIC | Age: 57
End: 2022-05-13

## 2022-05-13 VITALS
HEIGHT: 60 IN | HEART RATE: 68 BPM | RESPIRATION RATE: 18 BRPM | OXYGEN SATURATION: 97 % | WEIGHT: 160 LBS | BODY MASS INDEX: 31.41 KG/M2 | SYSTOLIC BLOOD PRESSURE: 110 MMHG | DIASTOLIC BLOOD PRESSURE: 66 MMHG | TEMPERATURE: 98 F

## 2022-05-13 DIAGNOSIS — N64.4 BREAST PAIN, LEFT: Primary | ICD-10-CM

## 2022-05-13 DIAGNOSIS — R30.0 DYSURIA: ICD-10-CM

## 2022-05-13 DIAGNOSIS — M54.50 ACUTE RIGHT-SIDED LOW BACK PAIN WITHOUT SCIATICA: ICD-10-CM

## 2022-05-13 DIAGNOSIS — N63.42 SUBAREOLAR MASS OF LEFT BREAST: ICD-10-CM

## 2022-05-13 PROCEDURE — 99214 OFFICE O/P EST MOD 30 MIN: CPT

## 2022-05-13 NOTE — PATIENT INSTRUCTIONS
Call [phone] to schedule the mammogram and ultrasound  This office will call you to discuss the results

## 2022-05-13 NOTE — PROGRESS NOTES
Assessment/Plan:    Breast pain, left  Pt with hx of fluid-filled cyst in left breast with new breast pain x1 week  Palpable density in left breast of unknown etiology in area of pain  No lymphadenopathy noted  - Diagnostic mammogram and US ordered  Acute right-sided low back pain without sciatica  Pt recently treated for UTI symptoms of burning with urination and low back pain  Dysuria has resolved but back pain continues  On exam, CVA tenderness negative bilaterally, tenderness to palpation localized to right lower back only, appears unlikely to be related to urinary symptoms  Offered refills for naproxen and robaxin, patient declined  Dysuria  Burning with urination improved since partial course of treatment with Keflex  Pt believes she still has a UTI due to back pain  Pt could not produce a urine specimen in office  Ordered UA with reflex to culture which pt will attempt to provide at [address] [place] [address] tomorrow  Diagnoses and all orders for this visit:    Breast pain, left  -     Mammo diagnostic bilateral w cad; Future  -     US breast left limited (diagnostic); Future    Subareolar mass of left breast  -     Mammo diagnostic bilateral w cad; Future  -     US breast left limited (diagnostic); Future    Dysuria  -     Cancel: POCT urine dip  -     Cancel: Urine culture  -     UA/M w/rflx Culture, Comp; Future    Acute right-sided low back pain without sciatica  -     Cancel: POCT urine dip  -     Cancel: Urine culture  -     UA/M w/rflx Culture, Comp; Future          Subjective:      Patient ID: Jelani Rodriguez is a 55 y o  female  HPI   Mary Meyer presented to the office today for c/o left breast pain x1 week  Stateless language interpretation utilized for this visit  Reports 5/10 pain is localized to middle of left breast  She has not felt a lump, denied skin changes or discharge from nipple  Pt has not noticed any swollen axillary lymph nodes and denies any systemic symptoms   Pt states she had a fluid-filled cyst in the same breast about 3 years ago that was drained with a needle, current pain is similar to that episode  No treatments tried to relieve pain  Family hx of breast cancer in one paternal aunt  Pt also concerned that she may have an ongoing UTI  She was seen at [facility] ED on 5/3/22 for c/o burning with urination and low back pain  UA positive for leukocytes only, did not reflex to culture  She was prescribed cephalexin 500 mg QID x7 days  Pt stopped medication at day 5 due to stomach upset  Pt no longer having dysuria but continues to have right sided back pain  The following portions of the patient's history were reviewed and updated as appropriate: allergies, current medications, past family history, past medical history, past social history, past surgical history and problem list     Review of Systems   Constitutional: Negative for chills, diaphoresis, fatigue, fever and unexpected weight change  Respiratory: Negative for cough and shortness of breath  Cardiovascular: Negative for chest pain and palpitations  Gastrointestinal: Positive for abdominal pain (upset stomach due to antibiotic)  Negative for nausea and vomiting  Genitourinary: Negative for dysuria (burning with urination resolved)  Musculoskeletal: Positive for back pain (left)  Neurological: Negative for dizziness and headaches  Objective:      /66 (BP Location: Left arm, Patient Position: Sitting, Cuff Size: Adult)   Pulse 68   Temp 98 °F (36 7 °C) (Temporal)   Resp 18   Ht 5' (1 524 m)   Wt 72 6 kg (160 lb)   SpO2 97%   BMI 31 25 kg/m²          Physical Exam  Vitals reviewed  Constitutional:       General: She is not in acute distress  Appearance: She is obese  She is not ill-appearing or diaphoretic  Cardiovascular:      Rate and Rhythm: Normal rate and regular rhythm  Heart sounds: Normal heart sounds     Pulmonary:      Effort: Pulmonary effort is normal       Breath sounds: Normal breath sounds  No wheezing  Chest:   Breasts:      Right: Normal       Left: Mass (approx 1-2 cm, palpable at 10 to 11 o'clock  just outside the border of areola ) and tenderness present  No swelling, bleeding, nipple discharge, skin change, axillary adenopathy or supraclavicular adenopathy  Abdominal:      General: Abdomen is protuberant  Bowel sounds are normal  There is no distension  Palpations: Abdomen is soft  Tenderness: There is no abdominal tenderness  There is no right CVA tenderness or left CVA tenderness  Musculoskeletal:      Thoracic back: No tenderness  Lumbar back: Tenderness present  Back:    Lymphadenopathy:      Upper Body:      Left upper body: No supraclavicular or axillary adenopathy  Skin:     General: Skin is warm and dry  Neurological:      Mental Status: She is alert and oriented to person, place, and time     Psychiatric:         Mood and Affect: Mood and affect normal

## 2022-05-14 NOTE — ASSESSMENT & PLAN NOTE
Pt with hx of fluid-filled cyst in left breast with new breast pain x1 week  Palpable density in left breast of unknown etiology in area of pain  No lymphadenopathy noted  - Diagnostic mammogram and US ordered

## 2022-05-14 NOTE — ASSESSMENT & PLAN NOTE
Burning with urination improved since partial course of treatment with Keflex  Pt believes she still has a UTI due to back pain  Pt could not produce a urine specimen in office  Ordered UA with reflex to culture [facility]

## 2022-05-14 NOTE — ASSESSMENT & PLAN NOTE
Pt recently treated for UTI symptoms of burning with urination and low back pain  Dysuria has resolved but back pain continues  On exam, CVA tenderness negative bilaterally, tenderness to palpation localized to right lower back only, appears unlikely to be related to urinary symptoms  Offered refills for naproxen and robaxin, patient declined

## 2022-05-17 ENCOUNTER — APPOINTMENT (OUTPATIENT)
Dept: LAB | Facility: HOSPITAL | Age: 57
End: 2022-05-17
Payer: COMMERCIAL

## 2022-05-17 DIAGNOSIS — M54.50 ACUTE RIGHT-SIDED LOW BACK PAIN WITHOUT SCIATICA: ICD-10-CM

## 2022-05-17 DIAGNOSIS — R30.0 DYSURIA: ICD-10-CM

## 2022-05-17 LAB
BILIRUB UR QL STRIP: NEGATIVE
CLARITY UR: CLEAR
COLOR UR: NORMAL
GLUCOSE UR STRIP-MCNC: NEGATIVE MG/DL
HGB UR QL STRIP.AUTO: NEGATIVE
KETONES UR STRIP-MCNC: NEGATIVE MG/DL
LEUKOCYTE ESTERASE UR QL STRIP: NEGATIVE
NITRITE UR QL STRIP: NEGATIVE
PH UR STRIP.AUTO: 6.5 [PH]
PROT UR STRIP-MCNC: NEGATIVE MG/DL
SP GR UR STRIP.AUTO: 1.01 (ref 1–1.04)
UROBILINOGEN UA: NEGATIVE MG/DL

## 2022-05-17 PROCEDURE — 81003 URINALYSIS AUTO W/O SCOPE: CPT

## 2022-05-18 ENCOUNTER — TELEPHONE (OUTPATIENT)
Dept: PAIN MEDICINE | Facility: CLINIC | Age: 57
End: 2022-05-18

## 2022-06-17 ENCOUNTER — HOSPITAL ENCOUNTER (OUTPATIENT)
Dept: MAMMOGRAPHY | Facility: CLINIC | Age: 57
Discharge: HOME | End: 2022-06-17
Payer: COMMERCIAL

## 2022-06-17 ENCOUNTER — HOSPITAL ENCOUNTER (OUTPATIENT)
Dept: ULTRASOUND IMAGING | Facility: CLINIC | Age: 57
Discharge: HOME | End: 2022-06-17
Payer: COMMERCIAL

## 2022-06-17 VITALS — HEIGHT: 60 IN | BODY MASS INDEX: 31.41 KG/M2 | WEIGHT: 160 LBS

## 2022-06-17 DIAGNOSIS — N63.42 SUBAREOLAR MASS OF LEFT BREAST: ICD-10-CM

## 2022-06-17 DIAGNOSIS — N64.4 BREAST PAIN, LEFT: ICD-10-CM

## 2022-06-17 PROCEDURE — 76642 ULTRASOUND BREAST LIMITED: CPT

## 2022-06-17 PROCEDURE — 77066 DX MAMMO INCL CAD BI: CPT

## 2022-06-17 PROCEDURE — G0279 TOMOSYNTHESIS, MAMMO: HCPCS

## 2022-10-04 ENCOUNTER — OFFICE VISIT (OUTPATIENT)
Dept: FAMILY MEDICINE CLINIC | Facility: CLINIC | Age: 57
End: 2022-10-04

## 2022-10-04 VITALS
SYSTOLIC BLOOD PRESSURE: 126 MMHG | OXYGEN SATURATION: 98 % | BODY MASS INDEX: 30.96 KG/M2 | RESPIRATION RATE: 16 BRPM | HEIGHT: 61 IN | DIASTOLIC BLOOD PRESSURE: 80 MMHG | WEIGHT: 164 LBS | HEART RATE: 72 BPM | TEMPERATURE: 97.6 F

## 2022-10-04 DIAGNOSIS — Z11.59 ENCOUNTER FOR HEPATITIS C SCREENING TEST FOR LOW RISK PATIENT: ICD-10-CM

## 2022-10-04 DIAGNOSIS — E66.9 OBESITY (BMI 30.0-34.9): ICD-10-CM

## 2022-10-04 DIAGNOSIS — K21.9 GASTROESOPHAGEAL REFLUX DISEASE WITHOUT ESOPHAGITIS: ICD-10-CM

## 2022-10-04 DIAGNOSIS — E66.9 CLASS 1 OBESITY WITHOUT SERIOUS COMORBIDITY WITH BODY MASS INDEX (BMI) OF 31.0 TO 31.9 IN ADULT, UNSPECIFIED OBESITY TYPE: Primary | ICD-10-CM

## 2022-10-04 DIAGNOSIS — Z00.00 ANNUAL PHYSICAL EXAM: ICD-10-CM

## 2022-10-04 DIAGNOSIS — Z11.4 SCREENING FOR HIV (HUMAN IMMUNODEFICIENCY VIRUS): ICD-10-CM

## 2022-10-04 PROBLEM — N64.4 BREAST PAIN, LEFT: Status: RESOLVED | Noted: 2022-05-13 | Resolved: 2022-10-04

## 2022-10-04 PROBLEM — R30.0 DYSURIA: Status: RESOLVED | Noted: 2022-05-13 | Resolved: 2022-10-04

## 2022-10-04 PROCEDURE — 99386 PREV VISIT NEW AGE 40-64: CPT | Performed by: FAMILY MEDICINE

## 2022-10-04 NOTE — PROGRESS NOTES
[address] [facility]    NAME: Sun Bethea  AGE: 65 y o  SEX: female  : 1965     DATE: 10/4/2022     Assessment and Plan:     Problem List Items Addressed This Visit        Digestive    Gastroesophageal reflux disease without esophagitis     -Stable  -Continue PPI              Other    Class 1 obesity without serious comorbidity with body mass index (BMI) of 31 0 to 31 9 in adult - Primary    Relevant Orders    CBC and differential    Comprehensive metabolic panel    Hemoglobin A1C    TSH, 3rd generation with Free T4 reflex    Lipid panel      Other Visit Diagnoses     Screening for HIV (human immunodeficiency virus)        Relevant Orders    HIV 1/2 ANTIGEN/ANTIBODY (4TH GENERATION) W REFLEX [facility]    Encounter for hepatitis C screening test for low risk patient        Relevant Orders    Hepatitis C antibody    Annual physical exam        Obesity (BMI 30 0-34  9)              Immunizations and preventive care screenings were discussed with patient today  Appropriate education was printed on patient's after visit summary  Counseling:  Alcohol/drug use: discussed moderation in alcohol intake, the recommendations for healthy alcohol use, and avoidance of illicit drug use  · Exercise: the importance of regular exercise/physical activity was discussed  Recommend exercise 3-5 times per week for at least 30 minutes  BMI Counseling: Body mass index is 30 99 kg/m²  The BMI is above normal  Nutrition recommendations include decreasing portion sizes, encouraging healthy choices of fruits and vegetables, decreasing fast food intake, consuming healthier snacks and limiting drinks that contain sugar  Exercise recommendations include moderate physical activity 150 minutes/week  Rationale for BMI follow-up plan is due to patient being overweight or obese       Depression Screening and Follow-up Plan: Patient was screened for depression during today's encounter  They screened negative with a PHQ-2 score of 0  Return in 6 months (on 4/4/2023)  Chief Complaint:     Chief Complaint   Patient presents with    Employment Physical      History of Present Illness:     Adult Annual Physical   Patient here for a comprehensive physical exam  The patient reports no problems  Here for physical for work  She has not complaints today  She states medical conditions are stable  She needs form filled out for work  She does not want influenza vaccine  Diet and Physical Activity  · Diet/Nutrition: well balanced diet and consuming 3-5 servings of fruits/vegetables daily  · Exercise: no formal exercise  Depression Screening  PHQ-2/9 Depression Screening    Little interest or pleasure in doing things: 0 - not at all  Feeling down, depressed, or hopeless: 0 - not at all  PHQ-2 Score: 0  PHQ-2 Interpretation: Negative depression screen       General Health  · Sleep: gets 4-6 hours of sleep on average  · Hearing: normal - bilateral   · Vision: wears glasses  · Dental: brushes teeth once daily  /GYN Health  · Patient is: postmenopausal  · Last menstrual period: 15 years ago  · Contraceptive method: hysterectomy  Review of Systems:     Review of Systems   Constitutional: Negative for chills, diaphoresis, fatigue and fever  HENT: Negative for congestion, postnasal drip, rhinorrhea, sinus pressure, sinus pain and sneezing  Eyes: Negative for photophobia, pain, redness and visual disturbance  Respiratory: Negative for cough, choking and shortness of breath  Cardiovascular: Negative for chest pain, palpitations and leg swelling  Gastrointestinal: Negative for abdominal distention, abdominal pain, blood in stool, constipation, diarrhea, nausea and vomiting  Endocrine: Negative for polydipsia, polyphagia and polyuria  Genitourinary: Negative for dysuria, flank pain, frequency and urgency     Musculoskeletal: Negative for arthralgias, back pain and gait problem  Skin: Negative for color change  Neurological: Negative for dizziness, tremors, syncope, facial asymmetry, light-headedness, numbness and headaches  Hematological: Negative for adenopathy  Psychiatric/Behavioral: Negative for agitation  Past Medical History:     No past medical history on file  Past Surgical History:     Past Surgical History:   Procedure Laterality Date    BILATERAL OOPHORECTOMY Bilateral     BREAST BIOPSY Left 2019     SECTION      x2      Social History:     Social History     Socioeconomic History    Marital status: Single     Spouse name: None    Number of children: None    Years of education: None    Highest education level: None   Occupational History    None   Tobacco Use    Smoking status: Never Smoker    Smokeless tobacco: Never Used   Vaping Use    Vaping Use: Never used   Substance and Sexual Activity    Alcohol use: Not Currently    Drug use: Never    Sexual activity: Not Currently     Comment: not    Other Topics Concern    None   Social History Narrative    None     Social Determinants of Health     Financial Resource Strain: Low Risk     Difficulty of Paying Living Expenses: Not hard at all   Food Insecurity: No Food Insecurity    Worried About Running Out of Food in the Last Year: Never true    Jaclyn of Food in the Last Year: Never true   Transportation Needs: No Transportation Needs    Lack of Transportation (Medical): No    Lack of Transportation (Non-Medical):  No   Physical Activity: Not on file   Stress: Not on file   Social Connections: Not on file   Intimate Partner Violence: Not on file   Housing Stability: Not on file      Family History:     Family History   Problem Relation Age of Onset    No Known Problems Mother     Liver cancer Father     Pancreatic cancer Father     No Known Problems Sister     No Known Problems Sister     No Known Problems Sister     No Known Problems Sister     No Known Problems Daughter     No Known Problems Daughter     Vaginal cancer Maternal Grandmother     Uterine cancer Maternal Grandmother     Breast cancer Paternal Aunt       Current Medications:     Current Outpatient Medications   Medication Sig Dispense Refill    Ascorbic Acid (vitamin C) 1000 MG tablet Take 1 tablet (1,000 mg total) by mouth every 12 (twelve) hours 28 tablet 0    Calcium Carb-Cholecalciferol (Oyster Shell Calcium w/D) 500-200 MG-UNIT TABS No       calcium carbonate-vitamin D (OSCAL-D) 500 mg-200 units per tablet Take 1 tablet by mouth daily with breakfast 30 tablet 2    cetirizine (ZyrTEC) 10 mg tablet Take 1 tablet (10 mg total) by mouth daily 30 tablet 1    cholecalciferol (VITAMIN D3) 1,000 units tablet Take 2 tablets (2,000 Units total) by mouth daily 28 tablet 0    fluticasone (FLONASE) 50 mcg/act nasal spray 1 spray into each nostril daily 11 1 mL 1    lidocaine (LIDODERM) 5 % Apply 1 patch topically daily Remove & Discard patch within 12 hours or as directed by MD 30 patch 0    methocarbamol (ROBAXIN) 500 mg tablet Take 1 tablet (500 mg total) by mouth daily at bedtime as needed for muscle spasms 30 tablet 0    Multiple Vitamin (multivitamin) tablet Take 1 tablet by mouth daily 14 tablet 0    naproxen (NAPROSYN) 500 mg tablet Take 1 tablet (500 mg total) by mouth 2 (two) times a day with meals 60 tablet 0    naproxen (Naprosyn) 500 mg tablet Take 1 tablet (500 mg total) by mouth 2 (two) times a day with meals 30 tablet 0    omeprazole (PriLOSEC) 20 mg delayed release capsule Take 1 capsule (20 mg total) by mouth daily 90 capsule 1     No current facility-administered medications for this visit        Allergies:     No Known Allergies   Physical Exam:     /80 (BP Location: Left arm, Patient Position: Sitting, Cuff Size: Standard)   Pulse 72   Temp 97 6 °F (36 4 °C) (Temporal)   Resp 16   Ht 5' 1" (1 549 m)   Wt 74 4 kg (164 lb)   SpO2 98%   Breastfeeding No   BMI 30 99 kg/m²     Physical Exam  Constitutional:       General: She is not in acute distress  Appearance: Normal appearance  She is well-developed  She is not ill-appearing, toxic-appearing or diaphoretic  HENT:      Head: Normocephalic and atraumatic  Right Ear: External ear normal  There is no impacted cerumen  Left Ear: External ear normal  There is no impacted cerumen  Mouth/Throat:      Pharynx: No oropharyngeal exudate or posterior oropharyngeal erythema  Eyes:      General: No scleral icterus  Right eye: No discharge  Left eye: No discharge  Extraocular Movements: Extraocular movements intact  Pupils: Pupils are equal, round, and reactive to light  Cardiovascular:      Rate and Rhythm: Normal rate and regular rhythm  Heart sounds: Normal heart sounds  No murmur heard  No friction rub  No gallop  Pulmonary:      Effort: Pulmonary effort is normal  No respiratory distress  Breath sounds: No stridor  No wheezing or rhonchi  Abdominal:      General: Bowel sounds are normal  There is no distension  Palpations: Abdomen is soft  There is no mass  Tenderness: There is no abdominal tenderness  There is no guarding or rebound  Hernia: No hernia is present  Musculoskeletal:         General: Normal range of motion  Cervical back: Normal range of motion  Right lower leg: No edema  Left lower leg: No edema  Skin:     General: Skin is warm  Capillary Refill: Capillary refill takes less than 2 seconds  Findings: No lesion or rash  Neurological:      General: No focal deficit present  Mental Status: She is alert and oriented to person, place, and time  Cranial Nerves: No cranial nerve deficit  Sensory: No sensory deficit  Motor: No weakness            Edward Crespo MD  [facility]

## 2022-10-04 NOTE — PATIENT INSTRUCTIONS
Wellness Visit for Adults   AMBULATORY CARE:   A wellness visit  is when you see your healthcare provider to get screened for health problems  Your healthcare provider will also give you advice on how to stay healthy  Write down your questions so you remember to ask them  Ask your healthcare provider how often you should have a wellness visit  What happens at a wellness visit:  Your healthcare provider will ask about your health, and your family history of health problems  This includes high blood pressure, heart disease, and cancer  He or she will ask if you have symptoms that concern you, if you smoke, and about your mood  You may also be asked about your intake of medicines, supplements, food, and alcohol  Any of the following may be done:  · Your weight  will be checked  Your height may also be checked so your body mass index (BMI) can be calculated  Your BMI shows if you are at a healthy weight  · Your blood pressure  and heart rate will be checked  Your temperature may also be checked  · Blood and urine tests  may be done  Blood tests may be done to check your cholesterol levels  Abnormal cholesterol levels increase your risk for heart disease and stroke  You may also need a blood or urine test to check for diabetes if you are at increased risk  Urine tests may be done to look for signs of an infection or kidney disease  · A physical exam  includes checking your heartbeat and lungs with a stethoscope  Your healthcare provider may also check your skin to look for sun damage  · Screening tests  may be recommended  A screening test is done to check for diseases that may not cause symptoms  The screening tests you may need depend on your age, gender, family history, and lifestyle habits  For example, colorectal screening may be recommended if you are 48years old or older  Screening tests you need if you are a woman:   · A Pap smear  is used to screen for cervical cancer   Pap smears are usually done every 3 to 5 years depending on your age  You may need them more often if you have had abnormal Pap smear test results in the past  Ask your healthcare provider how often you should have a Pap smear  · A mammogram  is an x-ray of your breasts to screen for breast cancer  Experts recommend mammograms every 2 years starting at age 48 years  You may need a mammogram at age 52 years or younger if you have an increased risk for breast cancer  Talk to your healthcare provider about when you should start having mammograms and how often you need them  Vaccines you may need:   · Get an influenza vaccine  every year  The influenza vaccine protects you from the flu  Several types of viruses cause the flu  The viruses change over time, so new vaccines are made each year  · Get a tetanus-diphtheria (Td) booster vaccine  every 10 years  This vaccine protects you against tetanus and diphtheria  Tetanus is a severe infection that may cause painful muscle spasms and lockjaw  Diphtheria is a severe bacterial infection that causes a thick covering in the back of your mouth and throat  · Get a human papillomavirus (HPV) vaccine  if you are female and aged 23 to 32 or male 23 to 24 and never received it  This vaccine protects you from HPV infection  HPV is the most common infection spread by sexual contact  HPV may also cause vaginal, penile, and anal cancers  · Get a pneumococcal vaccine  if you are aged 72 years or older  The pneumococcal vaccine is an injection given to protect you from pneumococcal disease  Pneumococcal disease is an infection caused by pneumococcal bacteria  The infection may cause pneumonia, meningitis, or an ear infection  · Get a shingles vaccine  if you are 60 or older, even if you have had shingles before  The shingles vaccine is an injection to protect you from the varicella-zoster virus  This is the same virus that causes chickenpox   Shingles is a painful rash that develops in people who had chickenpox or have been exposed to the virus  How to eat healthy:  My Plate is a model for planning healthy meals  It shows the types and amounts of foods that should go on your plate  Fruits and vegetables make up about half of your plate, and grains and protein make up the other half  A serving of dairy is included on the side of your plate  The amount of calories and serving sizes you need depends on your age, gender, weight, and height  Examples of healthy foods are listed below:  · Eat a variety of vegetables  such as dark green, red, and orange vegetables  You can also include canned vegetables low in sodium (salt) and frozen vegetables without added butter or sauces  · Eat a variety of fresh fruits , canned fruit in 100% juice, frozen fruit, and dried fruit  · Include whole grains  At least half of the grains you eat should be whole grains  Examples include whole-wheat bread, wheat pasta, brown rice, and whole-grain cereals such as oatmeal     · Eat a variety of protein foods such as seafood (fish and shellfish), lean meat, and poultry without skin (turkey and chicken)  Examples of lean meats include pork leg, shoulder, or tenderloin, and beef round, sirloin, tenderloin, and extra lean ground beef  Other protein foods include eggs and egg substitutes, beans, peas, soy products, nuts, and seeds  · Choose low-fat dairy products such as skim or 1% milk or low-fat yogurt, cheese, and cottage cheese  · Limit unhealthy fats  such as butter, hard margarine, and shortening  Exercise:  Exercise at least 30 minutes per day on most days of the week  Some examples of exercise include walking, biking, dancing, and swimming  You can also fit in more physical activity by taking the stairs instead of the elevator or parking farther away from stores  Include muscle strengthening activities 2 days each week  Regular exercise provides many health benefits   It helps you manage your weight, and decreases your risk for type 2 diabetes, heart disease, stroke, and high blood pressure  Exercise can also help improve your mood  Ask your healthcare provider about the best exercise plan for you  General health and safety guidelines:   · Do not smoke  Nicotine and other chemicals in cigarettes and cigars can cause lung damage  Ask your healthcare provider for information if you currently smoke and need help to quit  E-cigarettes or smokeless tobacco still contain nicotine  Talk to your healthcare provider before you use these products  · Limit alcohol  A drink of alcohol is 12 ounces of beer, 5 ounces of wine, or 1½ ounces of liquor  · Lose weight, if needed  Being overweight increases your risk of certain health conditions  These include heart disease, high blood pressure, type 2 diabetes, and certain types of cancer  · Protect your skin  Do not sunbathe or use tanning beds  Use sunscreen with a SPF 15 or higher  Apply sunscreen at least 15 minutes before you go outside  Reapply sunscreen every 2 hours  Wear protective clothing, hats, and sunglasses when you are outside  · Drive safely  Always wear your seatbelt  Make sure everyone in your car wears a seatbelt  A seatbelt can save your life if you are in an accident  Do not use your cell phone when you are driving  This could distract you and cause an accident  Pull over if you need to make a call or send a text message  · Practice safe sex  Use latex condoms if are sexually active and have more than one partner  Your healthcare provider may recommend screening tests for sexually transmitted infections (STIs)  · Wear helmets, lifejackets, and protective gear  Always wear a helmet when you ride a bike or motorcycle, go skiing, or play sports that could cause a head injury  Wear protective equipment when you play sports  Wear a lifejacket when you are on a boat or doing water sports      © Copyright [employer] 2022 Information is for End User's use only and may not be sold, redistributed or otherwise used for commercial purposes  All illustrations and images included in CareNotes® are the copyrighted property of [employer] A M [employer]  or Deonte Peres  The above information is an  only  It is not intended as medical advice for individual conditions or treatments  Talk to your doctor, nurse or pharmacist before following any medical regimen to see if it is safe and effective for you  Weight Management   AMBULATORY CARE:   Why it is important to manage your weight:  Being overweight increases your risk of health conditions such as heart disease, high blood pressure, type 2 diabetes, and certain types of cancer  It can also increase your risk for osteoarthritis, sleep apnea, and other respiratory problems  Aim for a slow, steady weight loss  Even a small amount of weight loss can lower your risk of health problems  Risks of being overweight:  Extra weight can cause many health problems, including the following:  · Diabetes (high blood sugar level)    · High blood pressure or high cholesterol    · Heart disease    · Stroke    · Gallbladder or liver disease    · Cancer of the colon, breast, prostate, liver, or kidney    · Sleep apnea    · Arthritis or gout    Screening  is done to check for health conditions before you have signs or symptoms  If you are 28to 79years old, your blood sugar level may be checked every 3 years for signs of prediabetes or diabetes  Your healthcare provider will check your blood pressure at each visit  High blood pressure can lead to a stroke or other problems  Your provider may check for signs of heart disease, cancer, or other health problems  How to lose weight safely:  A safe and healthy way to lose weight is to eat fewer calories and get regular exercise  · You can lose up about 1 pound a week by decreasing the number of calories you eat by 500 calories each day   You can decrease calories by eating smaller portion sizes or by cutting out high-calorie foods  Read labels to find out how many calories are in the foods you eat  · You can also burn calories with exercise such as walking, swimming, or biking  You will be more likely to keep weight off if you make these changes part of your lifestyle  Exercise at least 30 minutes per day on most days of the week  You can also fit in more physical activity by taking the stairs instead of the elevator or parking farther away from stores  Ask your healthcare provider about the best exercise plan for you  Healthy meal plan for weight management:  A healthy meal plan includes a variety of foods, contains fewer calories, and helps you stay healthy  A healthy meal plan includes the following:     · Eat whole-grain foods more often  A healthy meal plan should contain fiber  Fiber is the part of grains, fruits, and vegetables that is not broken down by your body  Whole-grain foods are healthy and provide extra fiber in your diet  Some examples of whole-grain foods are whole-wheat breads and pastas, oatmeal, brown rice, and bulgur  · Eat a variety of vegetables every day  Include dark, leafy greens such as spinach, kale, tiffanie greens, and mustard greens  Eat yellow and orange vegetables such as carrots, sweet potatoes, and winter squash  · Eat a variety of fruits every day  Choose fresh or canned fruit (canned in its own juice or light syrup) instead of juice  Fruit juice has very little or no fiber  · Eat low-fat dairy foods  Drink fat-free (skim) milk or 1% milk  Eat fat-free yogurt and low-fat cottage cheese  Try low-fat cheeses such as mozzarella and other reduced-fat cheeses  · Choose meat and other protein foods that are low in fat  Choose beans or other legumes such as split peas or lentils  Choose fish, skinless poultry (chicken or turkey), or lean cuts of red meat (beef or pork)   Before you cook meat or poultry, cut off any visible fat  · Use less fat and oil  Try baking foods instead of frying them  Add less fat, such as margarine, sour cream, regular salad dressing and mayonnaise to foods  Eat fewer high-fat foods  Some examples of high-fat foods include french fries, doughnuts, ice cream, and cakes  · Eat fewer sweets  Limit foods and drinks that are high in sugar  This includes candy, cookies, regular soda, and sweetened drinks  Ways to decrease calories:   · Eat smaller portions  ? Use a small plate with smaller servings  ? Do not eat second helpings  ? When you eat at a restaurant, ask for a box and place half of your meal in the box before you eat  ? Share an entrée with someone else  · Replace high-calorie snacks with healthy, low-calorie snacks  ? Choose fresh fruit, vegetables, fat-free rice cakes, or air-popped popcorn instead of potato chips, nuts, or chocolate  ? Choose water or calorie-free drinks instead of soda or sweetened drinks  · Do not shop for groceries when you are hungry  You may be more likely to make unhealthy food choices  Take a grocery list of healthy foods and shop after you have eaten  · Eat regular meals  Do not skip meals  Skipping meals can lead to overeating later in the day  This can make it harder for you to lose weight  Eat a healthy snack in place of a meal if you do not have time to eat a regular meal  Talk with a dietitian to help you create a meal plan and schedule that is right for you  Other things to consider as you try to lose weight:   · Be aware of situations that may give you the urge to overeat, such as eating while watching television  Find ways to avoid these situations  For example, read a book, go for a walk, or do crafts  · Meet with a weight loss support group or friends who are also trying to lose weight  This may help you stay motivated to continue working on your weight loss goals      © Copyright [employer] 2022 Information is for End User's use only and may not be sold, redistributed or otherwise used for commercial purposes  All illustrations and images included in CareNotes® are the copyrighted property of A D A M , Inc  or Deonte Peres  The above information is an  only  It is not intended as medical advice for individual conditions or treatments  Talk to your doctor, nurse or pharmacist before following any medical regimen to see if it is safe and effective for you  Obesity   AMBULATORY CARE:   Obesity  means your body mass index (BMI) is greater than 30  Your healthcare provider will use your height and weight to measure your BMI  The risks of obesity include  many health problems, including injuries or physical disability  · Diabetes (high blood sugar level)    · High blood pressure or high cholesterol    · Heart disease    · Stroke    · Gallbladder or liver disease    · Cancer of the colon, breast, prostate, liver, or kidney    · Sleep apnea    · Arthritis or gout    Screening  is done to check for health conditions before you have signs or symptoms  If you are 28to 79years old, your blood sugar level may be checked every 3 years for signs of prediabetes or diabetes  Your healthcare provider will check your blood pressure at each visit  High blood pressure can lead to a stroke or other problems  Your provider may check for signs of heart disease, cancer, or other health problems  Seek care immediately if:   · You have a severe headache, confusion, or difficulty speaking  · You have weakness on one side of your body  · You have chest pain, sweating, or shortness of breath  Call your doctor if:   · You have symptoms of gallbladder or liver disease, such as pain in your upper abdomen  · You have knee or hip pain and discomfort while walking  · You have symptoms of diabetes, such as intense hunger and thirst, and frequent urination      · You have symptoms of sleep apnea, such as snoring or daytime sleepiness  · You have questions or concerns about your condition or care  Treatment for obesity  focuses on helping you lose weight to improve your health  Even a small decrease in BMI can reduce the risk for many health problems  Your healthcare provider will help you set a weight-loss goal   · Lifestyle changes  are the first step in treating obesity  These include making healthy food choices and getting regular physical activity  Your healthcare provider may suggest a weight-loss program that involves coaching, education, and therapy  · Medicine  may help you lose weight when it is used with a healthy foods and physical activity  · Surgery  can help you lose weight if you are very obese and have other health problems  There are several types of weight-loss surgery  Ask your healthcare provider for more information  Tips for safe weight loss:   · Set small, realistic goals  An example of a small goal is to walk for 20 minutes 5 days a week  Anther goal is to lose 5% of your body weight  · Tell friends, family members, and coworkers about your goals  and ask for their support  Ask a friend to lose weight with you, or join a weight-loss support group  · Identify foods or triggers that may cause you to overeat , and find ways to avoid them  Remove tempting high-calorie foods from your home and workplace  Place a bowl of fresh fruit on your kitchen counter  If stress causes you to eat, then find other ways to cope with stress  A counselor or therapist may be able to help you  · Keep a diary to track what you eat and drink  Also write down how many minutes of physical activity you do each day  Weigh yourself once a week and record it in your diary  Eating changes: You will need to eat 500 to 1,000 fewer calories each day than you currently eat to lose 1 to 2 pounds a week  The following changes will help you cut calories:  · Eat smaller portions    Use small plates, no larger than 9 inches in diameter  Fill your plate half full of fruits and vegetables  Measure your food using measuring cups until you know what a serving size looks like  · Eat 3 meals and 1 or 2 snacks each day  Plan your meals in advance  Logan Echeverria and eat at home most of the time  Eat slowly  Do not skip meals  Skipping meals can lead to overeating later in the day  This can make it harder for you to lose weight  Talk with a dietitian to help you make a meal plan and schedule that is right for you  · Eat fruits and vegetables at every meal   They are low in calories and high in fiber, which makes you feel full  Do not add butter, margarine, or cream sauce to vegetables  Use herbs to season steamed vegetables  · Eat less fat and fewer fried foods  Eat more baked or grilled chicken and fish  These protein sources are lower in calories and fat than red meat  Limit fast food  Dress your salads with olive oil and vinegar instead of bottled dressing  · Limit the amount of sugar you eat  Do not drink sugary beverages  Limit alcohol  Activity changes:  Physical activity is good for your body in many ways  It helps you burn calories and build strong muscles  It decreases stress and depression, and improves your mood  It can also help you sleep better  Talk to your healthcare provider before you begin an exercise program   · Exercise for at least 30 minutes 5 days a week  Start slowly  Set aside time each day for physical activity that you enjoy and that is convenient for you  It is best to do both weight training and an activity that increases your heart rate, such as walking, bicycling, or swimming  · Find ways to be more active  Do yard work and housecleaning  Walk up the stairs instead of using elevators  Spend your leisure time going to events that require walking, such as outdoor festivals or fairs  This extra physical activity can help you lose weight and keep it off         Follow up with your doctor as directed: You may need to meet with a dietitian  Write down your questions so you remember to ask them during your visits  © Copyright [employer] 2022 Information is for End User's use only and may not be sold, redistributed or otherwise used for commercial purposes  All illustrations and images included in CareNotes® are the copyrighted property of A [employer] A M , Inc  or [facility]   The above information is an  only  It is not intended as medical advice for individual conditions or treatments  Talk to your doctor, nurse or pharmacist before following any medical regimen to see if it is safe and effective for you

## 2022-10-18 ENCOUNTER — APPOINTMENT (OUTPATIENT)
Dept: LAB | Facility: HOSPITAL | Age: 57
End: 2022-10-18
Attending: FAMILY MEDICINE
Payer: COMMERCIAL

## 2022-10-18 DIAGNOSIS — E66.9 CLASS 1 OBESITY WITHOUT SERIOUS COMORBIDITY WITH BODY MASS INDEX (BMI) OF 31.0 TO 31.9 IN ADULT, UNSPECIFIED OBESITY TYPE: ICD-10-CM

## 2022-10-18 DIAGNOSIS — Z11.4 SCREENING FOR HIV (HUMAN IMMUNODEFICIENCY VIRUS): ICD-10-CM

## 2022-10-18 DIAGNOSIS — Z11.59 ENCOUNTER FOR HEPATITIS C SCREENING TEST FOR LOW RISK PATIENT: ICD-10-CM

## 2022-10-18 LAB
ALBUMIN SERPL BCP-MCNC: 4.2 G/DL (ref 3.5–5)
ALP SERPL-CCNC: 78 U/L (ref 43–122)
ALT SERPL W P-5'-P-CCNC: 30 U/L
ANION GAP SERPL CALCULATED.3IONS-SCNC: 7 MMOL/L (ref 5–14)
AST SERPL W P-5'-P-CCNC: 29 U/L (ref 14–36)
BASOPHILS # BLD AUTO: 0.05 THOUSANDS/ΜL (ref 0–0.1)
BASOPHILS NFR BLD AUTO: 1 % (ref 0–1)
BILIRUB SERPL-MCNC: 0.34 MG/DL (ref 0.2–1)
BUN SERPL-MCNC: 17 MG/DL (ref 5–25)
CALCIUM SERPL-MCNC: 8.6 MG/DL (ref 8.4–10.2)
CHLORIDE SERPL-SCNC: 105 MMOL/L (ref 96–108)
CHOLEST SERPL-MCNC: 235 MG/DL
CO2 SERPL-SCNC: 28 MMOL/L (ref 21–32)
CREAT SERPL-MCNC: 0.76 MG/DL (ref 0.6–1.2)
EOSINOPHIL # BLD AUTO: 0.46 THOUSAND/ΜL (ref 0–0.61)
EOSINOPHIL NFR BLD AUTO: 8 % (ref 0–6)
ERYTHROCYTE [DISTWIDTH] IN BLOOD BY AUTOMATED COUNT: 14.2 % (ref 11.6–15.1)
EST. AVERAGE GLUCOSE BLD GHB EST-MCNC: 126 MG/DL
GFR SERPL CREATININE-BSD FRML MDRD: 87 ML/MIN/1.73SQ M
GLUCOSE P FAST SERPL-MCNC: 105 MG/DL (ref 70–99)
HBA1C MFR BLD: 6 %
HCT VFR BLD AUTO: 42.7 % (ref 34.8–46.1)
HCV AB SER QL: NORMAL
HDLC SERPL-MCNC: 52 MG/DL
HGB BLD-MCNC: 13.1 G/DL (ref 11.5–15.4)
IMM GRANULOCYTES # BLD AUTO: 0.01 THOUSAND/UL (ref 0–0.2)
IMM GRANULOCYTES NFR BLD AUTO: 0 % (ref 0–2)
LDLC SERPL CALC-MCNC: 161 MG/DL
LYMPHOCYTES # BLD AUTO: 2.66 THOUSANDS/ΜL (ref 0.6–4.47)
LYMPHOCYTES NFR BLD AUTO: 50 % (ref 14–44)
MCH RBC QN AUTO: 25.9 PG (ref 26.8–34.3)
MCHC RBC AUTO-ENTMCNC: 30.7 G/DL (ref 31.4–37.4)
MCV RBC AUTO: 85 FL (ref 82–98)
MONOCYTES # BLD AUTO: 0.46 THOUSAND/ΜL (ref 0.17–1.22)
MONOCYTES NFR BLD AUTO: 8 % (ref 4–12)
NEUTROPHILS # BLD AUTO: 1.82 THOUSANDS/ΜL (ref 1.85–7.62)
NEUTS SEG NFR BLD AUTO: 33 % (ref 43–75)
NONHDLC SERPL-MCNC: 183 MG/DL
NRBC BLD AUTO-RTO: 0 /100 WBCS
PLATELET # BLD AUTO: 309 THOUSANDS/UL (ref 149–390)
PMV BLD AUTO: 10.9 FL (ref 8.9–12.7)
POTASSIUM SERPL-SCNC: 4.2 MMOL/L (ref 3.5–5.3)
PROT SERPL-MCNC: 7.6 G/DL (ref 6.4–8.4)
RBC # BLD AUTO: 5.05 MILLION/UL (ref 3.81–5.12)
SODIUM SERPL-SCNC: 140 MMOL/L (ref 135–147)
TRIGL SERPL-MCNC: 108 MG/DL
TSH SERPL DL<=0.05 MIU/L-ACNC: 0.77 UIU/ML (ref 0.45–4.5)
WBC # BLD AUTO: 5.46 THOUSAND/UL (ref 4.31–10.16)

## 2022-10-18 PROCEDURE — 83036 HEMOGLOBIN GLYCOSYLATED A1C: CPT

## 2022-10-18 PROCEDURE — 80061 LIPID PANEL: CPT

## 2022-10-18 PROCEDURE — 85025 COMPLETE CBC W/AUTO DIFF WBC: CPT

## 2022-10-18 PROCEDURE — 87389 HIV-1 AG W/HIV-1&-2 AB AG IA: CPT

## 2022-10-18 PROCEDURE — 80053 COMPREHEN METABOLIC PANEL: CPT

## 2022-10-18 PROCEDURE — 36415 COLL VENOUS BLD VENIPUNCTURE: CPT

## 2022-10-18 PROCEDURE — 84443 ASSAY THYROID STIM HORMONE: CPT

## 2022-10-18 PROCEDURE — 86803 HEPATITIS C AB TEST: CPT

## 2022-10-19 ENCOUNTER — TELEPHONE (OUTPATIENT)
Dept: FAMILY MEDICINE CLINIC | Facility: CLINIC | Age: 57
End: 2022-10-19

## 2022-10-19 LAB — HIV 1+2 AB+HIV1 P24 AG SERPL QL IA: NORMAL

## 2022-10-19 NOTE — TELEPHONE ENCOUNTER
----- Message from Sandee De Los Santos MD sent at 10/19/2022  9:43 AM EDT -----  Please have her make an appointment to review lab results   Thank you

## 2022-10-24 ENCOUNTER — OFFICE VISIT (OUTPATIENT)
Dept: FAMILY MEDICINE CLINIC | Facility: CLINIC | Age: 57
End: 2022-10-24

## 2022-10-24 VITALS
RESPIRATION RATE: 18 BRPM | BODY MASS INDEX: 31.34 KG/M2 | WEIGHT: 166 LBS | SYSTOLIC BLOOD PRESSURE: 126 MMHG | TEMPERATURE: 97.5 F | HEART RATE: 86 BPM | DIASTOLIC BLOOD PRESSURE: 74 MMHG | OXYGEN SATURATION: 98 % | HEIGHT: 61 IN

## 2022-10-24 DIAGNOSIS — M25.511 CHRONIC PAIN OF BOTH SHOULDERS: ICD-10-CM

## 2022-10-24 DIAGNOSIS — M54.2 NECK PAIN: ICD-10-CM

## 2022-10-24 DIAGNOSIS — K21.9 GASTROESOPHAGEAL REFLUX DISEASE WITHOUT ESOPHAGITIS: ICD-10-CM

## 2022-10-24 DIAGNOSIS — R73.03 PRE-DIABETES: Primary | ICD-10-CM

## 2022-10-24 DIAGNOSIS — M54.50 CHRONIC LOW BACK PAIN, UNSPECIFIED BACK PAIN LATERALITY, UNSPECIFIED WHETHER SCIATICA PRESENT: ICD-10-CM

## 2022-10-24 DIAGNOSIS — E78.5 HYPERLIPIDEMIA, UNSPECIFIED HYPERLIPIDEMIA TYPE: ICD-10-CM

## 2022-10-24 DIAGNOSIS — M25.512 CHRONIC PAIN OF BOTH SHOULDERS: ICD-10-CM

## 2022-10-24 DIAGNOSIS — G89.29 CHRONIC PAIN OF BOTH SHOULDERS: ICD-10-CM

## 2022-10-24 DIAGNOSIS — G89.29 CHRONIC LOW BACK PAIN, UNSPECIFIED BACK PAIN LATERALITY, UNSPECIFIED WHETHER SCIATICA PRESENT: ICD-10-CM

## 2022-10-24 PROCEDURE — 99214 OFFICE O/P EST MOD 30 MIN: CPT | Performed by: FAMILY MEDICINE

## 2022-10-24 RX ORDER — LIDOCAINE 50 MG/G
1 PATCH TOPICAL DAILY
Qty: 30 PATCH | Refills: 0 | Status: SHIPPED | OUTPATIENT
Start: 2022-10-24

## 2022-10-24 RX ORDER — METHOCARBAMOL 500 MG/1
500 TABLET, FILM COATED ORAL
Qty: 30 TABLET | Refills: 0 | Status: SHIPPED | OUTPATIENT
Start: 2022-10-24

## 2022-10-24 RX ORDER — OMEPRAZOLE 20 MG/1
20 CAPSULE, DELAYED RELEASE ORAL DAILY
Qty: 90 CAPSULE | Refills: 1 | Status: SHIPPED | OUTPATIENT
Start: 2022-10-24

## 2022-10-24 NOTE — ASSESSMENT & PLAN NOTE
-No Red flags  -Most recent MRI showed Mild spondylotic changes of the lumbar spine   -Medication refill provided  -Recommend follow up with pain management and orthopedic

## 2022-10-24 NOTE — ASSESSMENT & PLAN NOTE
Most recent LDL of 161 and total cholesterol of 235  -  Dietary counseling and exercise instructions provided

## 2022-10-24 NOTE — PROGRESS NOTES
Name: Jaylyn Luna      : 1965      MRN: [id number]  Encounter Provider: Aida Min MD  Encounter Date: 10/24/2022   Encounter department: [address] [place] [address]     1  Pre-diabetes  Assessment & Plan:  Most recent HBA1C of 6  Shared decision to initiate metformin  Dietary and exercise counseling provided    Orders:  -     metFORMIN (GLUCOPHAGE) 500 mg tablet; Take 1 tablet (500 mg total) by mouth daily with breakfast    2  Hyperlipidemia, unspecified hyperlipidemia type  Assessment & Plan:  Most recent LDL of 161 and total cholesterol of 235  -  Dietary counseling and exercise instructions provided      3  Gastroesophageal reflux disease without esophagitis  -     omeprazole (PriLOSEC) 20 mg delayed release capsule; Take 1 capsule (20 mg total) by mouth daily    4  Chronic low back pain, unspecified back pain laterality, unspecified whether sciatica present  Assessment & Plan:  -No Red flags  -Most recent MRI showed Mild spondylotic changes of the lumbar spine   -Medication refill provided  -Recommend follow up with pain management and orthopedic     Orders:  -     lidocaine (LIDODERM) 5 %; Apply 1 patch topically daily Remove & Discard patch within 12 hours or as directed by MD    5  Neck pain  -     lidocaine (LIDODERM) 5 %; Apply 1 patch topically daily Remove & Discard patch within 12 hours or as directed by MD    6  Chronic pain of both shoulders  -     methocarbamol (ROBAXIN) 500 mg tablet; Take 1 tablet (500 mg total) by mouth daily at bedtime as needed for muscle spasms         Subjective       69-year-old female with a history of elevated BMI who presents today to review her recent labs  Her labs shows prediabetes and she is interested in starting a medication to bring down her blood sugar  She is trying to work on a healthier diet  She also has a history of history of chronic low back pain   She denies any bowel or bladder incontinence  She is requesting refills on her muscle relaxant and lidocaine patch    Review of Systems   Constitutional: Negative for chills, diaphoresis, fatigue and fever  Eyes: Negative for visual disturbance  Respiratory: Negative for cough, shortness of breath and wheezing  Cardiovascular: Negative for chest pain and leg swelling  Gastrointestinal: Negative for abdominal pain, blood in stool, constipation, diarrhea, nausea and vomiting  Genitourinary: Negative for dysuria, hematuria and urgency  Musculoskeletal: Positive for back pain  Negative for gait problem, joint swelling and myalgias  Skin: Negative for rash  Neurological: Negative for syncope, weakness, numbness and headaches         Current Outpatient Medications on File Prior to Visit   Medication Sig   • Ascorbic Acid (vitamin C) 1000 MG tablet Take 1 tablet (1,000 mg total) by mouth every 12 (twelve) hours   • Calcium Carb-Cholecalciferol (Oyster Shell Calcium w/D) 500-200 MG-UNIT TABS No    • calcium carbonate-vitamin D (OSCAL-D) 500 mg-200 units per tablet Take 1 tablet by mouth daily with breakfast   • cetirizine (ZyrTEC) 10 mg tablet Take 1 tablet (10 mg total) by mouth daily   • cholecalciferol (VITAMIN D3) 1,000 units tablet Take 2 tablets (2,000 Units total) by mouth daily   • fluticasone (FLONASE) 50 mcg/act nasal spray 1 spray into each nostril daily   • Multiple Vitamin (multivitamin) tablet Take 1 tablet by mouth daily   • naproxen (NAPROSYN) 500 mg tablet Take 1 tablet (500 mg total) by mouth 2 (two) times a day with meals   • naproxen (Naprosyn) 500 mg tablet Take 1 tablet (500 mg total) by mouth 2 (two) times a day with meals   • [DISCONTINUED] lidocaine (LIDODERM) 5 % Apply 1 patch topically daily Remove & Discard patch within 12 hours or as directed by MD   • [DISCONTINUED] methocarbamol (ROBAXIN) 500 mg tablet Take 1 tablet (500 mg total) by mouth daily at bedtime as needed for muscle spasms   • [DISCONTINUED] omeprazole (PriLOSEC) 20 mg delayed release capsule Take 1 capsule (20 mg total) by mouth daily       Objective     /74 (BP Location: Left arm, Patient Position: Sitting, Cuff Size: Adult)   Pulse 86   Temp 97 5 °F (36 4 °C) (Temporal)   Resp 18   Ht 5' 1" (1 549 m)   Wt 75 3 kg (166 lb)   SpO2 98%   BMI 31 37 kg/m²     Physical Exam  Vitals and nursing note reviewed  Constitutional:       General: She is not in acute distress  Appearance: Normal appearance  She is well-developed  She is not ill-appearing, toxic-appearing or diaphoretic  HENT:      Head: Normocephalic and atraumatic  Right Ear: External ear normal       Left Ear: External ear normal       Nose: Nose normal       Mouth/Throat:      Mouth: Mucous membranes are moist       Pharynx: No oropharyngeal exudate or posterior oropharyngeal erythema  Eyes:      General: No scleral icterus  Right eye: No discharge  Left eye: No discharge  Extraocular Movements: Extraocular movements intact  Conjunctiva/sclera: Conjunctivae normal    Neck:      Thyroid: No thyromegaly  Vascular: No JVD  Trachea: No tracheal deviation  Cardiovascular:      Rate and Rhythm: Normal rate and regular rhythm  Pulses:           Dorsalis pedis pulses are 2+ on the right side and 2+ on the left side  Posterior tibial pulses are 2+ on the right side and 2+ on the left side  Heart sounds: Normal heart sounds  No murmur heard  No friction rub  No gallop  Pulmonary:      Effort: Pulmonary effort is normal  No respiratory distress  Breath sounds: Normal breath sounds  No stridor  No wheezing or rhonchi  Abdominal:      General: Bowel sounds are normal  There is no distension  Palpations: Abdomen is soft  There is no mass  Tenderness: There is no abdominal tenderness  There is no guarding or rebound  Hernia: No hernia is present     Musculoskeletal:         General: Normal range of motion  Cervical back: Normal range of motion and neck supple  Right lower leg: No edema  Left lower leg: No edema  Feet:      Right foot:      Skin integrity: No ulcer, skin breakdown, erythema, warmth, callus or dry skin  Left foot:      Skin integrity: No ulcer, skin breakdown, erythema, warmth, callus or dry skin  Skin:     General: Skin is warm  Capillary Refill: Capillary refill takes less than 2 seconds  Findings: No rash  Neurological:      General: No focal deficit present  Mental Status: She is alert and oriented to person, place, and time  Mental status is at baseline  Motor: No weakness or abnormal muscle tone        Gait: Gait normal    Psychiatric:         Mood and Affect: Mood normal          Behavior: Behavior normal        Destiney Donnelly MD

## 2022-10-24 NOTE — ASSESSMENT & PLAN NOTE
Most recent HBA1C of 6  Shared decision to initiate metformin  Dietary and exercise counseling provided

## 2022-10-25 DIAGNOSIS — J02.9 SORE THROAT: Primary | ICD-10-CM

## 2022-11-01 ENCOUNTER — TELEPHONE (OUTPATIENT)
Dept: FAMILY MEDICINE CLINIC | Facility: CLINIC | Age: 57
End: 2022-11-01

## 2022-11-01 DIAGNOSIS — J02.9 SORE THROAT: Primary | ICD-10-CM

## 2022-11-01 RX ORDER — PREDNISONE 20 MG/1
20 TABLET ORAL DAILY
Qty: 5 TABLET | Refills: 0 | Status: SHIPPED | OUTPATIENT
Start: 2022-11-01 | End: 2022-11-06

## 2022-11-01 NOTE — TELEPHONE ENCOUNTER
Pt came in requesting a stronger medication for her throat, stated that the one that was giving to her last time is not working, and also is requesting a referral for a specialist for her throat   Please advise

## 2022-12-13 ENCOUNTER — HOSPITAL ENCOUNTER (EMERGENCY)
Facility: HOSPITAL | Age: 57
Discharge: HOME | End: 2022-12-13
Attending: EMERGENCY MEDICINE

## 2022-12-13 ENCOUNTER — APPOINTMENT (EMERGENCY)
Dept: RADIOLOGY | Facility: HOSPITAL | Age: 57
End: 2022-12-13

## 2022-12-13 ENCOUNTER — TELEPHONE (OUTPATIENT)
Dept: OBGYN CLINIC | Facility: CLINIC | Age: 57
End: 2022-12-13

## 2022-12-13 VITALS
DIASTOLIC BLOOD PRESSURE: 46 MMHG | RESPIRATION RATE: 20 BRPM | WEIGHT: 162.3 LBS | HEART RATE: 65 BPM | SYSTOLIC BLOOD PRESSURE: 131 MMHG | BODY MASS INDEX: 30.67 KG/M2 | OXYGEN SATURATION: 97 % | TEMPERATURE: 97.2 F

## 2022-12-13 DIAGNOSIS — G89.29 CHRONIC PAIN IN RIGHT FOOT: Primary | ICD-10-CM

## 2022-12-13 DIAGNOSIS — M79.671 CHRONIC PAIN IN RIGHT FOOT: Primary | ICD-10-CM

## 2022-12-13 RX ORDER — ACETAMINOPHEN 500 MG
500 TABLET ORAL EVERY 6 HOURS PRN
Qty: 30 TABLET | Refills: 0 | Status: SHIPPED | OUTPATIENT
Start: 2022-12-13

## 2022-12-13 NOTE — TELEPHONE ENCOUNTER
Caller: Patient     Doctor: n/a     Reason for call: calling to make an appointment and  used but call disconnected 3    Call back#: n/a

## 2022-12-13 NOTE — ED PROVIDER NOTES
History  Chief Complaint   Patient presents with   • Foot Pain     R foot pain on the top, started about 3 weeks ago  No pta medications  25-year-old female presents for evaluation of right foot pain  Patient reports she has been having right foot pain over the past year  Patient denies any direct traumatic inciting events, rashes, skin changes, swelling to the foot  Patient denies any numbness or tingling, weakness or paresthesias or paralysis  Patient reports her pain gets worse in the cold and when it rains  Patient denies any alleviating factors and reports has not taken any medications alleviate her symptoms  Patient denies any knee or leg pain  Patient reports her pain has gotten worse over the past 3 weeks as it is started to get cold again  Patient reports an aching throbbing sensation which does not radiate  Patient reports intermittent pain with a gradual onset over a year ago          Prior to Admission Medications   Prescriptions Last Dose Informant Patient Reported? Taking?    Ascorbic Acid (vitamin C) 1000 MG tablet   No No   Sig: Take 1 tablet (1,000 mg total) by mouth every 12 (twelve) hours   Calcium Carb-Cholecalciferol (Oyster Shell Calcium w/D) 500-200 MG-UNIT TABS No   Yes No   Multiple Vitamin (multivitamin) tablet   No No   Sig: Take 1 tablet by mouth daily   al mag oxide-diphenhydramine-lidocaine viscous (MAGIC MOUTHWASH) 1:1:1 suspension   No No   Sig: Swish and spit 10 mL every 4 (four) hours as needed for mouth pain or discomfort   calcium carbonate-vitamin D (OSCAL-D) 500 mg-200 units per tablet   No No   Sig: Take 1 tablet by mouth daily with breakfast   cetirizine (ZyrTEC) 10 mg tablet   No No   Sig: Take 1 tablet (10 mg total) by mouth daily   cholecalciferol (VITAMIN D3) 1,000 units tablet   No No   Sig: Take 2 tablets (2,000 Units total) by mouth daily   fluticasone (FLONASE) 50 mcg/act nasal spray   No No   Si spray into each nostril daily   lidocaine (LIDODERM) 5 % No No   Sig: Apply 1 patch topically daily Remove & Discard patch within 12 hours or as directed by MD   menthol-cetylpyridinium (CEPACOL) 3 MG lozenge   No No   Sig: Take 1 lozenge (3 mg total) by mouth as needed for sore throat   metFORMIN (GLUCOPHAGE) 500 mg tablet   No No   Sig: Take 1 tablet (500 mg total) by mouth daily with breakfast   methocarbamol (ROBAXIN) 500 mg tablet   No No   Sig: Take 1 tablet (500 mg total) by mouth daily at bedtime as needed for muscle spasms   naproxen (NAPROSYN) 500 mg tablet   No No   Sig: Take 1 tablet (500 mg total) by mouth 2 (two) times a day with meals   naproxen (Naprosyn) 500 mg tablet   No No   Sig: Take 1 tablet (500 mg total) by mouth 2 (two) times a day with meals   omeprazole (PriLOSEC) 20 mg delayed release capsule   No No   Sig: Take 1 capsule (20 mg total) by mouth daily      Facility-Administered Medications: None       History reviewed  No pertinent past medical history  Past Surgical History:   Procedure Laterality Date   • BILATERAL OOPHORECTOMY Bilateral    • BREAST BIOPSY Left 2019   •  SECTION      x2       Family History   Problem Relation Age of Onset   • No Known Problems Mother    • Liver cancer Father    • Pancreatic cancer Father    • No Known Problems Sister    • No Known Problems Sister    • No Known Problems Sister    • No Known Problems Sister    • No Known Problems Daughter    • No Known Problems Daughter    • Vaginal cancer Maternal Grandmother    • Uterine cancer Maternal Grandmother    • Breast cancer Paternal Aunt      I have reviewed and agree with the history as documented      E-Cigarette/Vaping   • E-Cigarette Use Never User      E-Cigarette/Vaping Substances   • Nicotine No    • THC No    • CBD No    • Flavoring No    • Other No    • Unknown No      Social History     Tobacco Use   • Smoking status: Never   • Smokeless tobacco: Never   Vaping Use   • Vaping Use: Never used   Substance Use Topics   • Alcohol use: Not Currently • Drug use: Never       Review of Systems   Constitutional: Negative for chills, fatigue and fever  HENT: Negative for congestion, ear pain, rhinorrhea and sore throat  Eyes: Negative for redness  Respiratory: Negative for chest tightness and shortness of breath  Cardiovascular: Negative for chest pain and palpitations  Gastrointestinal: Negative for abdominal pain, nausea and vomiting  Genitourinary: Negative for dysuria and hematuria  Musculoskeletal: Positive for arthralgias  Skin: Negative for rash  Neurological: Negative for dizziness, syncope, light-headedness and numbness  Physical Exam  Physical Exam  Vitals and nursing note reviewed  Constitutional:       Appearance: She is well-developed  HENT:      Head: Normocephalic  Eyes:      General: No scleral icterus  Cardiovascular:      Rate and Rhythm: Normal rate and regular rhythm  Pulmonary:      Effort: Pulmonary effort is normal       Breath sounds: Normal breath sounds  No stridor  Abdominal:      General: There is no distension  Palpations: Abdomen is soft  Tenderness: There is no abdominal tenderness  Musculoskeletal:         General: Normal range of motion  Feet:    Skin:     General: Skin is warm and dry  Capillary Refill: Capillary refill takes less than 2 seconds  Neurological:      Mental Status: She is alert and oriented to person, place, and time           Vital Signs  ED Triage Vitals [12/13/22 1225]   Temperature Pulse Respirations Blood Pressure SpO2   (!) 97 2 °F (36 2 °C) 65 20 (!) 131/46 97 %      Temp Source Heart Rate Source Patient Position - Orthostatic VS BP Location FiO2 (%)   Tympanic Monitor Sitting Left arm --      Pain Score       --           Vitals:    12/13/22 1225   BP: (!) 131/46   Pulse: 65   Patient Position - Orthostatic VS: Sitting         Visual Acuity      ED Medications  Medications - No data to display    Diagnostic Studies  Results Reviewed     None XR foot 3+ views RIGHT   ED Interpretation by Dayna Carrizales PA-C (12/13 1329)   No acute fractures or dislocations visualized                 Procedures  Procedures         ED Course  ED Course as of 12/13/22 1333   Tue Dec 13, 2022   1329 Imaging review done by myself and preliminary results were discussed with the patient and family members  Discussion was had with patient and family members that this read is preliminary and therefore discrepancies between this read and the final read by the radiologist are possible  Patient and family members were informed that any discrepancies found by would be relayed by phone call  Patient was reexamined at this time and informed of laboratory and/or imaging results and was found to be stable for discharge  Return to emergency department criteria was reviewed with the patient who verbalized understanding and was agreeable to discharge and the treatment plan at this time  MDM  Number of Diagnoses or Management Options  Diagnosis management comments: All imaging and/or lab testing discussed with patient, strict return to ED precautions discussed  Patient recommended to follow up promptly with appropriate outpatient provider  Patient and/or family members verbalizes understanding and agrees with plan  Patient is stable for discharge      Portions of the record may have been created with voice recognition software  Occasional wrong word or "sound a like" substitutions may have occurred due to the inherent limitations of voice recognition software  Read the chart carefully and recognize, using context, where substitutions have occurred          Disposition  Final diagnoses:   Chronic pain in right foot     Time reflects when diagnosis was documented in both MDM as applicable and the Disposition within this note     Time User Action Codes Description Comment    12/13/2022  1:24 PM Harlene Rubinstein [V61 [id number], G89 29] Chronic pain in right foot       ED Disposition     ED Disposition   Discharge    Condition   Good    Date/Time   Tue Dec 13, 2022  1:24 PM    Comment   Sophie Hansen discharge to home/self care  Follow-up Information     Follow up With Specialties Details Why Contact Info Additional Information    [address] [facility] Schedule an appointment as soon as possible for a visit in 2 days for follow up regarding orthopedics [address]  [id number]  [address] [place] [address], [id number] [phone]          Patient's Medications   Discharge Prescriptions    ACETAMINOPHEN (TYLENOL) 500 MG TABLET    Take 1 tablet (500 mg total) by mouth every 6 (six) hours as needed for moderate pain       Start Date: 12/13/2022End Date: --       Order Dose: 500 mg       Quantity: 30 tablet    Refills: 0       No discharge procedures on file      PDMP Review     None          ED Provider  Electronically Signed by           Shana Riggs PA-C  12/13/22 8815

## 2023-02-20 ENCOUNTER — OFFICE VISIT (OUTPATIENT)
Dept: FAMILY MEDICINE CLINIC | Facility: CLINIC | Age: 58
End: 2023-02-20

## 2023-02-20 VITALS
RESPIRATION RATE: 18 BRPM | HEIGHT: 61 IN | SYSTOLIC BLOOD PRESSURE: 118 MMHG | BODY MASS INDEX: 30.17 KG/M2 | HEART RATE: 80 BPM | DIASTOLIC BLOOD PRESSURE: 80 MMHG | TEMPERATURE: 98 F | OXYGEN SATURATION: 98 % | WEIGHT: 159.8 LBS

## 2023-02-20 DIAGNOSIS — F51.04 PSYCHOPHYSIOLOGICAL INSOMNIA: ICD-10-CM

## 2023-02-20 DIAGNOSIS — F41.9 ANXIETY: ICD-10-CM

## 2023-02-20 DIAGNOSIS — R73.03 PRE-DIABETES: Primary | ICD-10-CM

## 2023-02-20 DIAGNOSIS — R35.0 URINARY FREQUENCY: ICD-10-CM

## 2023-02-20 LAB
SL AMB  POCT GLUCOSE, UA: NEGATIVE
SL AMB LEUKOCYTE ESTERASE,UA: NEGATIVE
SL AMB POCT BILIRUBIN,UA: NEGATIVE
SL AMB POCT BLOOD,UA: ABNORMAL
SL AMB POCT CLARITY,UA: CLEAR
SL AMB POCT COLOR,UA: YELLOW
SL AMB POCT HEMOGLOBIN AIC: 5.7 (ref ?–6.5)
SL AMB POCT KETONES,UA: NEGATIVE
SL AMB POCT NITRITE,UA: NEGATIVE
SL AMB POCT PH,UA: 5
SL AMB POCT SPECIFIC GRAVITY,UA: 1.02
SL AMB POCT URINE PROTEIN: NEGATIVE
SL AMB POCT UROBILINOGEN: NEGATIVE

## 2023-02-20 RX ORDER — HYDROXYZINE HYDROCHLORIDE 10 MG/1
10 TABLET, FILM COATED ORAL EVERY 6 HOURS PRN
Qty: 90 TABLET | Refills: 1 | Status: SHIPPED | OUTPATIENT
Start: 2023-02-20

## 2023-02-20 RX ORDER — SERTRALINE HYDROCHLORIDE 25 MG/1
25 TABLET, FILM COATED ORAL DAILY
Qty: 30 TABLET | Refills: 5 | Status: SHIPPED | OUTPATIENT
Start: 2023-02-20

## 2023-02-20 NOTE — PROGRESS NOTES
Name: Eli Mace      : 1965      MRN: [id number]  Encounter Provider: Precious Meigs, CRNP  Encounter Date: 2023   Encounter department: [address] [place] [address]  Pre-diabetes  Assessment & Plan:  Patient reports compliance with treatment, has been experiencing polydipsia and polyuria   POCT A1C-  Lab Results   Component Value Date    HGBA1C 5 7 2023         A1c improved, continue metformin 500 mg dailly   -Encouraged diet and lifestyle changes: decrease processed foods (cakes, cookies, chips, soda), decrease total carbohydrate intake, decrease fried/fatty foods, increase fruits and vegetables, increase lean proteins (chicken, turkey), increase healthy fats (avocado, fish, nuts), drink plenty of water (at least four 16 oz bottles per day)  Nutrition consult to help patient identify areas patient can make adjustments    Orders:  -     POCT hemoglobin A1c  -     Ambulatory Referral to Nutrition Services; Future  -     metFORMIN (GLUCOPHAGE) 500 mg tablet; Take 1 tablet (500 mg total) by mouth daily with breakfast    2  Psychophysiological insomnia  Assessment & Plan: Will start Zoloft   Continue with melatonin nightly    Advised patient on behavioral therapy: sleep hygiene, stimulus control therapy, relaxation,  sleep restriction therapy  Follow up in 4 weeks for reassessment     get up go to bed at the same time every day, including weekends  No alcohol in the evening  No smoking, especially in the evening  Exercise regularly, at least several hours before bed  Do not drink caffeine-containing beverages after noon  Do not go bed hungry  Keep your bedroom quiet and dark  Do not force sleep  Only quiet activities in the evening  Limit screen-time in the evenings  Go to bed only when you want to sleep, do not stay in bed for more then 20 min if can not  fall asleep, go back to bed only when sleepy  Do not nap during the day  Use bed only for sleep, do not eat, drink, watch TV or read in bed  Try relaxation therapy      Orders:  -     Melatonin 500 MCG TBDP; Take 1 tablet (500 mcg total) by mouth daily at bedtime as needed (insomnia)    3  Anxiety  Assessment & Plan:  Patient feeling stressed during periods of having to travel to [place] for work  Discussed starting a SSRI for management and hydroxyzine as needed  Orders:  -     sertraline (Zoloft) 25 mg tablet; Take 1 tablet (25 mg total) by mouth daily  -     hydrOXYzine HCL (ATARAX) 10 mg tablet; Take 1 tablet (10 mg total) by mouth every 6 (six) hours as needed for itching    4  Urinary frequency  Comments:  POCT neg for infection  Orders:  -     POCT urine dip    BMI Counseling: Body mass index is 30 19 kg/m²  The BMI is above normal  Nutrition recommendations include decreasing portion sizes, encouraging healthy choices of fruits and vegetables, decreasing fast food intake, consuming healthier snacks, limiting drinks that contain sugar, moderation in carbohydrate intake, increasing intake of lean protein, reducing intake of saturated and trans fat and reducing intake of cholesterol  Exercise recommendations include exercising 3-5 times per week  No pharmacotherapy was ordered  Rationale for BMI follow-up plan is due to patient being overweight or obese  Lasha      Charlette Dillon 65 y o  female  has no past medical history on file  Patient presenting today to be evaluated for insomnia  On Thursday se  went to work and was not able to sleep that night, 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, Friday slept 4 hr, Saturday again did not sleep at all and Sunday sleep took melatonin and slept 6 hrs  Patient also experiencing urinary frequency, polydipsia, denies polyphagia  Patient reports staring this job four months ago and due to the stress of traveling to [place] since starting this job she has increased anxiety  Review of Systems   Constitutional: Negative for chills and fever  HENT: Negative for ear pain and sore throat  Eyes: Negative for pain and visual disturbance  Respiratory: Negative for cough and shortness of breath  Cardiovascular: Negative for chest pain and palpitations  Gastrointestinal: Negative for abdominal pain and vomiting  Endocrine: Positive for polydipsia and polyuria  Genitourinary: Negative for dysuria and hematuria  Musculoskeletal: Negative for arthralgias and back pain  Skin: Negative for color change and rash  Neurological: Negative for seizures and syncope  All other systems reviewed and are negative        Current Outpatient Medications on File Prior to Visit   Medication Sig   • acetaminophen (TYLENOL) 500 mg tablet Take 1 tablet (500 mg total) by mouth every 6 (six) hours as needed for moderate pain   • al mag oxide-diphenhydramine-lidocaine viscous (MAGIC MOUTHWASH) 1:1:1 suspension Swish and spit 10 mL every 4 (four) hours as needed for mouth pain or discomfort   • Ascorbic Acid (vitamin C) 1000 MG tablet Take 1 tablet (1,000 mg total) by mouth every 12 (twelve) hours   • Calcium Carb-Cholecalciferol (Oyster Shell Calcium w/D) 500-200 MG-UNIT TABS No    • calcium carbonate-vitamin D (OSCAL-D) 500 mg-200 units per tablet Take 1 tablet by mouth daily with breakfast   • cetirizine (ZyrTEC) 10 mg tablet Take 1 tablet (10 mg total) by mouth daily   • cholecalciferol (VITAMIN D3) 1,000 units tablet Take 2 tablets (2,000 Units total) by mouth daily   • fluticasone (FLONASE) 50 mcg/act nasal spray 1 spray into each nostril daily   • lidocaine (LIDODERM) 5 % Apply 1 patch topically daily Remove & Discard patch within 12 hours or as directed by MD   • menthol-cetylpyridinium (CEPACOL) 3 MG lozenge Take 1 lozenge (3 mg total) by mouth as needed for sore throat   • methocarbamol (ROBAXIN) 500 mg tablet Take 1 tablet (500 mg total) by mouth daily at bedtime as needed for muscle spasms   • Multiple Vitamin (multivitamin) tablet Take 1 tablet by mouth daily   • naproxen (NAPROSYN) 500 mg tablet Take 1 tablet (500 mg total) by mouth 2 (two) times a day with meals   • naproxen (Naprosyn) 500 mg tablet Take 1 tablet (500 mg total) by mouth 2 (two) times a day with meals   • omeprazole (PriLOSEC) 20 mg delayed release capsule Take 1 capsule (20 mg total) by mouth daily   • [DISCONTINUED] metFORMIN (GLUCOPHAGE) 500 mg tablet Take 1 tablet (500 mg total) by mouth daily with breakfast       Objective     /80 (BP Location: Left arm, Patient Position: Sitting, Cuff Size: Adult)   Pulse 80   Temp 98 °F (36 7 °C) (Temporal)   Resp 18   Ht 5' 1" (1 549 m)   Wt 72 5 kg (159 lb 12 8 oz)   SpO2 98%   BMI 30 19 kg/m²     Physical Exam  Vitals and nursing note reviewed  Constitutional:       General: She is not in acute distress  Appearance: Normal appearance  She is not ill-appearing  HENT:      Head: Normocephalic and atraumatic  Right Ear: Tympanic membrane, ear canal and external ear normal       Left Ear: Tympanic membrane, ear canal and external ear normal       Nose: Nose normal       Mouth/Throat:      Mouth: Mucous membranes are moist    Eyes:      General:         Right eye: No discharge  Left eye: No discharge  Pupils: Pupils are equal, round, and reactive to light  Cardiovascular:      Rate and Rhythm: Normal rate and regular rhythm  Pulses: Normal pulses  Heart sounds: Normal heart sounds  Pulmonary:      Effort: Pulmonary effort is normal  No respiratory distress  Breath sounds: Normal breath sounds  No wheezing  Abdominal:      General: Bowel sounds are normal       Palpations: Abdomen is soft  Tenderness: There is no abdominal tenderness  There is no right CVA tenderness or left CVA tenderness  Musculoskeletal:         General: Normal range of motion  Cervical back: Normal range of motion  Skin:     General: Skin is warm and dry  Neurological:      General: No focal deficit present  Mental Status: She is alert and oriented to person, place, and time     Psychiatric:         Mood and Affect: Mood normal          Behavior: Behavior normal        DIMITRI Morse

## 2023-02-20 NOTE — LETTER
February 20, 2023     Patient: Mike Zamora  YOB: 1965  Date of Visit: 2/20/2023      To Whom it May Concern:    Mike Zamora is under my professional care  Pj Vaughn was seen in my office on 2/20/2023  Please excused Pj Vaughn on 2/23/2023, 2/24/2023, 2/25/2023 and 2/26/2023  Pj Vaughn may return to school on 3/2/2023  If you have any questions or concerns, please don't hesitate to call           Sincerely,          DIMITRI Luther        CC: No Recipients

## 2023-02-20 NOTE — ASSESSMENT & PLAN NOTE
Patient feeling stressed during periods of having to travel to [place] for work  Discussed starting a SSRI for management and hydroxyzine as needed

## 2023-02-20 NOTE — ASSESSMENT & PLAN NOTE
Patient reports compliance with treatment, has been experiencing polydipsia and polyuria   POCT A1C-  Lab Results   Component Value Date    HGBA1C 5 7 02/20/2023         A1c improved, continue metformin 500 mg dailly   -Encouraged diet and lifestyle changes: decrease processed foods (cakes, cookies, chips, soda), decrease total carbohydrate intake, decrease fried/fatty foods, increase fruits and vegetables, increase lean proteins (chicken, turkey), increase healthy fats (avocado, fish, nuts), drink plenty of water (at least four 16 oz bottles per day)  Nutrition consult to help patient identify areas patient can make adjustments

## 2023-02-20 NOTE — ASSESSMENT & PLAN NOTE
Will start Zoloft   Continue with melatonin nightly    Advised patient on behavioral therapy: sleep hygiene, stimulus control therapy, relaxation,  sleep restriction therapy  Follow up in 4 weeks for reassessment     get up go to bed at the same time every day, including weekends  No alcohol in the evening  No smoking, especially in the evening  Exercise regularly, at least several hours before bed  Do not drink caffeine-containing beverages after noon  Do not go bed hungry  Keep your bedroom quiet and dark  Do not force sleep  Only quiet activities in the evening  Limit screen-time in the evenings  Go to bed only when you want to sleep, do not stay in bed for more then 20 min if can not  fall asleep, go back to bed only when sleepy  Do not nap during the day  Use bed only for sleep, do not eat, drink, watch TV or read in bed  Try relaxation therapy

## 2023-05-14 ENCOUNTER — HOSPITAL ENCOUNTER (EMERGENCY)
Facility: HOSPITAL | Age: 58
Discharge: HOME | End: 2023-05-14
Attending: EMERGENCY MEDICINE

## 2023-05-14 VITALS
RESPIRATION RATE: 16 BRPM | WEIGHT: 162.5 LBS | BODY MASS INDEX: 30.7 KG/M2 | TEMPERATURE: 98.5 F | HEART RATE: 75 BPM | DIASTOLIC BLOOD PRESSURE: 62 MMHG | OXYGEN SATURATION: 98 % | SYSTOLIC BLOOD PRESSURE: 123 MMHG

## 2023-05-14 DIAGNOSIS — R19.7 DIARRHEA: ICD-10-CM

## 2023-05-14 DIAGNOSIS — R53.83 FATIGUE: Primary | ICD-10-CM

## 2023-05-14 LAB
ALBUMIN SERPL BCP-MCNC: 3.6 G/DL (ref 3.5–5)
ALP SERPL-CCNC: 71 U/L (ref 34–104)
ALT SERPL W P-5'-P-CCNC: 45 U/L (ref 7–52)
ANION GAP SERPL CALCULATED.3IONS-SCNC: 6 MMOL/L (ref 4–13)
AST SERPL W P-5'-P-CCNC: 30 U/L (ref 13–39)
ATRIAL RATE: 78 BPM
BASOPHILS # BLD AUTO: 0.01 THOUSANDS/ÂΜL (ref 0–0.1)
BASOPHILS NFR BLD AUTO: 0 % (ref 0–1)
BILIRUB SERPL-MCNC: 0.45 MG/DL (ref 0.2–1)
BILIRUB UR QL STRIP: NEGATIVE
BNP SERPL-MCNC: 41 PG/ML (ref 0–100)
BUN SERPL-MCNC: 26 MG/DL (ref 5–25)
CALCIUM SERPL-MCNC: 8.9 MG/DL (ref 8.4–10.2)
CARDIAC TROPONIN I PNL SERPL HS: 3 NG/L (ref 8–18)
CHLORIDE SERPL-SCNC: 103 MMOL/L (ref 96–108)
CLARITY UR: ABNORMAL
CO2 SERPL-SCNC: 29 MMOL/L (ref 21–32)
COLOR UR: ABNORMAL
CREAT SERPL-MCNC: 0.71 MG/DL (ref 0.6–1.3)
EOSINOPHIL # BLD AUTO: 0.02 THOUSAND/ÂΜL (ref 0–0.61)
EOSINOPHIL NFR BLD AUTO: 0 % (ref 0–6)
ERYTHROCYTE [DISTWIDTH] IN BLOOD BY AUTOMATED COUNT: 15.9 % (ref 11.6–15.1)
GFR SERPL CREATININE-BSD FRML MDRD: 95 ML/MIN/1.73SQ M
GLUCOSE SERPL-MCNC: 101 MG/DL (ref 65–140)
GLUCOSE SERPL-MCNC: 115 MG/DL (ref 65–140)
GLUCOSE UR STRIP-MCNC: NEGATIVE MG/DL
HCT VFR BLD AUTO: 38.1 % (ref 34.8–46.1)
HGB BLD-MCNC: 12.3 G/DL (ref 11.5–15.4)
HGB UR QL STRIP.AUTO: NEGATIVE
IMM GRANULOCYTES # BLD AUTO: 0.08 THOUSAND/UL (ref 0–0.2)
IMM GRANULOCYTES NFR BLD AUTO: 1 % (ref 0–2)
KETONES UR STRIP-MCNC: NEGATIVE MG/DL
LEUKOCYTE ESTERASE UR QL STRIP: NEGATIVE
LIPASE SERPL-CCNC: 32 U/L (ref 11–82)
LYMPHOCYTES # BLD AUTO: 1.65 THOUSANDS/ÂΜL (ref 0.6–4.47)
LYMPHOCYTES NFR BLD AUTO: 14 % (ref 14–44)
MAGNESIUM SERPL-MCNC: 2.3 MG/DL (ref 1.9–2.7)
MCH RBC QN AUTO: 26.9 PG (ref 26.8–34.3)
MCHC RBC AUTO-ENTMCNC: 32.3 G/DL (ref 31.4–37.4)
MCV RBC AUTO: 83 FL (ref 82–98)
MONOCYTES # BLD AUTO: 1.49 THOUSAND/ÂΜL (ref 0.17–1.22)
MONOCYTES NFR BLD AUTO: 13 % (ref 4–12)
NEUTROPHILS # BLD AUTO: 8.23 THOUSANDS/ÂΜL (ref 1.85–7.62)
NEUTS SEG NFR BLD AUTO: 72 % (ref 43–75)
NITRITE UR QL STRIP: NEGATIVE
NRBC BLD AUTO-RTO: 0 /100 WBCS
P AXIS: 42 DEGREES
PH UR STRIP.AUTO: 7 [PH]
PLATELET # BLD AUTO: 318 THOUSANDS/UL (ref 149–390)
PMV BLD AUTO: 11.4 FL (ref 8.9–12.7)
POTASSIUM SERPL-SCNC: 5.1 MMOL/L (ref 3.5–5.3)
PR INTERVAL: 116 MS
PROT SERPL-MCNC: 6.6 G/DL (ref 6.4–8.4)
PROT UR STRIP-MCNC: NEGATIVE MG/DL
QRS AXIS: 32 DEGREES
QRSD INTERVAL: 74 MS
QT INTERVAL: 410 MS
QTC INTERVAL: 467 MS
RBC # BLD AUTO: 4.57 MILLION/UL (ref 3.81–5.12)
SODIUM SERPL-SCNC: 138 MMOL/L (ref 135–147)
SP GR UR STRIP.AUTO: 1.01 (ref 1–1.04)
T WAVE AXIS: 237 DEGREES
UROBILINOGEN UA: NEGATIVE MG/DL
VENTRICULAR RATE: 78 BPM
WBC # BLD AUTO: 11.48 THOUSAND/UL (ref 4.31–10.16)

## 2023-05-14 RX ADMIN — SODIUM CHLORIDE 1000 ML: 0.9 INJECTION, SOLUTION INTRAVENOUS at 17:11

## 2023-05-14 NOTE — Clinical Note
Joyce Velez was seen and treated in our emergency department on 5/14/2023  Diagnosis:     Helga    She may return on this date: 05/17/2023         If you have any questions or concerns, please don't hesitate to call        Soren Tatum DO    ______________________________           _______________          _______________  Hospital Representative                              Date                                Time

## 2023-05-14 NOTE — Clinical Note
accompanied Carola John to the emergency department on 5/14/2023  Return date if applicable: 98/83/8793        If you have any questions or concerns, please don't hesitate to call        Nano Wang, DO

## 2023-05-15 NOTE — ED PROVIDER NOTES
History  Chief Complaint   Patient presents with   • Weakness - Generalized     Generalized weakness and mild body aches x4 days  No fevers  No cp/sob, no abd pain or n/v/d     Patient is a 43-year-old female who presents for concerns of 4 days of fatigue and having nonbloody diarrhea 2 days ago which has resolved  Patient Brazilian-speaking, she was offered  service and declined  She would like to use her son who is at the bedside for translation  She has no other sick contacts no previous abdominal surgeries, no dysuria or frequency  She just feels unwell  No chest pain shortness of breath no fevers, no neck pain  Prior to Admission Medications   Prescriptions Last Dose Informant Patient Reported? Taking?    Ascorbic Acid (vitamin C) 1000 MG tablet   No No   Sig: Take 1 tablet (1,000 mg total) by mouth every 12 (twelve) hours   Calcium Carb-Cholecalciferol (Oyster Shell Calcium w/D) 500-200 MG-UNIT TABS No   Yes No   Melatonin 500 MCG TBDP   No No   Sig: Take 1 tablet (500 mcg total) by mouth daily at bedtime as needed (insomnia)   Multiple Vitamin (multivitamin) tablet   No No   Sig: Take 1 tablet by mouth daily   acetaminophen (TYLENOL) 500 mg tablet   No No   Sig: Take 1 tablet (500 mg total) by mouth every 6 (six) hours as needed for moderate pain   al mag oxide-diphenhydramine-lidocaine viscous (MAGIC MOUTHWASH) 1:1:1 suspension   No No   Sig: Swish and spit 10 mL every 4 (four) hours as needed for mouth pain or discomfort   calcium carbonate-vitamin D (OSCAL-D) 500 mg-200 units per tablet   No No   Sig: Take 1 tablet by mouth daily with breakfast   cetirizine (ZyrTEC) 10 mg tablet   No No   Sig: Take 1 tablet (10 mg total) by mouth daily   cholecalciferol (VITAMIN D3) 1,000 units tablet   No No   Sig: Take 2 tablets (2,000 Units total) by mouth daily   fluticasone (FLONASE) 50 mcg/act nasal spray   No No   Si spray into each nostril daily   hydrOXYzine HCL (ATARAX) 10 mg tablet No No   Sig: Take 1 tablet (10 mg total) by mouth every 6 (six) hours as needed for itching   lidocaine (LIDODERM) 5 %   No No   Sig: Apply 1 patch topically daily Remove & Discard patch within 12 hours or as directed by MD   menthol-cetylpyridinium (CEPACOL) 3 MG lozenge   No No   Sig: Take 1 lozenge (3 mg total) by mouth as needed for sore throat   metFORMIN (GLUCOPHAGE) 500 mg tablet   No No   Sig: Take 1 tablet (500 mg total) by mouth daily with breakfast   methocarbamol (ROBAXIN) 500 mg tablet   No No   Sig: Take 1 tablet (500 mg total) by mouth daily at bedtime as needed for muscle spasms   naproxen (NAPROSYN) 500 mg tablet   No No   Sig: Take 1 tablet (500 mg total) by mouth 2 (two) times a day with meals   naproxen (Naprosyn) 500 mg tablet   No No   Sig: Take 1 tablet (500 mg total) by mouth 2 (two) times a day with meals   omeprazole (PriLOSEC) 20 mg delayed release capsule   No No   Sig: Take 1 capsule (20 mg total) by mouth daily   sertraline (Zoloft) 25 mg tablet   No No   Sig: Take 1 tablet (25 mg total) by mouth daily      Facility-Administered Medications: None       History reviewed  No pertinent past medical history  Past Surgical History:   Procedure Laterality Date   • BILATERAL OOPHORECTOMY Bilateral    • BREAST BIOPSY Left    •  SECTION      x2       Family History   Problem Relation Age of Onset   • No Known Problems Mother    • Liver cancer Father    • Pancreatic cancer Father    • No Known Problems Sister    • No Known Problems Sister    • No Known Problems Sister    • No Known Problems Sister    • No Known Problems Daughter    • No Known Problems Daughter    • Vaginal cancer Maternal Grandmother    • Uterine cancer Maternal Grandmother    • Breast cancer Paternal Aunt      I have reviewed and agree with the history as documented      E-Cigarette/Vaping   • E-Cigarette Use Never User      E-Cigarette/Vaping Substances   • Nicotine No    • THC No    • CBD No    • Flavoring No • Other No    • Unknown No      Social History     Tobacco Use   • Smoking status: Never   • Smokeless tobacco: Never   Vaping Use   • Vaping Use: Never used   Substance Use Topics   • Alcohol use: Not Currently   • Drug use: Never       Review of Systems   Constitutional: Positive for fatigue  Negative for chills and fever  HENT: Negative  Negative for rhinorrhea, sore throat, trouble swallowing and voice change  Eyes: Negative  Negative for pain and visual disturbance  Respiratory: Negative  Negative for cough, shortness of breath and wheezing  Cardiovascular: Negative  Negative for chest pain and palpitations  Gastrointestinal: Positive for diarrhea  Negative for abdominal pain, nausea and vomiting  Genitourinary: Negative  Negative for dysuria and frequency  Musculoskeletal: Negative  Negative for neck pain and neck stiffness  Skin: Negative  Negative for rash  Neurological: Negative  Negative for dizziness, speech difficulty, weakness, light-headedness and numbness  Physical Exam  Physical Exam  Vitals and nursing note reviewed  Constitutional:       General: She is not in acute distress  Appearance: She is well-developed  HENT:      Head: Normocephalic and atraumatic  Eyes:      Conjunctiva/sclera: Conjunctivae normal       Pupils: Pupils are equal, round, and reactive to light  Neck:      Trachea: No tracheal deviation  Cardiovascular:      Rate and Rhythm: Normal rate and regular rhythm  Pulmonary:      Effort: Pulmonary effort is normal  No respiratory distress  Breath sounds: Normal breath sounds  No wheezing or rales  Abdominal:      General: Bowel sounds are normal  There is no distension  Palpations: Abdomen is soft  Tenderness: There is no abdominal tenderness  There is no guarding or rebound  Musculoskeletal:         General: No tenderness or deformity  Normal range of motion        Cervical back: Normal range of motion and neck supple  Skin:     General: Skin is warm and dry  Capillary Refill: Capillary refill takes less than 2 seconds  Findings: No rash  Neurological:      Mental Status: She is alert and oriented to person, place, and time     Psychiatric:         Behavior: Behavior normal          Vital Signs  ED Triage Vitals [05/14/23 1631]   Temperature Pulse Respirations Blood Pressure SpO2   98 5 °F (36 9 °C) 83 14 142/78 97 %      Temp Source Heart Rate Source Patient Position - Orthostatic VS BP Location FiO2 (%)   Oral Monitor Sitting Left arm --      Pain Score       --           Vitals:    05/14/23 1631 05/14/23 1846   BP: 142/78 123/62   Pulse: 83 75   Patient Position - Orthostatic VS: Sitting Lying         Visual Acuity      ED Medications  Medications   sodium chloride 0 9 % bolus 1,000 mL (0 mL Intravenous Stopped 5/14/23 1842)       Diagnostic Studies  Results Reviewed     Procedure Component Value Units Date/Time    UA (URINE) with reflex to Scope [[id number]]  (Abnormal) Collected: 05/14/23 1846    Lab Status: Final result Specimen: Urine, Other Updated: 05/14/23 1853     Color, UA Straw     Clarity, UA Slightly Cloudy     Specific [place], UA 1 010     pH, UA 7 0     Leukocytes, UA Negative     Nitrite, UA Negative     Protein, UA Negative mg/dl      Glucose, UA Negative mg/dl      Ketones, UA Negative mg/dl      Bilirubin, UA Negative     Occult Blood, UA Negative     UROBILINOGEN UA Negative mg/dL     High Sensitivity Troponin I Random [[id number]]  (Abnormal) Collected: 05/14/23 1710    Lab Status: Final result Specimen: Blood from Arm, Left Updated: 05/14/23 1752     HS TnI random 3 ng/L     Comprehensive metabolic panel [[id number]]  (Abnormal) Collected: 05/14/23 1710    Lab Status: Final result Specimen: Blood from Arm, Left Updated: 05/14/23 1751     Sodium 138 mmol/L      Potassium 5 1 mmol/L      Chloride 103 mmol/L      CO2 29 mmol/L      ANION GAP 6 mmol/L      BUN 26 mg/dL      Creatinine 0 71 mg/dL      Glucose 101 mg/dL      Calcium 8 9 mg/dL      AST 30 U/L      ALT 45 U/L      Alkaline Phosphatase 71 U/L      Total Protein 6 6 g/dL      Albumin 3 6 g/dL      Total Bilirubin 0 45 mg/dL      eGFR 95 ml/min/1 73sq m     Narrative:      National Kidney Disease Foundation guidelines for Chronic Kidney Disease (CKD):   •  Stage 1 with normal or high GFR (GFR > 90 mL/min/1 73 square meters)  •  Stage 2 Mild CKD (GFR = 60-89 mL/min/1 73 square meters)  •  Stage 3A Moderate CKD (GFR = 45-59 mL/min/1 73 square meters)  •  Stage 3B Moderate CKD (GFR = 30-44 mL/min/1 73 square meters)  •  Stage 4 Severe CKD (GFR = 15-29 mL/min/1 73 square meters)  •  Stage 5 End Stage CKD (GFR <15 mL/min/1 73 square meters)  Note: GFR calculation is accurate only with a steady state creatinine    Lipase [[id number]]  (Normal) Collected: 05/14/23 1710    Lab Status: Final result Specimen: Blood from Arm, Left Updated: 05/14/23 1751     Lipase 32 u/L     Magnesium [[id number]]  (Normal) Collected: 05/14/23 1710    Lab Status: Final result Specimen: Blood from Arm, Left Updated: 05/14/23 1751     Magnesium 2 3 mg/dL     B-Type Natriuretic Peptide(BNP) [[id number]]  (Normal) Collected: 05/14/23 1710    Lab Status: Final result Specimen: Blood from Arm, Left Updated: 05/14/23 1750     BNP 41 pg/mL     CBC and differential [[id number]]  (Abnormal) Collected: 05/14/23 1710    Lab Status: Final result Specimen: Blood from Arm, Left Updated: 05/14/23 1722     WBC 11 48 Thousand/uL      RBC 4 57 Million/uL      Hemoglobin 12 3 g/dL      Hematocrit 38 1 %      MCV 83 fL      MCH 26 9 pg      MCHC 32 3 g/dL      RDW 15 9 %      MPV 11 4 fL      Platelets 307 Thousands/uL      nRBC 0 /100 WBCs      Neutrophils Relative 72 %      Immat GRANS % 1 %      Lymphocytes Relative 14 %      Monocytes Relative 13 %      Eosinophils Relative 0 %      Basophils Relative 0 %      Neutrophils Absolute 8 23 Thousands/µL      Immature Grans Absolute 0 08 Thousand/uL      Lymphocytes Absolute 1 65 Thousands/µL      Monocytes Absolute 1 49 Thousand/µL      Eosinophils Absolute 0 02 Thousand/µL      Basophils Absolute 0 01 Thousands/µL     Fingerstick Glucose (POCT) [[id number]]  (Normal) Collected: 05/14/23 1654    Lab Status: Final result Updated: 05/14/23 1655     POC Glucose 115 mg/dl                  No orders to display              Procedures  Procedures         ED Course  ED Course as of 05/15/23 1019   Sun May 14, 2023   1706 Procedure Note: EKG  Date/Time: 05/14/23 5:06 PM   Performed by: Taryn Goldman  Authorized by: Taryn Goldman  ECG interpreted by me, the ED Provider: yes   The EKG demonstrates:  Rate 78  Rhythm sinus  QTc 467  No ST elevations/depressions                                                 Medical Decision Making    Reviewed past medical records: Yes, Seen in this ER in December 2022 for foot pain  Was able to be discharged     History Provided by: Patient and son     Differential considered: Viral illness, appendicitis, dehydration     Consideration of tests: Patient required lab work to assess for Her liver and kidney function, declined viral testing in the ED  UA without evidence of dehydration or infection  Neurologic lab work is grossly normal   Patient feeling better after symptomatic management in the ED  She has a benign abdominal exam at this point do not feel that imaging is to rule out surgical abdomen  Patient has a need for follow-up  I have reviewed the patient's visit and any testing done in the emergency department  They have verbalized their understanding of any testing done today and have no further questions or concerns regarding their care in the emergency room  They will follow up with their primary care physician as well as with any specialist in their discharge instructions  Strict return precautions were discussed  Amount and/or Complexity of Data Reviewed  Labs: ordered            Disposition  Final diagnoses:   Fatigue   Diarrhea     Time reflects when diagnosis was documented in both MDM as applicable and the Disposition within this note     Time User Action Codes Description Comment    5/14/2023  6:54 PM Veejuan Eaton Add [R53 83] Fatigue     5/14/2023  6:54 PM Veejuan Eaton Add [R19 7] Diarrhea       ED Disposition     ED Disposition   Discharge    Condition   Stable    Date/Time   Sun May 14, 2023  6:54 PM    Comment   Marvin Christensen discharge to home/self care                 Follow-up Information     Follow up With Specialties Details Why DIMITRI Simmons Nurse Practitioner, Family Medicine   [address] [place] [address]  [phone]            Discharge Medication List as of 5/14/2023  6:55 PM      CONTINUE these medications which have NOT CHANGED    Details   acetaminophen (TYLENOL) 500 mg tablet Take 1 tablet (500 mg total) by mouth every 6 (six) hours as needed for moderate pain, Starting Tue 12/13/2022, Normal      al mag oxide-diphenhydramine-lidocaine viscous (MAGIC MOUTHWASH) 1:1:1 suspension Swish and spit 10 mL every 4 (four) hours as needed for mouth pain or discomfort, Starting Tue 10/25/2022, Normal      Ascorbic Acid (vitamin C) 1000 MG tablet Take 1 tablet (1,000 mg total) by mouth every 12 (twelve) hours, Starting Sun 11/29/2020, Normal      Calcium Carb-Cholecalciferol (Oyster Shell Calcium w/D) 500-200 MG-UNIT TABS No Starting Mon 1/31/2022, Historical Med      calcium carbonate-vitamin D (OSCAL-D) 500 mg-200 units per tablet Take 1 tablet by mouth daily with breakfast, Starting Thu 12/16/2021, Normal      cetirizine (ZyrTEC) 10 mg tablet Take 1 tablet (10 mg total) by mouth daily, Starting Fri 10/1/2021, Normal      cholecalciferol (VITAMIN D3) 1,000 units tablet Take 2 tablets (2,000 Units total) by mouth daily, Starting Sun 11/29/2020, Normal      fluticasone (FLONASE) 50 mcg/act nasal spray 1 spray into each nostril daily, Starting Fri 10/1/2021, Normal      hydrOXYzine HCL (ATARAX) 10 mg tablet Take 1 tablet (10 mg total) by mouth every 6 (six) hours as needed for itching, Starting Mon 2/20/2023, Normal      lidocaine (LIDODERM) 5 % Apply 1 patch topically daily Remove & Discard patch within 12 hours or as directed by MD, Starting Mon 10/24/2022, Normal      Melatonin 500 MCG TBDP Take 1 tablet (500 mcg total) by mouth daily at bedtime as needed (insomnia), Starting Mon 2/20/2023, OTC      menthol-cetylpyridinium (CEPACOL) 3 MG lozenge Take 1 lozenge (3 mg total) by mouth as needed for sore throat, Starting Tue 11/1/2022, Normal      metFORMIN (GLUCOPHAGE) 500 mg tablet Take 1 tablet (500 mg total) by mouth daily with breakfast, Starting Mon 2/20/2023, Until Sun 5/21/2023, Normal      methocarbamol (ROBAXIN) 500 mg tablet Take 1 tablet (500 mg total) by mouth daily at bedtime as needed for muscle spasms, Starting Mon 10/24/2022, Normal      Multiple Vitamin (multivitamin) tablet Take 1 tablet by mouth daily, Starting Sun 11/29/2020, Normal      !! naproxen (NAPROSYN) 500 mg tablet Take 1 tablet (500 mg total) by mouth 2 (two) times a day with meals, Starting Fri 11/19/2021, Normal      !! naproxen (Naprosyn) 500 mg tablet Take 1 tablet (500 mg total) by mouth 2 (two) times a day with meals, Starting Mon 3/7/2022, Normal      omeprazole (PriLOSEC) 20 mg delayed release capsule Take 1 capsule (20 mg total) by mouth daily, Starting Mon 10/24/2022, Normal      sertraline (Zoloft) 25 mg tablet Take 1 tablet (25 mg total) by mouth daily, Starting Mon 2/20/2023, Normal       !! - Potential duplicate medications found  Please discuss with provider  No discharge procedures on file      PDMP Review     None          ED Provider  Electronically Signed by           Thomas Christiansen DO  05/15/23 2957

## 2023-05-19 ENCOUNTER — TELEPHONE (OUTPATIENT)
Dept: FAMILY MEDICINE CLINIC | Facility: CLINIC | Age: 58
End: 2023-05-19

## 2023-05-19 DIAGNOSIS — R73.03 PRE-DIABETES: ICD-10-CM

## 2023-05-22 ENCOUNTER — OFFICE VISIT (OUTPATIENT)
Dept: FAMILY MEDICINE CLINIC | Facility: CLINIC | Age: 58
End: 2023-05-22

## 2023-05-22 VITALS
DIASTOLIC BLOOD PRESSURE: 70 MMHG | HEART RATE: 78 BPM | WEIGHT: 159 LBS | SYSTOLIC BLOOD PRESSURE: 116 MMHG | BODY MASS INDEX: 30.02 KG/M2 | RESPIRATION RATE: 18 BRPM | OXYGEN SATURATION: 98 % | HEIGHT: 61 IN | TEMPERATURE: 97.8 F

## 2023-05-22 DIAGNOSIS — F51.04 PSYCHOPHYSIOLOGICAL INSOMNIA: ICD-10-CM

## 2023-05-22 DIAGNOSIS — L24.89 IRRITANT CONTACT DERMATITIS DUE TO OTHER AGENTS: ICD-10-CM

## 2023-05-22 DIAGNOSIS — R73.03 PRE-DIABETES: Primary | ICD-10-CM

## 2023-05-22 DIAGNOSIS — F41.9 ANXIETY: ICD-10-CM

## 2023-05-22 RX ORDER — TRIAMCINOLONE ACETONIDE 1 MG/G
CREAM TOPICAL 2 TIMES DAILY
Qty: 30 G | Refills: 0 | Status: SHIPPED | OUTPATIENT
Start: 2023-05-22

## 2023-05-22 RX ORDER — ZINC OXIDE/COD LIVER OIL 40 %
1 PASTE (GRAM) TOPICAL 2 TIMES DAILY
Qty: 397 G | Refills: 0 | Status: SHIPPED | OUTPATIENT
Start: 2023-05-22

## 2023-05-22 NOTE — ASSESSMENT & PLAN NOTE
Reports quitting her job which was causing her stress   Melatonin currently working well   Continue with Melatonin

## 2023-05-22 NOTE — ASSESSMENT & PLAN NOTE
Unclear of irritant; however had recent episodes of diarrhea     Start Desitin lotion and triamcinolone 0 1% for 7 days

## 2023-05-22 NOTE — PROGRESS NOTES
Name: Amber Bobo      : 1965      MRN: [id number]  Encounter Provider: DIMITRI Tipton  Encounter Date: 2023   Encounter department: [address] [place] [address]     1  Pre-diabetes  Assessment & Plan:  Lab Results   Component Value Date    HGBA1C 5 7 2023     Patient requesting longer refill for metformin - 90 day supply provided   Has nutrition consult in August   Continue with metformin   A1C -future   Follow up in two months     Orders:  -     HEMOGLOBIN A1C W/ EAG ESTIMATION; Future; Expected date: 2023  -     metFORMIN (GLUCOPHAGE) 500 mg tablet; Take 1 tablet (500 mg total) by mouth daily with breakfast    2  Irritant contact dermatitis due to other agents  Assessment & Plan:  Unclear of irritant; however had recent episodes of diarrhea  Start Desitin lotion and triamcinolone 0 1% for 7 days    Orders:  -     triamcinolone (KENALOG) 0 1 % cream; Apply topically 2 (two) times a day  -     Diaper Rash Products (Desitin Multi-Purpose Healing) OINT; Apply 1 application  topically 2 (two) times a day    3  Psychophysiological insomnia  Assessment & Plan:  Reports quitting her job which was causing her stress   Melatonin currently working well   Continue with Melatonin       4  Anxiety  Assessment & Plan:  After quitting her job anxiety and stress have improved   Patient did not start Zoloft or hydroxyzine   Continue to monitor  Depression Screening and Follow-up Plan: Patient was screened for depression during today's encounter  They screened negative with a PHQ-2 score of 1  Subjective      Amber Page 65 y o  female  has no past medical history on file  Presenting today for follow up chronic conditions  Patient was seen recently in ED on  for weakness  Reports she was in DR after returning she started experiencing diarrhea, which made her dehydrated  Since the ED visit her symptoms have resolved   Denies fatigue, headaches, dizziness, blurred vision, nausea, palpitation, chest pain, SOB, urinary changes, weakness, bowel changes, sleep problems,  sick contacts, red flag signs,  or recent travel  Overall patient reports feeling well   Patient has no further complaints other than what is mentioned in the ROS  Rash  This is a recurrent problem  The current episode started more than 1 month ago  The problem has been waxing and waning since onset  Location: buttuck  The problem is mild  The rash is characterized by dryness and itchiness  It is unknown if there was an exposure to a precipitant  Associated symptoms include diarrhea (resolved) and itching  Pertinent negatives include no anorexia, congestion, cough, decreased physical activity, fatigue, fever, shortness of breath or vomiting  Past treatments include nothing  The treatment provided no relief  Review of Systems   Constitutional: Negative for fatigue and fever  HENT: Negative for congestion and ear pain  Eyes: Negative for pain and visual disturbance  Respiratory: Negative for cough and shortness of breath  Cardiovascular: Negative for palpitations  Gastrointestinal: Positive for diarrhea (resolved)  Negative for anorexia and vomiting  Genitourinary: Negative for dysuria and hematuria  Musculoskeletal: Negative for back pain  Skin: Positive for itching and rash  Negative for color change  Neurological: Negative for seizures and syncope  All other systems reviewed and are negative        Current Outpatient Medications on File Prior to Visit   Medication Sig   • acetaminophen (TYLENOL) 500 mg tablet Take 1 tablet (500 mg total) by mouth every 6 (six) hours as needed for moderate pain   • al mag oxide-diphenhydramine-lidocaine viscous (MAGIC MOUTHWASH) 1:1:1 suspension Swish and spit 10 mL every 4 (four) hours as needed for mouth pain or discomfort   • Ascorbic Acid (vitamin C) 1000 MG tablet Take 1 tablet (1,000 mg total) by mouth "every 12 (twelve) hours   • Calcium Carb-Cholecalciferol (Oyster Shell Calcium w/D) 500-200 MG-UNIT TABS No    • calcium carbonate-vitamin D (OSCAL-D) 500 mg-200 units per tablet Take 1 tablet by mouth daily with breakfast   • cetirizine (ZyrTEC) 10 mg tablet Take 1 tablet (10 mg total) by mouth daily   • cholecalciferol (VITAMIN D3) 1,000 units tablet Take 2 tablets (2,000 Units total) by mouth daily   • fluticasone (FLONASE) 50 mcg/act nasal spray 1 spray into each nostril daily   • hydrOXYzine HCL (ATARAX) 10 mg tablet Take 1 tablet (10 mg total) by mouth every 6 (six) hours as needed for itching   • lidocaine (LIDODERM) 5 % Apply 1 patch topically daily Remove & Discard patch within 12 hours or as directed by MD   • Melatonin 500 MCG TBDP Take 1 tablet (500 mcg total) by mouth daily at bedtime as needed (insomnia)   • menthol-cetylpyridinium (CEPACOL) 3 MG lozenge Take 1 lozenge (3 mg total) by mouth as needed for sore throat   • methocarbamol (ROBAXIN) 500 mg tablet Take 1 tablet (500 mg total) by mouth daily at bedtime as needed for muscle spasms   • Multiple Vitamin (multivitamin) tablet Take 1 tablet by mouth daily   • naproxen (NAPROSYN) 500 mg tablet Take 1 tablet (500 mg total) by mouth 2 (two) times a day with meals   • naproxen (Naprosyn) 500 mg tablet Take 1 tablet (500 mg total) by mouth 2 (two) times a day with meals   • omeprazole (PriLOSEC) 20 mg delayed release capsule Take 1 capsule (20 mg total) by mouth daily   • [DISCONTINUED] metFORMIN (GLUCOPHAGE) 500 mg tablet Take 1 tablet (500 mg total) by mouth daily with breakfast   • [DISCONTINUED] sertraline (Zoloft) 25 mg tablet Take 1 tablet (25 mg total) by mouth daily       Objective     /70 (BP Location: Left arm, Patient Position: Sitting, Cuff Size: Standard)   Pulse 78   Temp 97 8 °F (36 6 °C) (Temporal)   Resp 18   Ht 5' 1\" (1 549 m)   Wt 72 1 kg (159 lb)   SpO2 98%   BMI 30 04 kg/m²     Physical Exam  Vitals and nursing note " reviewed  Constitutional:       General: She is not in acute distress  Appearance: Normal appearance  She is not ill-appearing  HENT:      Head: Normocephalic and atraumatic  Right Ear: External ear normal       Left Ear: External ear normal       Nose: Nose normal       Mouth/Throat:      Mouth: Mucous membranes are moist    Eyes:      General:         Right eye: No discharge  Left eye: No discharge  Pupils: Pupils are equal, round, and reactive to light  Cardiovascular:      Rate and Rhythm: Normal rate and regular rhythm  Pulses: Normal pulses  Heart sounds: Normal heart sounds  Pulmonary:      Effort: Pulmonary effort is normal  No respiratory distress  Breath sounds: Normal breath sounds  No wheezing  Abdominal:      General: Bowel sounds are normal       Palpations: Abdomen is soft  Tenderness: There is no abdominal tenderness  There is no right CVA tenderness or left CVA tenderness  Musculoskeletal:         General: Normal range of motion  Cervical back: Normal range of motion  Skin:     General: Skin is warm and dry  Findings: Rash present  Neurological:      General: No focal deficit present  Mental Status: She is alert and oriented to person, place, and time         DIMITRI Alarcon

## 2023-05-22 NOTE — ASSESSMENT & PLAN NOTE
After quitting her job anxiety and stress have improved   Patient did not start Zoloft or hydroxyzine   Continue to monitor

## 2023-05-22 NOTE — ASSESSMENT & PLAN NOTE
Lab Results   Component Value Date    HGBA1C 5 7 02/20/2023     Patient requesting longer refill for metformin - 90 day supply provided   Has nutrition consult in August   Continue with metformin   A1C -future   Follow up in two months

## 2023-07-27 ENCOUNTER — HOSPITAL ENCOUNTER (EMERGENCY)
Facility: HOSPITAL | Age: 58
Discharge: HOME | End: 2023-07-27
Attending: EMERGENCY MEDICINE
Payer: COMMERCIAL

## 2023-07-27 ENCOUNTER — APPOINTMENT (EMERGENCY)
Dept: RADIOLOGY | Facility: HOSPITAL | Age: 58
End: 2023-07-27
Payer: COMMERCIAL

## 2023-07-27 VITALS
BODY MASS INDEX: 30.39 KG/M2 | OXYGEN SATURATION: 98 % | TEMPERATURE: 98.5 F | SYSTOLIC BLOOD PRESSURE: 107 MMHG | HEIGHT: 61 IN | RESPIRATION RATE: 16 BRPM | DIASTOLIC BLOOD PRESSURE: 77 MMHG | HEART RATE: 70 BPM | WEIGHT: 160.94 LBS

## 2023-07-27 DIAGNOSIS — R07.89 CHEST WALL PAIN: Primary | ICD-10-CM

## 2023-07-27 LAB
ALBUMIN SERPL BCP-MCNC: 3.8 G/DL (ref 3.5–5)
ALP SERPL-CCNC: 62 U/L (ref 34–104)
ALT SERPL W P-5'-P-CCNC: 23 U/L (ref 7–52)
ANION GAP SERPL CALCULATED.3IONS-SCNC: 5 MMOL/L
AST SERPL W P-5'-P-CCNC: 18 U/L (ref 13–39)
ATRIAL RATE: 69 BPM
BASOPHILS # BLD AUTO: 0.04 THOUSANDS/ÂΜL (ref 0–0.1)
BASOPHILS NFR BLD AUTO: 1 % (ref 0–1)
BILIRUB SERPL-MCNC: 0.32 MG/DL (ref 0.2–1)
BUN SERPL-MCNC: 17 MG/DL (ref 5–25)
CALCIUM SERPL-MCNC: 8.7 MG/DL (ref 8.4–10.2)
CARDIAC TROPONIN I PNL SERPL HS: 3 NG/L
CHLORIDE SERPL-SCNC: 106 MMOL/L (ref 96–108)
CO2 SERPL-SCNC: 28 MMOL/L (ref 21–32)
CREAT SERPL-MCNC: 0.87 MG/DL (ref 0.6–1.3)
EOSINOPHIL # BLD AUTO: 0.11 THOUSAND/ÂΜL (ref 0–0.61)
EOSINOPHIL NFR BLD AUTO: 2 % (ref 0–6)
ERYTHROCYTE [DISTWIDTH] IN BLOOD BY AUTOMATED COUNT: 14.6 % (ref 11.6–15.1)
GFR SERPL CREATININE-BSD FRML MDRD: 74 ML/MIN/1.73SQ M
GLUCOSE SERPL-MCNC: 95 MG/DL (ref 65–140)
HCT VFR BLD AUTO: 38 % (ref 34.8–46.1)
HGB BLD-MCNC: 11.8 G/DL (ref 11.5–15.4)
IMM GRANULOCYTES # BLD AUTO: 0.01 THOUSAND/UL (ref 0–0.2)
IMM GRANULOCYTES NFR BLD AUTO: 0 % (ref 0–2)
LYMPHOCYTES # BLD AUTO: 3.23 THOUSANDS/ÂΜL (ref 0.6–4.47)
LYMPHOCYTES NFR BLD AUTO: 46 % (ref 14–44)
MCH RBC QN AUTO: 26.5 PG (ref 26.8–34.3)
MCHC RBC AUTO-ENTMCNC: 31.1 G/DL (ref 31.4–37.4)
MCV RBC AUTO: 85 FL (ref 82–98)
MONOCYTES # BLD AUTO: 0.77 THOUSAND/ÂΜL (ref 0.17–1.22)
MONOCYTES NFR BLD AUTO: 11 % (ref 4–12)
NEUTROPHILS # BLD AUTO: 2.78 THOUSANDS/ÂΜL (ref 1.85–7.62)
NEUTS SEG NFR BLD AUTO: 40 % (ref 43–75)
NRBC BLD AUTO-RTO: 0 /100 WBCS
P AXIS: 4 DEGREES
PLATELET # BLD AUTO: 299 THOUSANDS/UL (ref 149–390)
PMV BLD AUTO: 10.5 FL (ref 8.9–12.7)
POTASSIUM SERPL-SCNC: 3.8 MMOL/L (ref 3.5–5.3)
PR INTERVAL: 134 MS
PROT SERPL-MCNC: 6.6 G/DL (ref 6.4–8.4)
QRS AXIS: 43 DEGREES
QRSD INTERVAL: 78 MS
QT INTERVAL: 394 MS
QTC INTERVAL: 422 MS
RBC # BLD AUTO: 4.45 MILLION/UL (ref 3.81–5.12)
SODIUM SERPL-SCNC: 139 MMOL/L (ref 135–147)
T WAVE AXIS: -9 DEGREES
VENTRICULAR RATE: 69 BPM
WBC # BLD AUTO: 6.94 THOUSAND/UL (ref 4.31–10.16)

## 2023-07-27 PROCEDURE — 84484 ASSAY OF TROPONIN QUANT: CPT | Performed by: EMERGENCY MEDICINE

## 2023-07-27 PROCEDURE — 36415 COLL VENOUS BLD VENIPUNCTURE: CPT | Performed by: EMERGENCY MEDICINE

## 2023-07-27 PROCEDURE — 93010 ELECTROCARDIOGRAM REPORT: CPT | Performed by: INTERNAL MEDICINE

## 2023-07-27 PROCEDURE — 93005 ELECTROCARDIOGRAM TRACING: CPT

## 2023-07-27 PROCEDURE — 71046 X-RAY EXAM CHEST 2 VIEWS: CPT

## 2023-07-27 PROCEDURE — 80053 COMPREHEN METABOLIC PANEL: CPT | Performed by: EMERGENCY MEDICINE

## 2023-07-27 PROCEDURE — 85025 COMPLETE CBC W/AUTO DIFF WBC: CPT | Performed by: EMERGENCY MEDICINE

## 2023-07-27 PROCEDURE — 99284 EMERGENCY DEPT VISIT MOD MDM: CPT | Performed by: EMERGENCY MEDICINE

## 2023-07-27 RX ORDER — KETOROLAC TROMETHAMINE 30 MG/ML
15 INJECTION, SOLUTION INTRAMUSCULAR; INTRAVENOUS ONCE
Status: COMPLETED | OUTPATIENT
Start: 2023-07-27 | End: 2023-07-27

## 2023-07-27 RX ADMIN — KETOROLAC TROMETHAMINE 15 MG: 30 INJECTION, SOLUTION INTRAMUSCULAR; INTRAVENOUS at 19:42

## 2023-07-27 NOTE — ED PROVIDER NOTES
History  Chief Complaint   Patient presents with   • Chest Pain     Chest pain x 1 week, worse when she bends forward or takes a deep breath,nausea this morning     HPI     69-year-old female with past medical history of prediabetes who presents for evaluation of chest pain. Patient was offered  services but declined and used her daughter as . Patient states she has been having intermittent chest pain for the past week. She states pain is located in the center of her chest with occasional radiation to the back and shoulders. She describes the pain as a sharp pain. She states pain is worse when she bends forward. Denies any worsening of pain with exertion. Denies any worsening of pain with taking a deep breath. She states pain is worse when she pushes on her chest.  Denies any associated lightheadedness, dizziness, shortness of breath, cough, congestion, fevers, or chills. Denies abdominal pain. She states she has had occasional nausea but no vomiting. Denies diarrhea. Denies leg pain or leg swelling. Denies history of DVT or PE. Prior to Admission Medications   Prescriptions Last Dose Informant Patient Reported? Taking? Ascorbic Acid (vitamin C) 1000 MG tablet   No No   Sig: Take 1 tablet (1,000 mg total) by mouth every 12 (twelve) hours   Calcium Carb-Cholecalciferol (Oyster Shell Calcium w/D) 500-200 MG-UNIT TABS No   Yes No   Diaper Rash Products (Desitin Multi-Purpose Healing) OINT   No No   Sig: Apply 1 application.  topically 2 (two) times a day   Melatonin 500 MCG TBDP   No No   Sig: Take 1 tablet (500 mcg total) by mouth daily at bedtime as needed (insomnia)   Multiple Vitamin (multivitamin) tablet   No No   Sig: Take 1 tablet by mouth daily   acetaminophen (TYLENOL) 500 mg tablet   No No   Sig: Take 1 tablet (500 mg total) by mouth every 6 (six) hours as needed for moderate pain   al mag oxide-diphenhydramine-lidocaine viscous (MAGIC MOUTHWASH) 1:1:1 suspension   No No Sig: Swish and spit 10 mL every 4 (four) hours as needed for mouth pain or discomfort   calcium carbonate-vitamin D (OSCAL-D) 500 mg-200 units per tablet   No No   Sig: Take 1 tablet by mouth daily with breakfast   cetirizine (ZyrTEC) 10 mg tablet   No No   Sig: Take 1 tablet (10 mg total) by mouth daily   cholecalciferol (VITAMIN D3) 1,000 units tablet   No No   Sig: Take 2 tablets (2,000 Units total) by mouth daily   fluticasone (FLONASE) 50 mcg/act nasal spray   No No   Si spray into each nostril daily   hydrOXYzine HCL (ATARAX) 10 mg tablet   No No   Sig: Take 1 tablet (10 mg total) by mouth every 6 (six) hours as needed for itching   lidocaine (LIDODERM) 5 %   No No   Sig: Apply 1 patch topically daily Remove & Discard patch within 12 hours or as directed by MD   menthol-cetylpyridinium (CEPACOL) 3 MG lozenge   No No   Sig: Take 1 lozenge (3 mg total) by mouth as needed for sore throat   metFORMIN (GLUCOPHAGE) 500 mg tablet   No No   Sig: Take 1 tablet (500 mg total) by mouth daily with breakfast   methocarbamol (ROBAXIN) 500 mg tablet   No No   Sig: Take 1 tablet (500 mg total) by mouth daily at bedtime as needed for muscle spasms   naproxen (NAPROSYN) 500 mg tablet   No No   Sig: Take 1 tablet (500 mg total) by mouth 2 (two) times a day with meals   naproxen (Naprosyn) 500 mg tablet   No No   Sig: Take 1 tablet (500 mg total) by mouth 2 (two) times a day with meals   omeprazole (PriLOSEC) 20 mg delayed release capsule   No No   Sig: Take 1 capsule (20 mg total) by mouth daily   triamcinolone (KENALOG) 0.1 % cream   No No   Sig: Apply topically 2 (two) times a day      Facility-Administered Medications: None       History reviewed. No pertinent past medical history.     Past Surgical History:   Procedure Laterality Date   • BILATERAL OOPHORECTOMY Bilateral    • BREAST BIOPSY Left    •  SECTION      x2       Family History   Problem Relation Age of Onset   • No Known Problems Mother    • Liver cancer Father    • Pancreatic cancer Father    • No Known Problems Sister    • No Known Problems Sister    • No Known Problems Sister    • No Known Problems Sister    • No Known Problems Daughter    • No Known Problems Daughter    • Vaginal cancer Maternal Grandmother    • Uterine cancer Maternal Grandmother    • Breast cancer Paternal Aunt      I have reviewed and agree with the history as documented. E-Cigarette/Vaping   • E-Cigarette Use Never User      E-Cigarette/Vaping Substances   • Nicotine No    • THC No    • CBD No    • Flavoring No    • Other No    • Unknown No      Social History     Tobacco Use   • Smoking status: Never     Passive exposure: Never   • Smokeless tobacco: Never   Vaping Use   • Vaping Use: Never used   Substance Use Topics   • Alcohol use: Not Currently   • Drug use: Never       Review of Systems   Constitutional: Negative for appetite change, chills and fever. HENT: Negative for congestion, rhinorrhea and sore throat. Respiratory: Negative for cough and shortness of breath. Cardiovascular: Positive for chest pain. Negative for leg swelling. Gastrointestinal: Positive for nausea. Negative for abdominal pain, diarrhea and vomiting. Musculoskeletal: Negative for arthralgias and myalgias. Skin: Negative for rash. Neurological: Negative for dizziness, weakness, light-headedness, numbness and headaches. All other systems reviewed and are negative. Physical Exam  Physical Exam  Vitals and nursing note reviewed. Constitutional:       General: She is not in acute distress. Appearance: Normal appearance. She is well-developed. She is obese. She is not ill-appearing, toxic-appearing or diaphoretic. HENT:      Head: Normocephalic and atraumatic. Right Ear: External ear normal.      Left Ear: External ear normal.      Nose: Nose normal.      Mouth/Throat:      Mouth: Mucous membranes are moist.      Pharynx: Oropharynx is clear.    Eyes:      Extraocular Movements: Extraocular movements intact. Conjunctiva/sclera: Conjunctivae normal.   Cardiovascular:      Rate and Rhythm: Normal rate and regular rhythm. Pulses: Normal pulses. Heart sounds: Normal heart sounds. No murmur heard. No friction rub. No gallop. Pulmonary:      Effort: Pulmonary effort is normal. No respiratory distress. Breath sounds: Normal breath sounds. No decreased breath sounds, wheezing, rhonchi or rales. Chest:      Chest wall: Tenderness present. No mass. Abdominal:      General: There is no distension. Palpations: Abdomen is soft. Tenderness: There is no abdominal tenderness. There is no guarding or rebound. Musculoskeletal:         General: No tenderness. Cervical back: Neck supple. Right lower leg: No tenderness. No edema. Left lower leg: No tenderness. No edema. Skin:     General: Skin is warm and dry. Coloration: Skin is not pale. Findings: No erythema or rash. Neurological:      General: No focal deficit present. Mental Status: She is alert and oriented to person, place, and time. Cranial Nerves: No cranial nerve deficit. Sensory: No sensory deficit. Motor: No weakness.    Psychiatric:         Mood and Affect: Mood normal.         Behavior: Behavior normal.         Vital Signs  ED Triage Vitals [07/27/23 1723]   Temperature Pulse Respirations Blood Pressure SpO2   98.5 °F (36.9 °C) 70 16 107/77 98 %      Temp src Heart Rate Source Patient Position - Orthostatic VS BP Location FiO2 (%)   -- -- -- -- --      Pain Score       8           Vitals:    07/27/23 1723   BP: 107/77   Pulse: 70         Visual Acuity      ED Medications  Medications   ketorolac (TORADOL) injection 15 mg (15 mg Intravenous Given 7/27/23 1942)       Diagnostic Studies  Results Reviewed     Procedure Component Value Units Date/Time    HS Troponin 0hr (reflex protocol) [[id number]]  (Normal) Collected: 07/27/23 9209    Lab Status: Final result Specimen: Blood from Arm, Right Updated: 07/27/23 1837     hs TnI 0hr 3 ng/L     Comprehensive metabolic panel [[id number]] Collected: 07/27/23 1759    Lab Status: Final result Specimen: Blood from Arm, Right Updated: 07/27/23 1829     Sodium 139 mmol/L      Potassium 3.8 mmol/L      Chloride 106 mmol/L      CO2 28 mmol/L      ANION GAP 5 mmol/L      BUN 17 mg/dL      Creatinine 0.87 mg/dL      Glucose 95 mg/dL      Calcium 8.7 mg/dL      AST 18 U/L      ALT 23 U/L      Alkaline Phosphatase 62 U/L      Total Protein 6.6 g/dL      Albumin 3.8 g/dL      Total Bilirubin 0.32 mg/dL      eGFR 74 ml/min/1.73sq m     Narrative:      [place] guidelines for Chronic Kidney Disease (CKD):   •  Stage 1 with normal or high GFR (GFR > 90 mL/min/1.73 square meters)  •  Stage 2 Mild CKD (GFR = 60-89 mL/min/1.73 square meters)  •  Stage 3A Moderate CKD (GFR = 45-59 mL/min/1.73 square meters)  •  Stage 3B Moderate CKD (GFR = 30-44 mL/min/1.73 square meters)  •  Stage 4 Severe CKD (GFR = 15-29 mL/min/1.73 square meters)  •  Stage 5 End Stage CKD (GFR <15 mL/min/1.73 square meters)  Note: GFR calculation is accurate only with a steady state creatinine    CBC and differential [[id number]]  (Abnormal) Collected: 07/27/23 1759    Lab Status: Final result Specimen: Blood from Arm, Right Updated: 07/27/23 1807     WBC 6.94 Thousand/uL      RBC 4.45 Million/uL      Hemoglobin 11.8 g/dL      Hematocrit 38.0 %      MCV 85 fL      MCH 26.5 pg      MCHC 31.1 g/dL      RDW 14.6 %      MPV 10.5 fL      Platelets 091 Thousands/uL      nRBC 0 /100 WBCs      Neutrophils Relative 40 %      Immat GRANS % 0 %      Lymphocytes Relative 46 %      Monocytes Relative 11 %      Eosinophils Relative 2 %      Basophils Relative 1 %      Neutrophils Absolute 2.78 Thousands/µL      Immature Grans Absolute 0.01 Thousand/uL      Lymphocytes Absolute 3.23 Thousands/µL      Monocytes Absolute 0.77 Thousand/µL      Eosinophils Absolute 0.11 Thousand/µL      Basophils Absolute 0.04 Thousands/µL                  XR chest 2 views   ED Interpretation by Newton Atkins MD (07/27 1922)   No acute cardiopulmonary disease      Final Result by Sohail Wu MD ([id number])      No acute cardiopulmonary disease. Findings are stable            Workstation performed: [id number]                    Procedures  Procedures         ED Course             HEART Risk Score    Flowsheet Row Most Recent Value   Heart Score Risk Calculator    History 0 Filed at: 07/27/2023 1920   ECG 0 Filed at: 07/27/2023 1920   Age 1 Filed at: 07/27/2023 1920   Risk Factors 1 Filed at: 07/27/2023 1920   Troponin 0 Filed at: 07/27/2023 1920   HEART Score 2 Filed at: 07/27/2023 1920                        SBIRT 20yo+    Flowsheet Row Most Recent Value   Initial Alcohol Screen: US AUDIT-C     1. How often do you have a drink containing alcohol? 0 Filed at: 07/27/2023 1908   2. How many drinks containing alcohol do you have on a typical day you are drinking? 0 Filed at: 07/27/2023 1908   3a. Male UNDER 65: How often do you have five or more drinks on one occasion? 0 Filed at: 07/27/2023 1908   3b. FEMALE Any Age, or MALE 65+: How often do you have 4 or more drinks on one occassion? 0 Filed at: 07/27/2023 1908   Audit-C Score 0 Filed at: 07/27/2023 1198   AVANI: How many times in the past year have you. .. Used an illegal drug or used a prescription medication for non-medical reasons? Never Filed at: 07/27/2023 1908                    MDM     45-year-old female with past medical history of prediabetes who presents for evaluation of chest pain, intermittent for one week, worse with palpation and with bending over. Differential diagnosis includes: Musculoskeletal pain, pericarditis, ACS, pleural effusion, pneumothorax, GERD. Will check CBC to evaluate for anemia, CMP to evaluate renal function, liver function, and electrolytes.   Will check EKG and troponin to evaluate for myocarditis, ACS, or arrhythmia. Will check chest x-ray to evaluate for pleural effusion or pneumothorax. Reviewed labs, no marked abnormalities. Trop negative. EKG reviewed and interpreted, shows normal sinus rhythm without acute ischemic changes. CXR reviewed and interpreted by me, no acute cardiopulmonary disease. Reassessed patient, will treat with toradol for likely msk pain. Will have patient follow up with her primary care doctor within 2-3 days. Discussed with patient strict return precautions. Patient expressed understanding and was agreeable for discharge. Disposition  Final diagnoses:   Chest wall pain     Time reflects when diagnosis was documented in both MDM as applicable and the Disposition within this note     Time User Action Codes Description Comment    7/27/2023  7:29 PM Lyudmila Fernandez Add [R07.89] Chest wall pain       ED Disposition     ED Disposition   Discharge    Condition   Stable    Date/Time   Thu Jul 27, 2023  7:20 PM    Comment   Patrick Doll discharge to home/self care.                Follow-up Information     Follow up With Specialties Details Why DIMITRI Andrade Nurse Practitioner, [address] [place] [address]  [phone]            Discharge Medication List as of 7/27/2023  7:43 PM      CONTINUE these medications which have NOT CHANGED    Details   acetaminophen (TYLENOL) 500 mg tablet Take 1 tablet (500 mg total) by mouth every 6 (six) hours as needed for moderate pain, Starting Tue 12/13/2022, Normal      al mag oxide-diphenhydramine-lidocaine viscous (MAGIC MOUTHWASH) 1:1:1 suspension Swish and spit 10 mL every 4 (four) hours as needed for mouth pain or discomfort, Starting Tue 10/25/2022, Normal      Ascorbic Acid (vitamin C) 1000 MG tablet Take 1 tablet (1,000 mg total) by mouth every 12 (twelve) hours, Starting Sun 11/29/2020, Normal      Calcium Carb-Cholecalciferol (Oyster Shell Calcium w/D) 500-200 MG-UNIT TABS No Starting Mon 1/31/2022, Historical Med      calcium carbonate-vitamin D (OSCAL-D) 500 mg-200 units per tablet Take 1 tablet by mouth daily with breakfast, Starting Thu 12/16/2021, Normal      cetirizine (ZyrTEC) 10 mg tablet Take 1 tablet (10 mg total) by mouth daily, Starting Fri 10/1/2021, Normal      cholecalciferol (VITAMIN D3) 1,000 units tablet Take 2 tablets (2,000 Units total) by mouth daily, Starting Sun 11/29/2020, Normal      Diaper Rash Products (Desitin Multi-Purpose Healing) OINT Apply 1 application.  topically 2 (two) times a day, Starting Mon 5/22/2023, Normal      fluticasone (FLONASE) 50 mcg/act nasal spray 1 spray into each nostril daily, Starting Fri 10/1/2021, Normal      hydrOXYzine HCL (ATARAX) 10 mg tablet Take 1 tablet (10 mg total) by mouth every 6 (six) hours as needed for itching, Starting Mon 2/20/2023, Normal      lidocaine (LIDODERM) 5 % Apply 1 patch topically daily Remove & Discard patch within 12 hours or as directed by MD, Starting Mon 10/24/2022, Normal      Melatonin 500 MCG TBDP Take 1 tablet (500 mcg total) by mouth daily at bedtime as needed (insomnia), Starting Mon 2/20/2023, OTC      menthol-cetylpyridinium (CEPACOL) 3 MG lozenge Take 1 lozenge (3 mg total) by mouth as needed for sore throat, Starting Tue 11/1/2022, Normal      metFORMIN (GLUCOPHAGE) 500 mg tablet Take 1 tablet (500 mg total) by mouth daily with breakfast, Starting Mon 5/22/2023, Until Sun 8/20/2023, Normal      methocarbamol (ROBAXIN) 500 mg tablet Take 1 tablet (500 mg total) by mouth daily at bedtime as needed for muscle spasms, Starting Mon 10/24/2022, Normal      Multiple Vitamin (multivitamin) tablet Take 1 tablet by mouth daily, Starting Sun 11/29/2020, Normal      !! naproxen (NAPROSYN) 500 mg tablet Take 1 tablet (500 mg total) by mouth 2 (two) times a day with meals, Starting Fri 11/19/2021, Normal      !! naproxen (Naprosyn) 500 mg tablet Take 1 tablet (500 mg total) by mouth 2 (two) times a day with meals, Starting Mon 3/7/2022, Normal      omeprazole (PriLOSEC) 20 mg delayed release capsule Take 1 capsule (20 mg total) by mouth daily, Starting Mon 10/24/2022, Normal      triamcinolone (KENALOG) 0.1 % cream Apply topically 2 (two) times a day, Starting Mon 5/22/2023, Normal       !! - Potential duplicate medications found. Please discuss with provider. No discharge procedures on file.     PDMP Review     None          ED Provider  Electronically Signed by           Zoey Edmondson MD  07/28/23 4690

## 2023-07-27 NOTE — DISCHARGE INSTRUCTIONS
Please follow up with your primary care doctor within 3-5 days. Return to the ER if you develop severe or worsening chest pain, shortness of breath, lightheadedness or other concerning symptoms.
Home

## 2023-07-30 ENCOUNTER — LAB (OUTPATIENT)
Dept: LAB | Facility: HOSPITAL | Age: 58
End: 2023-07-30
Payer: COMMERCIAL

## 2023-07-30 DIAGNOSIS — R73.03 PRE-DIABETES: ICD-10-CM

## 2023-07-30 LAB
EST. AVERAGE GLUCOSE BLD GHB EST-MCNC: 120 MG/DL
HBA1C MFR BLD: 5.8 %

## 2023-07-30 PROCEDURE — 36415 COLL VENOUS BLD VENIPUNCTURE: CPT

## 2023-07-30 PROCEDURE — 83036 HEMOGLOBIN GLYCOSYLATED A1C: CPT

## 2023-08-07 ENCOUNTER — OFFICE VISIT (OUTPATIENT)
Dept: FAMILY MEDICINE CLINIC | Facility: CLINIC | Age: 58
End: 2023-08-07

## 2023-08-07 VITALS
DIASTOLIC BLOOD PRESSURE: 80 MMHG | BODY MASS INDEX: 29.76 KG/M2 | OXYGEN SATURATION: 97 % | WEIGHT: 157.6 LBS | HEIGHT: 61 IN | TEMPERATURE: 99 F | SYSTOLIC BLOOD PRESSURE: 122 MMHG | RESPIRATION RATE: 19 BRPM | HEART RATE: 75 BPM

## 2023-08-07 DIAGNOSIS — Z76.0 MEDICATION REFILL: ICD-10-CM

## 2023-08-07 DIAGNOSIS — R73.03 PREDIABETES: ICD-10-CM

## 2023-08-07 DIAGNOSIS — M25.50 CHRONIC PAIN OF MULTIPLE JOINTS: ICD-10-CM

## 2023-08-07 DIAGNOSIS — G89.29 CHRONIC PAIN OF MULTIPLE JOINTS: ICD-10-CM

## 2023-08-07 DIAGNOSIS — Z12.31 ENCOUNTER FOR SCREENING MAMMOGRAM FOR BREAST CANCER: ICD-10-CM

## 2023-08-07 DIAGNOSIS — J32.9 CHRONIC SINUSITIS, UNSPECIFIED LOCATION: Primary | ICD-10-CM

## 2023-08-07 PROCEDURE — 99214 OFFICE O/P EST MOD 30 MIN: CPT

## 2023-08-07 RX ORDER — FLUTICASONE PROPIONATE 50 MCG
1 SPRAY, SUSPENSION (ML) NASAL DAILY
Qty: 11.1 ML | Refills: 1 | Status: SHIPPED | OUTPATIENT
Start: 2023-08-07

## 2023-08-07 RX ORDER — BROMPHENIRAMINE MALEATE, PSEUDOEPHEDRINE HYDROCHLORIDE, AND DEXTROMETHORPHAN HYDROBROMIDE 2; 30; 10 MG/5ML; MG/5ML; MG/5ML
5 SYRUP ORAL 4 TIMES DAILY PRN
Qty: 120 ML | Refills: 0 | Status: SHIPPED | OUTPATIENT
Start: 2023-08-07

## 2023-08-07 RX ORDER — OMEPRAZOLE 20 MG/1
20 CAPSULE, DELAYED RELEASE ORAL DAILY
Qty: 90 CAPSULE | Refills: 1 | Status: SHIPPED | OUTPATIENT
Start: 2023-08-07

## 2023-08-07 RX ORDER — CETIRIZINE HYDROCHLORIDE 10 MG/1
10 TABLET ORAL DAILY
Qty: 30 TABLET | Refills: 1 | Status: SHIPPED | OUTPATIENT
Start: 2023-08-07

## 2023-08-07 NOTE — ASSESSMENT & PLAN NOTE
Chronic sinus problem/congestion, pt well appearing denies fever/chill/fatigue no signs of acute infection at this time   Continue with Flonase and zyrtec   Start bromfed prn   amb ref to ENT -future  CT sinus -future

## 2023-08-07 NOTE — PROGRESS NOTES
Name: Cintia Valdez      : 1965      MRN: [id number]  Encounter Provider: DIMITRI Bartlett  Encounter Date: 2023   Encounter department: [facility]     1. Chronic sinusitis, unspecified location  Assessment & Plan:  Chronic sinus problem/congestion, pt well appearing denies fever/chill/fatigue no signs of acute infection at this time   Continue with Flonase and zyrtec   Start bromfed prn   amb ref to ENT -future  CT sinus -future     Orders:  -     Ambulatory Referral to Otolaryngology; Future  -     brompheniramine-pseudoephedrine-DM 30-2-10 MG/5ML syrup; Take 5 mL by mouth 4 (four) times a day as needed for congestion or allergies  -     CT sinus wo contrast; Future; Expected date: 2023  -     cetirizine (ZyrTEC) 10 mg tablet; Take 1 tablet (10 mg total) by mouth daily  -     fluticasone (FLONASE) 50 mcg/act nasal spray; 1 spray into each nostril daily    2. Chronic pain of multiple joints  Assessment & Plan:  Multiple chronic joint pain of blt knee, shoulders and hips ;denies injury worse over the past few months   Will get autoimmune studies, most likely fibromyalgia related. Pt interested in seeing chiropractor - referral placed   Start diclofenac bid for pain relief   Follow up in three months or prn     Orders:  -     Ambulatory Referral to Chiropractic; Future  -     RF Screen w/ Reflex to Titer; Future  -     Cyclic citrul peptide antibody, IgG; Future  -     OH Screen w/ Reflex to Titer/Pattern; Future  -     C-reactive protein; Future  -     diclofenac sodium (VOLTAREN) 50 mg EC tablet; Take 1 tablet (50 mg total) by mouth 2 (two) times a day    3. Medication refill  -     omeprazole (PriLOSEC) 20 mg delayed release capsule; Take 1 capsule (20 mg total) by mouth daily    4. Encounter for screening mammogram for breast cancer  -     Mammo screening bilateral w 3d & cad; Future; Expected date: 2023    5. Prediabetes  Assessment & Plan:  Lab Results   Component Value Date    HGBA1C 5.8 (H) 07/30/2023     Repeat A1c in three months prior to next visit    Orders:  -     Hemoglobin A1C; Future; Expected date: 11/07/2023         Lasha Berg 65 y.o. female has no past medical history on file. Presenting today for follow up chronic conditions. Pt seen in DR in [facility] for chronic sinusitis was prescribed antibiotics and nasal drops for 10 days pt reports continued congestion denies fever, chills, fatigue. Shortly after completion of antibiotics she started experiencing worsening multiple joint pain; currently not taking any pain relieving medications. Denies fatigue, headaches, dizziness, blurred vision, nausea, palpitation, chest pain, SOB, urinary changes, weakness, bowel changes, sleep problems,  sick contacts, red flag signs,  or recent travel. Alyssa Greene patient reports feeling well. Hair Garcia has no further complaints other than what is mentioned in the ROS. Sinus Problem  This is a chronic problem. The current episode started more than 1 month ago. The problem has been waxing and waning since onset. There has been no fever. Her pain is at a severity of 0/10. She is experiencing no pain. Associated symptoms include congestion and sinus pressure. Pertinent negatives include no chills, coughing, ear pain, headaches, shortness of breath or sore throat. Past treatments include nothing. The treatment provided no relief. Generalized Body Aches  The current episode started more than 1 month ago. The problem occurs daily. The problem has been waxing and waning since onset. The pain is mild. The symptoms are aggravated by activity. Associated symptoms include congestion, joint pain and muscle aches.  Pertinent negatives include no decreased vision, double vision, ear pain, eye itching, eye pain, headaches, photophobia, sore throat, fever, weight gain, weight loss, chest pain, coughing, shortness of breath, abdominal pain, vomiting, neck stiffness or rash. Past treatments include nothing. The treatment provided no relief. The congestion interferes with sleep. The congestion does not interfere with eating or drinking. Review of Systems   Constitutional: Negative for chills, fever, weight gain and weight loss. HENT: Positive for congestion and sinus pressure. Negative for ear pain and sore throat. Eyes: Negative for double vision, photophobia, pain, itching and visual disturbance. Respiratory: Negative for cough and shortness of breath. Cardiovascular: Negative for chest pain and palpitations. Gastrointestinal: Negative for abdominal pain and vomiting. Genitourinary: Negative for dysuria and hematuria. Musculoskeletal: Positive for arthralgias (chronic) and joint pain. Skin: Negative for color change and rash. Neurological: Negative for seizures, syncope and headaches. All other systems reviewed and are negative. Current Outpatient Medications on File Prior to Visit   Medication Sig   • acetaminophen (TYLENOL) 500 mg tablet Take 1 tablet (500 mg total) by mouth every 6 (six) hours as needed for moderate pain   • al mag oxide-diphenhydramine-lidocaine viscous (MAGIC MOUTHWASH) 1:1:1 suspension Swish and spit 10 mL every 4 (four) hours as needed for mouth pain or discomfort   • Ascorbic Acid (vitamin C) 1000 MG tablet Take 1 tablet (1,000 mg total) by mouth every 12 (twelve) hours   • Calcium Carb-Cholecalciferol (Oyster Shell Calcium w/D) 500-200 MG-UNIT TABS No    • calcium carbonate-vitamin D (OSCAL-D) 500 mg-200 units per tablet Take 1 tablet by mouth daily with breakfast   • cholecalciferol (VITAMIN D3) 1,000 units tablet Take 2 tablets (2,000 Units total) by mouth daily   • Diaper Rash Products (Desitin Multi-Purpose Healing) OINT Apply 1 application.  topically 2 (two) times a day   • hydrOXYzine HCL (ATARAX) 10 mg tablet Take 1 tablet (10 mg total) by mouth every 6 (six) hours as needed for itching   • lidocaine (LIDODERM) 5 % Apply 1 patch topically daily Remove & Discard patch within 12 hours or as directed by MD   • Melatonin 500 MCG TBDP Take 1 tablet (500 mcg total) by mouth daily at bedtime as needed (insomnia)   • menthol-cetylpyridinium (CEPACOL) 3 MG lozenge Take 1 lozenge (3 mg total) by mouth as needed for sore throat   • metFORMIN (GLUCOPHAGE) 500 mg tablet Take 1 tablet (500 mg total) by mouth daily with breakfast   • methocarbamol (ROBAXIN) 500 mg tablet Take 1 tablet (500 mg total) by mouth daily at bedtime as needed for muscle spasms   • Multiple Vitamin (multivitamin) tablet Take 1 tablet by mouth daily   • naproxen (NAPROSYN) 500 mg tablet Take 1 tablet (500 mg total) by mouth 2 (two) times a day with meals   • naproxen (Naprosyn) 500 mg tablet Take 1 tablet (500 mg total) by mouth 2 (two) times a day with meals   • triamcinolone (KENALOG) 0.1 % cream Apply topically 2 (two) times a day   • [DISCONTINUED] cetirizine (ZyrTEC) 10 mg tablet Take 1 tablet (10 mg total) by mouth daily   • [DISCONTINUED] fluticasone (FLONASE) 50 mcg/act nasal spray 1 spray into each nostril daily   • [DISCONTINUED] omeprazole (PriLOSEC) 20 mg delayed release capsule Take 1 capsule (20 mg total) by mouth daily       Objective     /80 (BP Location: Right arm, Patient Position: Sitting, Cuff Size: Standard)   Pulse 75   Temp 99 °F (37.2 °C) (Temporal)   Resp 19   Ht 5' 1" (1.549 m)   Wt 71.5 kg (157 lb 9.6 oz)   SpO2 97%   BMI 29.78 kg/m²     Physical Exam  Vitals and nursing note reviewed. Constitutional:       General: She is not in acute distress. Appearance: Normal appearance. She is not ill-appearing. HENT:      Head: Normocephalic and atraumatic. Right Ear: External ear normal.      Left Ear: External ear normal.      Nose: Nose normal.      Mouth/Throat:      Mouth: Mucous membranes are moist.   Eyes:      General:         Right eye: No discharge.          Left eye: No discharge. Pupils: Pupils are equal, round, and reactive to light. Cardiovascular:      Rate and Rhythm: Normal rate and regular rhythm. Pulses: Normal pulses. Heart sounds: Normal heart sounds. Pulmonary:      Effort: Pulmonary effort is normal. No respiratory distress. Breath sounds: Normal breath sounds. No wheezing. Abdominal:      General: Bowel sounds are normal.      Palpations: Abdomen is soft. Tenderness: There is no abdominal tenderness. There is no right CVA tenderness or left CVA tenderness. Musculoskeletal:         General: Normal range of motion. Cervical back: Normal range of motion. Skin:     General: Skin is warm and dry. Neurological:      General: No focal deficit present. Mental Status: She is alert and oriented to person, place, and time.        DIMITRI Mathur

## 2023-08-07 NOTE — ASSESSMENT & PLAN NOTE
Lab Results   Component Value Date    HGBA1C 5.8 (H) 07/30/2023     Repeat A1c in three months prior to next visit

## 2023-08-07 NOTE — ASSESSMENT & PLAN NOTE
Multiple chronic joint pain of blt knee, shoulders and hips ;denies injury worse over the past few months   Will get autoimmune studies, most likely fibromyalgia related.    Pt interested in seeing chiropractor - referral placed   Start diclofenac bid for pain relief   Follow up in three months or prn

## 2023-08-08 ENCOUNTER — LAB (OUTPATIENT)
Dept: LAB | Facility: HOSPITAL | Age: 58
End: 2023-08-08
Payer: COMMERCIAL

## 2023-08-08 DIAGNOSIS — M25.50 CHRONIC PAIN OF MULTIPLE JOINTS: ICD-10-CM

## 2023-08-08 DIAGNOSIS — G89.29 CHRONIC PAIN OF MULTIPLE JOINTS: ICD-10-CM

## 2023-08-08 DIAGNOSIS — R73.03 PREDIABETES: ICD-10-CM

## 2023-08-08 LAB — CRP SERPL QL: 5.3 MG/L

## 2023-08-08 PROCEDURE — 86140 C-REACTIVE PROTEIN: CPT

## 2023-08-08 PROCEDURE — 86200 CCP ANTIBODY: CPT

## 2023-08-08 PROCEDURE — 86430 RHEUMATOID FACTOR TEST QUAL: CPT

## 2023-08-08 PROCEDURE — 36415 COLL VENOUS BLD VENIPUNCTURE: CPT

## 2023-08-08 PROCEDURE — 86038 ANTINUCLEAR ANTIBODIES: CPT

## 2023-08-09 LAB
ANA SER QL IA: NEGATIVE
RHEUMATOID FACT SER QL LA: NEGATIVE

## 2023-08-09 NOTE — RESULT ENCOUNTER NOTE
Autoimmune studies - neg   C-reactive protein mildly elevation indicating inflammation   Continue with NSAID / tylenol for pain as needed.

## 2023-08-11 LAB — CCP AB SER IA-ACNC: 0.8

## 2023-08-29 ENCOUNTER — OFFICE VISIT (OUTPATIENT)
Age: 58
End: 2023-08-29
Payer: COMMERCIAL

## 2023-08-29 VITALS
OXYGEN SATURATION: 98 % | HEIGHT: 61 IN | HEART RATE: 64 BPM | SYSTOLIC BLOOD PRESSURE: 132 MMHG | BODY MASS INDEX: 29.64 KG/M2 | WEIGHT: 157 LBS | DIASTOLIC BLOOD PRESSURE: 74 MMHG

## 2023-08-29 DIAGNOSIS — M99.01 SEGMENTAL DYSFUNCTION OF CERVICAL REGION: ICD-10-CM

## 2023-08-29 DIAGNOSIS — M62.838 MUSCLE SPASM: ICD-10-CM

## 2023-08-29 DIAGNOSIS — G89.29 CHRONIC PAIN OF MULTIPLE JOINTS: ICD-10-CM

## 2023-08-29 DIAGNOSIS — M99.02 SEGMENTAL DYSFUNCTION OF THORACIC REGION: ICD-10-CM

## 2023-08-29 DIAGNOSIS — M25.50 CHRONIC PAIN OF MULTIPLE JOINTS: ICD-10-CM

## 2023-08-29 DIAGNOSIS — M99.03 SEGMENTAL DYSFUNCTION OF LUMBAR REGION: ICD-10-CM

## 2023-08-29 DIAGNOSIS — M51.9 LUMBAR DISC DISORDER: Primary | ICD-10-CM

## 2023-08-29 PROCEDURE — 98941 CHIROPRACT MANJ 3-4 REGIONS: CPT | Performed by: CHIROPRACTOR

## 2023-08-29 PROCEDURE — 99243 OFF/OP CNSLTJ NEW/EST LOW 30: CPT | Performed by: CHIROPRACTOR

## 2023-08-29 NOTE — LETTER
August 31, 2023     Jose Garcia, [address] [address]    Patient: Cristobal Ascencio   YOB: 1965   Date of Visit: 8/29/2023       Dear Dr. Cristian Burks:    Thank you for referring Cristobal Ascencio to me for evaluation. Below are my notes for this consultation. If you have questions, please do not hesitate to call me. I look forward to following your patient along with you. Sincerely,        Eula Hale DC        CC: No Recipients    Eula Hale DC  8/29/2023 10:06 AM  Signed  Initial date of service: 8/29/23    Diagnoses and all orders for this visit:    Lumbar disc disorder    Chronic pain of multiple joints  -     Ambulatory Referral to Chiropractic    Segmental dysfunction of lumbar region    Segmental dysfunction of thoracic region    Segmental dysfunction of cervical region    Muscle spasm      No red flags, radiculopathy or neurologic deficit appreciated clinically. Pt's symptoms and exam findings consistent with mechanical neck pain secondary to repetitive st/sp injury, back pain secondary to disc disorder, exacerbated by postural/ergonomic stressors. Pt responded well to extension stretches, traction, and manual mobilization of the affected spinal and myofascial jt dysfunction, with increased ROM; trial of conservative tx recommended consisting of stretching, ther-ex, graded mobilization/manipulation of the affected spinal/myofascial tissues, postural/ergonomic education, and take home stretches/exercises. If symptoms fail to improve with short trial of conservative care, appropriate imaging and referral will be coordinated. Spent greater than 30 min c pt discussing hx, pe, ddx, tx options and reviewing notes/imaging    TREATMENT: 88031  Fear avoidance behavior discussion, encouraged and reassured pt that natural course of condition is to improve over time with adherence to tx plan and home care strategies.  Home care recommendations: avoid bed rest, walk (but avoid trails and uneven surfaces), gradual return to activity to tolerance (avoid anything that peripheralizes symptoms), ice 20 min on/off, watch for ice burn, call if symptoms peripheralize, worsen, or neurologic deficit progresses. Ther-ex: IASTM - discussed post procedure soreness and/or ecchymosis for up to 36 hrs, applied to affected mm hypertonicities; cobra, sphinx, wall praful, axial retraction, upper trap stretch, SCM stretch, lat rows with t-band, lat pull down with t-band, abdominal bracing; Thoracic mobilization/manipulation: prone P-A mob, supine A-P manip; cervical mobilization/manipulation: traction, diversified supine graded mobilization; lumbar prone extension-traction    HPI:  Casey Thao is a 65 y.o. female   Chief Complaint   Patient presents with   • Back Pain     Middle back pain-8  Lower back pain-8   • Neck Pain     Neck pain-8     Pt presents for eval and tx for chronic intermittent neck and back pain she attributes to herniated disc. Pt has done well with injections in the past    Back Pain  This is a chronic problem. The current episode started more than 1 year ago. The problem occurs constantly. The problem has been waxing and waning since onset. The pain is present in the lumbar spine, thoracic spine and sacro-iliac. The quality of the pain is described as aching. The pain does not radiate. The pain is worse during the day. The symptoms are aggravated by stress, twisting, bending and standing (overhead lifting, laying down; palliative includes walking). Stiffness is present in the morning. Pertinent negatives include no bladder incontinence or bowel incontinence. Risk factors include poor posture and sedentary lifestyle. Neck Pain       History reviewed. No pertinent past medical history.    The following portions of the patient's history were reviewed and updated as appropriate: allergies, past family history, past medical history, past social history, past surgical history, and problem list.  Review of Systems   Gastrointestinal: Negative for bowel incontinence. Genitourinary: Negative for bladder incontinence. Musculoskeletal: Positive for back pain and neck pain. Physical Exam  Eyes:      Extraocular Movements: Extraocular movements intact. Neck:        Comments: Pnful and limited in Brot, Blf   Cardiovascular:      Pulses: Normal pulses. Abdominal:      General: There is no distension. Tenderness: There is no abdominal tenderness. Musculoskeletal:      Cervical back: Pain with movement and muscular tenderness present. Decreased range of motion. Thoracic back: Spasms and tenderness present. Decreased range of motion. Lumbar back: Spasms and tenderness present. Decreased range of motion. Negative right straight leg raise test and negative left straight leg raise test.        Back:       Comments: Pnful and limited in Fl 45 degrees; ext palliative    Lymphadenopathy:      Cervical: No cervical adenopathy. Skin:     General: Skin is warm and dry. Neurological:      Mental Status: She is alert and oriented to person, place, and time. Cranial Nerves: Cranial nerves 2-12 are intact. Sensory: Sensation is intact. Motor: Motor function is intact. Coordination: Coordination is intact. Gait: Gait is intact. Gait and tandem walk normal.      Deep Tendon Reflexes: Reflexes normal. Babinski sign absent on the right side. Babinski sign absent on the left side. Reflex Scores:       Tricep reflexes are 2+ on the right side and 2+ on the left side. Bicep reflexes are 2+ on the right side and 2+ on the left side. Brachioradialis reflexes are 2+ on the right side and 2+ on the left side. Patellar reflexes are 2+ on the right side and 2+ on the left side. Achilles reflexes are 2+ on the right side and 2+ on the left side.   Psychiatric:         Mood and Affect: Mood and affect normal.         Behavior: Behavior normal. SOFT TISSUE ASSESSMENT: Hypertonicity and tenderness palpated B C5-T6, T10-L2 erector spinae, upper traps, SCM, scalene, rhomboid, suboccipitals JOINT RECTRICTIONS: C5-T6, T10-L2 ORTHO: Alaina extension palliative in L/S; max foraminal comp elicits local np R/L; shoulder depression elicits stiffness in R/L upper trap; brachial plexus tension test elicits no neural tension in R/L UE; cervical distraction relieves CC; sitting root and slumpt test elicit local lbp B    No follow-ups on file.

## 2023-08-29 NOTE — PROGRESS NOTES
Initial date of service: 8/29/23    Diagnoses and all orders for this visit:    Lumbar disc disorder    Chronic pain of multiple joints  -     Ambulatory Referral to Chiropractic    Segmental dysfunction of lumbar region    Segmental dysfunction of thoracic region    Segmental dysfunction of cervical region    Muscle spasm      No red flags, radiculopathy or neurologic deficit appreciated clinically. Pt's symptoms and exam findings consistent with mechanical neck pain secondary to repetitive st/sp injury, back pain secondary to disc disorder, exacerbated by postural/ergonomic stressors. Pt responded well to extension stretches, traction, and manual mobilization of the affected spinal and myofascial jt dysfunction, with increased ROM; trial of conservative tx recommended consisting of stretching, ther-ex, graded mobilization/manipulation of the affected spinal/myofascial tissues, postural/ergonomic education, and take home stretches/exercises. If symptoms fail to improve with short trial of conservative care, appropriate imaging and referral will be coordinated. Spent greater than 30 min c pt discussing hx, pe, ddx, tx options and reviewing notes/imaging    TREATMENT: 59615  Fear avoidance behavior discussion, encouraged and reassured pt that natural course of condition is to improve over time with adherence to tx plan and home care strategies. Home care recommendations: avoid bed rest, walk (but avoid trails and uneven surfaces), gradual return to activity to tolerance (avoid anything that peripheralizes symptoms), ice 20 min on/off, watch for ice burn, call if symptoms peripheralize, worsen, or neurologic deficit progresses. Ther-ex: IASTM - discussed post procedure soreness and/or ecchymosis for up to 36 hrs, applied to affected mm hypertonicities; cobra, sphinx, wall praful, axial retraction, upper trap stretch, SCM stretch, lat rows with t-band, lat pull down with t-band, abdominal bracing;  Thoracic mobilization/manipulation: prone P-A mob, supine A-P manip; cervical mobilization/manipulation: traction, diversified supine graded mobilization; lumbar prone extension-traction    HPI:  Alexx Flores is a 65 y.o. female   Chief Complaint   Patient presents with   • Back Pain     Middle back pain-8  Lower back pain-8   • Neck Pain     Neck pain-8     Pt presents for eval and tx for chronic intermittent neck and back pain she attributes to herniated disc. Pt has done well with injections in the past    Back Pain  This is a chronic problem. The current episode started more than 1 year ago. The problem occurs constantly. The problem has been waxing and waning since onset. The pain is present in the lumbar spine, thoracic spine and sacro-iliac. The quality of the pain is described as aching. The pain does not radiate. The pain is worse during the day. The symptoms are aggravated by stress, twisting, bending and standing (overhead lifting, laying down; palliative includes walking). Stiffness is present in the morning. Pertinent negatives include no bladder incontinence or bowel incontinence. Risk factors include poor posture and sedentary lifestyle. Neck Pain       History reviewed. No pertinent past medical history. The following portions of the patient's history were reviewed and updated as appropriate: allergies, past family history, past medical history, past social history, past surgical history, and problem list.  Review of Systems   Gastrointestinal: Negative for bowel incontinence. Genitourinary: Negative for bladder incontinence. Musculoskeletal: Positive for back pain and neck pain. Physical Exam  Eyes:      Extraocular Movements: Extraocular movements intact. Neck:        Comments: Pnful and limited in Brot, Blf   Cardiovascular:      Pulses: Normal pulses. Abdominal:      General: There is no distension. Tenderness: There is no abdominal tenderness.    Musculoskeletal:      Cervical back: Pain with movement and muscular tenderness present. Decreased range of motion. Thoracic back: Spasms and tenderness present. Decreased range of motion. Lumbar back: Spasms and tenderness present. Decreased range of motion. Negative right straight leg raise test and negative left straight leg raise test.        Back:       Comments: Pnful and limited in Fl 45 degrees; ext palliative    Lymphadenopathy:      Cervical: No cervical adenopathy. Skin:     General: Skin is warm and dry. Neurological:      Mental Status: She is alert and oriented to person, place, and time. Cranial Nerves: Cranial nerves 2-12 are intact. Sensory: Sensation is intact. Motor: Motor function is intact. Coordination: Coordination is intact. Gait: Gait is intact. Gait and tandem walk normal.      Deep Tendon Reflexes: Reflexes normal. Babinski sign absent on the right side. Babinski sign absent on the left side. Reflex Scores:       Tricep reflexes are 2+ on the right side and 2+ on the left side. Bicep reflexes are 2+ on the right side and 2+ on the left side. Brachioradialis reflexes are 2+ on the right side and 2+ on the left side. Patellar reflexes are 2+ on the right side and 2+ on the left side. Achilles reflexes are 2+ on the right side and 2+ on the left side. Psychiatric:         Mood and Affect: Mood and affect normal.         Behavior: Behavior normal.       SOFT TISSUE ASSESSMENT: Hypertonicity and tenderness palpated B C5-T6, T10-L2 erector spinae, upper traps, SCM, scalene, rhomboid, suboccipitals JOINT RECTRICTIONS: C5-T6, T10-L2 ORTHO: Alaina extension palliative in L/S; max foraminal comp elicits local np R/L; shoulder depression elicits stiffness in R/L upper trap; brachial plexus tension test elicits no neural tension in R/L UE; cervical distraction relieves CC; sitting root and slumpt test elicit local lbp B    No follow-ups on file.

## 2023-08-30 ENCOUNTER — TELEPHONE (OUTPATIENT)
Dept: FAMILY MEDICINE CLINIC | Facility: CLINIC | Age: 58
End: 2023-08-30

## 2023-08-30 ENCOUNTER — HOSPITAL ENCOUNTER (OUTPATIENT)
Dept: MAMMOGRAPHY | Facility: CLINIC | Age: 58
Discharge: HOME | End: 2023-08-30
Payer: COMMERCIAL

## 2023-08-30 VITALS — WEIGHT: 157 LBS | BODY MASS INDEX: 29.64 KG/M2 | HEIGHT: 61 IN

## 2023-08-30 DIAGNOSIS — Z12.31 ENCOUNTER FOR SCREENING MAMMOGRAM FOR BREAST CANCER: ICD-10-CM

## 2023-08-30 PROCEDURE — 77063 BREAST TOMOSYNTHESIS BI: CPT

## 2023-08-30 PROCEDURE — 77067 SCR MAMMO BI INCL CAD: CPT

## 2023-08-30 NOTE — TELEPHONE ENCOUNTER
[facility]HA form received on 8/30/23  to be completed by PCP. Copy made and placed in PCP folder. Forms to be delivered to PCP mailbox at assigned time.     PATIENT NEEDS AS SOON AS POSSIBLE IF NOT SHE COULD GET FIRED

## 2023-08-30 NOTE — TELEPHONE ENCOUNTER
Unfortunately the patient has not had a physical, she has onoly had sick visits and this form requires a physical, along with TB testing and vaccine records, or titers which we do not have so she will need to be scheduled for a physical with any available provider

## 2023-09-05 ENCOUNTER — PROCEDURE VISIT (OUTPATIENT)
Age: 58
End: 2023-09-05
Payer: COMMERCIAL

## 2023-09-05 VITALS
HEART RATE: 74 BPM | WEIGHT: 157 LBS | SYSTOLIC BLOOD PRESSURE: 122 MMHG | DIASTOLIC BLOOD PRESSURE: 62 MMHG | BODY MASS INDEX: 29.64 KG/M2 | HEIGHT: 61 IN | OXYGEN SATURATION: 97 %

## 2023-09-05 DIAGNOSIS — G89.29 CHRONIC PAIN OF MULTIPLE JOINTS: ICD-10-CM

## 2023-09-05 DIAGNOSIS — M51.9 LUMBAR DISC DISORDER: Primary | ICD-10-CM

## 2023-09-05 DIAGNOSIS — M62.838 MUSCLE SPASM: ICD-10-CM

## 2023-09-05 DIAGNOSIS — M99.01 SEGMENTAL DYSFUNCTION OF CERVICAL REGION: ICD-10-CM

## 2023-09-05 DIAGNOSIS — M99.02 SEGMENTAL DYSFUNCTION OF THORACIC REGION: ICD-10-CM

## 2023-09-05 DIAGNOSIS — M25.50 CHRONIC PAIN OF MULTIPLE JOINTS: ICD-10-CM

## 2023-09-05 DIAGNOSIS — M99.03 SEGMENTAL DYSFUNCTION OF LUMBAR REGION: ICD-10-CM

## 2023-09-05 PROCEDURE — 98941 CHIROPRACT MANJ 3-4 REGIONS: CPT | Performed by: CHIROPRACTOR

## 2023-09-05 NOTE — PROGRESS NOTES
Initial date of service: 8/29/23    Diagnoses and all orders for this visit:    Lumbar disc disorder    Segmental dysfunction of lumbar region    Segmental dysfunction of thoracic region    Segmental dysfunction of cervical region    Muscle spasm    Chronic pain of multiple joints      Pt improved with reduced pain and increased ROM    TREATMENT: [id number]  Ther-ex: IASTM - discussed post procedure soreness and/or ecchymosis for up to 36 hrs, applied to affected mm hypertonicities; cobra, sphinx, wall praful, axial retraction, upper trap stretch, SCM stretch, lat rows with t-band, lat pull down with t-band, abdominal bracing; Thoracic mobilization/manipulation: prone P-A mob, supine A-P manip; cervical mobilization/manipulation: traction, diversified supine graded mobilization; lumbar prone extension-traction    HPI:  Vladimir Echols is a 65 y.o. female   Chief Complaint   Patient presents with   • Back Pain     Middle back pain-4  Lower back pain-4   • Neck Pain     Neck pain-4     Pt presents for tx for chronic intermittent neck and back pain she attributes to herniated disc. Pt has done well with injections in the past  9/5: Pt reports feeling slightly better    Back Pain  This is a chronic problem. The current episode started more than 1 year ago. The problem occurs constantly. The problem has been waxing and waning since onset. The pain is present in the lumbar spine, thoracic spine and sacro-iliac. The quality of the pain is described as aching. The pain does not radiate. Pain scale: 3-6/10. The pain is worse during the day. The symptoms are aggravated by stress, twisting, bending and standing (overhead lifting, laying down; palliative includes walking). Stiffness is present in the morning. Risk factors include poor posture and sedentary lifestyle. Neck Pain       History reviewed. No pertinent past medical history.    The following portions of the patient's history were reviewed and updated as appropriate: allergies, past family history, past medical history, past social history, past surgical history, and problem list.  Review of Systems   Musculoskeletal: Positive for back pain and neck pain. Physical Exam  Neck:        Comments: Pnful and limited in Brot, Blf   Musculoskeletal:      Cervical back: Pain with movement and muscular tenderness present. Decreased range of motion. Thoracic back: Spasms and tenderness present. Decreased range of motion. Lumbar back: Spasms and tenderness present. Decreased range of motion. Negative right straight leg raise test and negative left straight leg raise test.        Back:       Comments: Pnful and limited in Fl 45 degrees; ext palliative    Skin:     General: Skin is warm and dry. Neurological:      Mental Status: She is alert and oriented to person, place, and time. Gait: Gait is intact. Psychiatric:         Mood and Affect: Mood and affect normal.         Behavior: Behavior normal.       SOFT TISSUE ASSESSMENT: Hypertonicity and tenderness palpated B C5-T6, T10-L2 erector spinae, upper traps, SCM, scalene, rhomboid, suboccipitals JOINT RECTRICTIONS: C5-T6, T10-L2     Return in about 1 week (around 9/12/2023) for Next scheduled follow up.

## 2023-09-06 ENCOUNTER — APPOINTMENT (OUTPATIENT)
Dept: LAB | Facility: HOSPITAL | Age: 58
End: 2023-09-06
Payer: COMMERCIAL

## 2023-09-06 ENCOUNTER — OFFICE VISIT (OUTPATIENT)
Dept: FAMILY MEDICINE CLINIC | Facility: CLINIC | Age: 58
End: 2023-09-06

## 2023-09-06 VITALS
BODY MASS INDEX: 30.21 KG/M2 | HEART RATE: 83 BPM | OXYGEN SATURATION: 98 % | RESPIRATION RATE: 16 BRPM | HEIGHT: 61 IN | TEMPERATURE: 98 F | SYSTOLIC BLOOD PRESSURE: 124 MMHG | WEIGHT: 160 LBS | DIASTOLIC BLOOD PRESSURE: 80 MMHG

## 2023-09-06 DIAGNOSIS — E55.9 VITAMIN D DEFICIENCY: ICD-10-CM

## 2023-09-06 DIAGNOSIS — Z11.1 SCREENING-PULMONARY TB: ICD-10-CM

## 2023-09-06 DIAGNOSIS — E66.9 CLASS 1 OBESITY WITHOUT SERIOUS COMORBIDITY WITH BODY MASS INDEX (BMI) OF 31.0 TO 31.9 IN ADULT, UNSPECIFIED OBESITY TYPE: ICD-10-CM

## 2023-09-06 DIAGNOSIS — Z02.89 EXAMINATION, PHYSICAL, EMPLOYEE: Primary | ICD-10-CM

## 2023-09-06 DIAGNOSIS — Z01.84 IMMUNITY STATUS TESTING: ICD-10-CM

## 2023-09-06 DIAGNOSIS — J32.9 CHRONIC SINUSITIS, UNSPECIFIED LOCATION: ICD-10-CM

## 2023-09-06 DIAGNOSIS — G47.00 INSOMNIA, UNSPECIFIED TYPE: ICD-10-CM

## 2023-09-06 DIAGNOSIS — K21.9 GASTROESOPHAGEAL REFLUX DISEASE WITHOUT ESOPHAGITIS: ICD-10-CM

## 2023-09-06 DIAGNOSIS — R73.03 PREDIABETES: ICD-10-CM

## 2023-09-06 DIAGNOSIS — R73.03 PRE-DIABETES: ICD-10-CM

## 2023-09-06 PROBLEM — M54.50 ACUTE RIGHT-SIDED LOW BACK PAIN WITHOUT SCIATICA: Status: RESOLVED | Noted: 2022-05-13 | Resolved: 2023-09-06

## 2023-09-06 PROBLEM — L24.89 IRRITANT CONTACT DERMATITIS DUE TO OTHER AGENTS: Status: RESOLVED | Noted: 2023-05-22 | Resolved: 2023-09-06

## 2023-09-06 PROBLEM — M54.2 NECK PAIN: Status: RESOLVED | Noted: 2021-08-02 | Resolved: 2023-09-06

## 2023-09-06 PROBLEM — M25.511 CHRONIC PAIN OF BOTH SHOULDERS: Status: RESOLVED | Noted: 2021-11-04 | Resolved: 2023-09-06

## 2023-09-06 PROBLEM — R74.01 TRANSAMINITIS: Status: RESOLVED | Noted: 2021-11-04 | Resolved: 2023-09-06

## 2023-09-06 PROBLEM — Z12.31 ENCOUNTER FOR SCREENING MAMMOGRAM FOR BREAST CANCER: Status: RESOLVED | Noted: 2023-08-07 | Resolved: 2023-09-06

## 2023-09-06 PROBLEM — E04.1 THYROID NODULE: Status: RESOLVED | Noted: 2021-08-02 | Resolved: 2023-09-06

## 2023-09-06 PROBLEM — R06.83 SNORING: Status: RESOLVED | Noted: 2021-11-04 | Resolved: 2023-09-06

## 2023-09-06 PROBLEM — Z92.29 HISTORY OF BCG VACCINATION: Status: RESOLVED | Noted: 2020-07-06 | Resolved: 2023-09-06

## 2023-09-06 PROBLEM — G89.29 CHRONIC LOW BACK PAIN: Status: RESOLVED | Noted: 2021-08-02 | Resolved: 2023-09-06

## 2023-09-06 PROBLEM — G89.29 CHRONIC PAIN OF BOTH SHOULDERS: Status: RESOLVED | Noted: 2021-11-04 | Resolved: 2023-09-06

## 2023-09-06 PROBLEM — M54.50 CHRONIC LOW BACK PAIN: Status: RESOLVED | Noted: 2021-08-02 | Resolved: 2023-09-06

## 2023-09-06 PROBLEM — M25.512 CHRONIC PAIN OF BOTH SHOULDERS: Status: RESOLVED | Noted: 2021-11-04 | Resolved: 2023-09-06

## 2023-09-06 LAB
25(OH)D3 SERPL-MCNC: 24.9 NG/ML (ref 30–100)
HBV CORE AB SER QL: NORMAL
HBV CORE IGM SER QL: NORMAL
HBV SURFACE AB SER-ACNC: 14.9 MIU/ML
HBV SURFACE AG SER QL: NORMAL
VZV IGG SER QL IA: NORMAL

## 2023-09-06 PROCEDURE — 86787 VARICELLA-ZOSTER ANTIBODY: CPT

## 2023-09-06 PROCEDURE — 86706 HEP B SURFACE ANTIBODY: CPT

## 2023-09-06 PROCEDURE — 87350 HEPATITIS BE AG IA: CPT

## 2023-09-06 PROCEDURE — 86735 MUMPS ANTIBODY: CPT

## 2023-09-06 PROCEDURE — 99215 OFFICE O/P EST HI 40 MIN: CPT

## 2023-09-06 PROCEDURE — 86765 RUBEOLA ANTIBODY: CPT

## 2023-09-06 PROCEDURE — 86707 HEPATITIS BE ANTIBODY: CPT

## 2023-09-06 PROCEDURE — 86705 HEP B CORE ANTIBODY IGM: CPT

## 2023-09-06 PROCEDURE — 87340 HEPATITIS B SURFACE AG IA: CPT

## 2023-09-06 PROCEDURE — 86704 HEP B CORE ANTIBODY TOTAL: CPT

## 2023-09-06 PROCEDURE — 36415 COLL VENOUS BLD VENIPUNCTURE: CPT

## 2023-09-06 PROCEDURE — 86480 TB TEST CELL IMMUN MEASURE: CPT

## 2023-09-06 PROCEDURE — 86762 RUBELLA ANTIBODY: CPT

## 2023-09-06 PROCEDURE — 82306 VITAMIN D 25 HYDROXY: CPT

## 2023-09-06 NOTE — ASSESSMENT & PLAN NOTE
- CT sinus ordered by previous PCP not approved due to patient not trying 12 weeks of an intranasal corticosteroid spray. - She has been using flonse intermittently which does helps. She tilts her head back and feels medications dripping down her throat. - Reviewed proper nasal spray administration technique. Reviewed importance of daily adherence. Advised to increase frequency to BID if no improvement. - Continue zrytec as well.

## 2023-09-06 NOTE — PROGRESS NOTES
[address] [facility], [place] [facility]    NAME: Kentrell Chung  AGE: 65 y.o. SEX: female  : 1965     DATE: 2023     Assessment and Plan:     Problem List Items Addressed This Visit        Digestive    Gastroesophageal reflux disease without esophagitis     - Stable. Continue omeprazole 20 mg daily. Nonpharmacologic treatments include eating smaller meals, elevation of the head of bed at night, avoidance of caffeine, chocolate, nicotine and peppermint, and avoiding tight fitting clothing. Respiratory    Chronic sinusitis     - CT sinus ordered by previous PCP not approved due to patient not trying 12 weeks of an intranasal corticosteroid spray. - She has been using flonse intermittently which does helps. She tilts her head back and feels medications dripping down her throat. - Reviewed proper nasal spray administration technique. Reviewed importance of daily adherence. Advised to increase frequency to BID if no improvement. - Continue zrytec as well. Other    Insomnia     -stable  -Continue melatonin 500 mcg HS         Class 1 obesity without serious comorbidity with body mass index (BMI) of 31.0 to 31.9 in adult     Body mass index is 30.23 kg/m². The BMI is above normal. Nutrition recommendations include reducing portion sizes, decreasing overall calorie intake, 3-5 servings of fruits/vegetables daily, reducing fast food intake, consuming healthier snacks, decreasing soda and/or juice intake, moderation in carbohydrate intake, increasing intake of lean protein, reducing intake of saturated fat and trans fat and reducing intake of cholesterol. Exercise recommendations include moderate aerobic physical activity for 150 minutes/week. Examination, physical, employee - Primary     - Will complete form once TB & immunity testing has resulted.           Prediabetes     Lab Results   Component Value Date HGBA1C 5.8 (H) 07/30/2023     - Discussed pros and cons of metformin use in prediabetes. - Opted to discontinue metformin at this time and implement lifestyle management. - Check a1c in July of 2024. Other Visit Diagnoses     Immunity status testing        Relevant Orders    Varicella Zoster Virus Ab (Immunity Scr),ACIF (S)    Measles/Mumps/Rubella Immunity    Hepatitis B Immunity Panel    Screening-pulmonary TB        Relevant Orders    Quantiferon TB Gold Plus    Vitamin D deficiency        Relevant Orders    Vitamin D 25 hydroxy          Immunizations and preventive care screenings were discussed with patient today. Appropriate education was printed on patient's after visit summary. Counseling:  Dental Health: discussed importance of regular tooth brushing, flossing, and dental visits. Injury prevention: discussed safety/seat belts, safety helmets, smoke detectors, carbon dioxide detectors, and smoking near bedding or upholstery. · Exercise: the importance of regular exercise/physical activity was discussed. Recommend exercise 3-5 times per week for at least 30 minutes. Return in about 3 months (around 12/6/2023) for Recheck sinusitis. Chief Complaint:     Chief Complaint   Patient presents with   • Physical Exam      History of Present Illness:     Adult Annual Physical   Patient here for a comprehensive physical exam. The patient reports no problems. Diet and Physical Activity  · Diet/Nutrition: well balanced diet. · Exercise: walking. Depression Screening  PHQ-2/9 Depression Screening         General Health  · Sleep: gets 7-8 hours of sleep on average. · Hearing: normal - bilateral.  · Vision: wears glasses. · Dental: regular dental visits and brushes teeth twice daily. /GYN Health  · Patient is: postmenopausal  · Last menstrual period: 2015     Review of Systems:     Review of Systems   Constitutional: Negative for chills and fever.    HENT: Negative for ear pain and sore throat. Eyes: Negative for pain and visual disturbance. Respiratory: Negative for cough and shortness of breath. Cardiovascular: Negative for chest pain and palpitations. Gastrointestinal: Negative for abdominal pain and vomiting. Endocrine: Negative. Genitourinary: Negative for dysuria and hematuria. Musculoskeletal: Positive for arthralgias, back pain and myalgias. Skin: Negative for color change and rash. Neurological: Negative for seizures and syncope. Hematological: Negative. Psychiatric/Behavioral: Negative. All other systems reviewed and are negative. Past Medical History:     History reviewed. No pertinent past medical history.    Past Surgical History:     Past Surgical History:   Procedure Laterality Date   • BILATERAL OOPHORECTOMY Bilateral    • BREAST BIOPSY Left 2019   •  SECTION      x2   • HYSTERECTOMY Right       Social History:     Social History     Socioeconomic History   • Marital status: Single     Spouse name: None   • Number of children: None   • Years of education: None   • Highest education level: None   Occupational History   • None   Tobacco Use   • Smoking status: Never     Passive exposure: Never   • Smokeless tobacco: Never   Vaping Use   • Vaping Use: Never used   Substance and Sexual Activity   • Alcohol use: Not Currently   • Drug use: Never   • Sexual activity: Not Currently     Comment: not    Other Topics Concern   • None   Social History Narrative   • None     Social Determinants of Health     Financial Resource Strain: Low Risk  (10/4/2022)    Overall Financial Resource Strain (CARDIA)    • Difficulty of Paying Living Expenses: Not hard at all   Food Insecurity: No Food Insecurity (10/4/2022)    Hunger Vital Sign    • Worried About Running Out of Food in the Last Year: Never true    • Ran Out of Food in the Last Year: Never true   Transportation Needs: No Transportation Needs (2023)    PRAPARE - Transportation • Lack of Transportation (Medical): No    • Lack of Transportation (Non-Medical): No   Physical Activity: Not on file   Stress: Not on file   Social Connections: Not on file   Intimate Partner Violence: Not on file   Housing Stability: Not on file      Family History:     Family History   Problem Relation Age of Onset   • No Known Problems Mother    • Liver cancer Father    • Pancreatic cancer Father    • No Known Problems Sister    • No Known Problems Sister    • No Known Problems Sister    • No Known Problems Sister    • No Known Problems Daughter    • No Known Problems Daughter    • Vaginal cancer Maternal Grandmother    • Uterine cancer Maternal Grandmother    • Breast cancer Paternal Aunt 48      Current Medications:     Current Outpatient Medications   Medication Sig Dispense Refill   • acetaminophen (TYLENOL) 500 mg tablet Take 1 tablet (500 mg total) by mouth every 6 (six) hours as needed for moderate pain 30 tablet 0   • al mag oxide-diphenhydramine-lidocaine viscous (MAGIC MOUTHWASH) 1:1:1 suspension Swish and spit 10 mL every 4 (four) hours as needed for mouth pain or discomfort 90 mL 0   • brompheniramine-pseudoephedrine-DM 30-2-10 MG/5ML syrup Take 5 mL by mouth 4 (four) times a day as needed for congestion or allergies 120 mL 0   • Calcium Carb-Cholecalciferol (Oyster Shell Calcium w/D) 500-200 MG-UNIT TABS No      • calcium carbonate-vitamin D (OSCAL-D) 500 mg-200 units per tablet Take 1 tablet by mouth daily with breakfast 30 tablet 2   • cetirizine (ZyrTEC) 10 mg tablet Take 1 tablet (10 mg total) by mouth daily 30 tablet 1   • Diaper Rash Products (Desitin Multi-Purpose Healing) OINT Apply 1 application.  topically 2 (two) times a day 397 g 0   • diclofenac sodium (VOLTAREN) 50 mg EC tablet Take 1 tablet (50 mg total) by mouth 2 (two) times a day 120 tablet 0   • fluticasone (FLONASE) 50 mcg/act nasal spray 1 spray into each nostril daily 11.1 mL 1   • hydrOXYzine HCL (ATARAX) 10 mg tablet Take 1 tablet (10 mg total) by mouth every 6 (six) hours as needed for itching 90 tablet 1   • lidocaine (LIDODERM) 5 % Apply 1 patch topically daily Remove & Discard patch within 12 hours or as directed by MD 30 patch 0   • Melatonin 500 MCG TBDP Take 1 tablet (500 mcg total) by mouth daily at bedtime as needed (insomnia)     • menthol-cetylpyridinium (CEPACOL) 3 MG lozenge Take 1 lozenge (3 mg total) by mouth as needed for sore throat 9 lozenge 0   • methocarbamol (ROBAXIN) 500 mg tablet Take 1 tablet (500 mg total) by mouth daily at bedtime as needed for muscle spasms 30 tablet 0   • naproxen (NAPROSYN) 500 mg tablet Take 1 tablet (500 mg total) by mouth 2 (two) times a day with meals 60 tablet 0   • naproxen (Naprosyn) 500 mg tablet Take 1 tablet (500 mg total) by mouth 2 (two) times a day with meals 30 tablet 0   • omeprazole (PriLOSEC) 20 mg delayed release capsule Take 1 capsule (20 mg total) by mouth daily 90 capsule 1   • triamcinolone (KENALOG) 0.1 % cream Apply topically 2 (two) times a day 30 g 0     No current facility-administered medications for this visit. Allergies:     No Known Allergies   Physical Exam:     /80 (BP Location: Right arm, Patient Position: Sitting, Cuff Size: Standard)   Pulse 83   Temp 98 °F (36.7 °C) (Temporal)   Resp 16   Ht 5' 1" (1.549 m)   Wt 72.6 kg (160 lb)   SpO2 98%   BMI 30.23 kg/m²     Physical Exam  Vitals and nursing note reviewed. Constitutional:       General: She is not in acute distress. Appearance: She is well-developed. She is obese. HENT:      Head: Normocephalic and atraumatic. Right Ear: Tympanic membrane, ear canal and external ear normal.      Left Ear: Tympanic membrane, ear canal and external ear normal.      Nose: Nose normal.      Mouth/Throat:      Mouth: Mucous membranes are moist.   Eyes:      Extraocular Movements: Extraocular movements intact.       Conjunctiva/sclera: Conjunctivae normal.   Cardiovascular:      Rate and Rhythm: Normal rate and regular rhythm. Pulses: Normal pulses. Heart sounds: Normal heart sounds. No murmur heard. Pulmonary:      Effort: Pulmonary effort is normal. No respiratory distress. Breath sounds: Normal breath sounds. Abdominal:      General: Abdomen is flat. Bowel sounds are normal.      Palpations: Abdomen is soft. Tenderness: There is no abdominal tenderness. Musculoskeletal:         General: No swelling. Normal range of motion. Cervical back: Normal range of motion and neck supple. Skin:     General: Skin is warm and dry. Capillary Refill: Capillary refill takes less than 2 seconds. Neurological:      General: No focal deficit present. Mental Status: She is alert and oriented to person, place, and time. Mental status is at baseline. Psychiatric:         Mood and Affect: Mood normal.         Behavior: Behavior normal.         Thought Content:  Thought content normal.         Judgment: Judgment normal.          Abena Villela, [address] [facility]

## 2023-09-06 NOTE — ASSESSMENT & PLAN NOTE
Lab Results   Component Value Date    HGBA1C 5.8 (H) 07/30/2023     - Discussed pros and cons of metformin use in prediabetes. - Opted to discontinue metformin at this time and implement lifestyle management. - Check a1c in July of 2024.

## 2023-09-06 NOTE — ASSESSMENT & PLAN NOTE
Body mass index is 30.23 kg/m². The BMI is above normal. Nutrition recommendations include reducing portion sizes, decreasing overall calorie intake, 3-5 servings of fruits/vegetables daily, reducing fast food intake, consuming healthier snacks, decreasing soda and/or juice intake, moderation in carbohydrate intake, increasing intake of lean protein, reducing intake of saturated fat and trans fat and reducing intake of cholesterol. Exercise recommendations include moderate aerobic physical activity for 150 minutes/week.

## 2023-09-06 NOTE — ASSESSMENT & PLAN NOTE
- Stable. Continue omeprazole 20 mg daily. Nonpharmacologic treatments include eating smaller meals, elevation of the head of bed at night, avoidance of caffeine, chocolate, nicotine and peppermint, and avoiding tight fitting clothing.

## 2023-09-07 LAB
HBV E AB SERPL QL IA: NEGATIVE
HBV E AG SERPL QL IA: NEGATIVE
MEV IGG SER IA-ACNC: >300 AU/ML
MUV IGG SER IA-ACNC: >300 AU/ML
RUBV IGG SERPL IA-ACNC: 12.9 INDEX

## 2023-09-08 LAB
GAMMA INTERFERON BACKGROUND BLD IA-ACNC: 0.19 IU/ML
M TB IFN-G BLD-IMP: POSITIVE
M TB IFN-G CD4+ BCKGRND COR BLD-ACNC: 6.4 IU/ML
M TB IFN-G CD4+ BCKGRND COR BLD-ACNC: 6.4 IU/ML
MITOGEN IGNF BCKGRD COR BLD-ACNC: >10 IU/ML

## 2023-09-11 DIAGNOSIS — R76.12 POSITIVE QUANTIFERON-TB GOLD TEST: Primary | ICD-10-CM

## 2023-11-05 PROBLEM — Z02.89 EXAMINATION, PHYSICAL, EMPLOYEE: Status: RESOLVED | Noted: 2023-08-07 | Resolved: 2023-11-05

## 2023-11-30 ENCOUNTER — TELEPHONE (OUTPATIENT)
Dept: FAMILY MEDICINE CLINIC | Facility: CLINIC | Age: 58
End: 2023-11-30

## 2023-11-30 NOTE — TELEPHONE ENCOUNTER
first attempt to contact patient. left message to return my call on answering machine. NEEDS TO RESCHEDULE APPOINTMENT.

## 2024-01-18 NOTE — ASSESSMENT & PLAN NOTE
-reported hx of thyroid nodule in the past, prior workup in DR and treated with medication at that time  -most recent TSH on 7/2020 normal  -physical examination no palpable nodules appreciated  -thyroid U/S ordered  -TSH labs for follow up 2

## 2024-01-23 ENCOUNTER — OFFICE VISIT (OUTPATIENT)
Dept: FAMILY MEDICINE CLINIC | Facility: CLINIC | Age: 59
End: 2024-01-23

## 2024-01-23 VITALS
HEART RATE: 70 BPM | BODY MASS INDEX: 30.02 KG/M2 | TEMPERATURE: 98.7 F | DIASTOLIC BLOOD PRESSURE: 80 MMHG | SYSTOLIC BLOOD PRESSURE: 120 MMHG | OXYGEN SATURATION: 99 % | RESPIRATION RATE: 16 BRPM | HEIGHT: 61 IN | WEIGHT: 159 LBS

## 2024-01-23 DIAGNOSIS — Z12.4 SCREENING FOR CERVICAL CANCER: ICD-10-CM

## 2024-01-23 DIAGNOSIS — F41.9 ANXIETY: ICD-10-CM

## 2024-01-23 DIAGNOSIS — M54.50 CHRONIC LOW BACK PAIN, UNSPECIFIED BACK PAIN LATERALITY, UNSPECIFIED WHETHER SCIATICA PRESENT: ICD-10-CM

## 2024-01-23 DIAGNOSIS — G89.29 CHRONIC LOW BACK PAIN, UNSPECIFIED BACK PAIN LATERALITY, UNSPECIFIED WHETHER SCIATICA PRESENT: ICD-10-CM

## 2024-01-23 DIAGNOSIS — Z00.00 ANNUAL PHYSICAL EXAM: Primary | ICD-10-CM

## 2024-01-23 DIAGNOSIS — J32.9 CHRONIC SINUSITIS, UNSPECIFIED LOCATION: ICD-10-CM

## 2024-01-23 DIAGNOSIS — Z23 ENCOUNTER FOR IMMUNIZATION: ICD-10-CM

## 2024-01-23 DIAGNOSIS — M54.2 NECK PAIN: ICD-10-CM

## 2024-01-23 PROBLEM — H93.8X2 SENSATION OF FULLNESS IN LEFT EAR: Status: RESOLVED | Noted: 2021-11-04 | Resolved: 2024-01-23

## 2024-01-23 PROCEDURE — 90750 HZV VACC RECOMBINANT IM: CPT

## 2024-01-23 PROCEDURE — 90715 TDAP VACCINE 7 YRS/> IM: CPT

## 2024-01-23 PROCEDURE — 99213 OFFICE O/P EST LOW 20 MIN: CPT

## 2024-01-23 PROCEDURE — 90472 IMMUNIZATION ADMIN EACH ADD: CPT

## 2024-01-23 PROCEDURE — 90471 IMMUNIZATION ADMIN: CPT

## 2024-01-23 PROCEDURE — 99396 PREV VISIT EST AGE 40-64: CPT

## 2024-01-23 RX ORDER — FLUTICASONE PROPIONATE 50 MCG
1 SPRAY, SUSPENSION (ML) NASAL 2 TIMES DAILY
Qty: 11.1 ML | Refills: 1 | Status: SHIPPED | OUTPATIENT
Start: 2024-01-23

## 2024-01-23 RX ORDER — ZOSTER VACCINE RECOMBINANT, ADJUVANTED 50 MCG/0.5
0.5 KIT INTRAMUSCULAR ONCE
Qty: 1 EACH | Refills: 1 | Status: SHIPPED | OUTPATIENT
Start: 2024-01-23 | End: 2024-01-23

## 2024-01-23 RX ORDER — HYDROXYZINE HYDROCHLORIDE 10 MG/1
10 TABLET, FILM COATED ORAL EVERY 6 HOURS PRN
Qty: 90 TABLET | Refills: 1 | Status: SHIPPED | OUTPATIENT
Start: 2024-01-23

## 2024-01-23 RX ORDER — LIDOCAINE 50 MG/G
1 PATCH TOPICAL DAILY
Qty: 30 PATCH | Refills: 12 | Status: SHIPPED | OUTPATIENT
Start: 2024-01-23

## 2024-01-23 RX ORDER — CETIRIZINE HYDROCHLORIDE 10 MG/1
10 TABLET ORAL DAILY
Qty: 30 TABLET | Refills: 1 | Status: SHIPPED | OUTPATIENT
Start: 2024-01-23

## 2024-01-23 NOTE — PROGRESS NOTES
ADULT ANNUAL PHYSICAL  [facility]    NAME: Helga Berg  AGE: 58 y.o. SEX: female  : 1965     DATE: 2024     Assessment and Plan:     Problem List Items Addressed This Visit       Anxiety    Relevant Medications    hydrOXYzine HCL (ATARAX) 10 mg tablet    Chronic sinusitis     Increase flonase to BID, advised to purchase zyrtec otc. CT sinus ordered         Relevant Medications    cetirizine (ZyrTEC) 10 mg tablet    fluticasone (FLONASE) 50 mcg/act nasal spray    Other Relevant Orders    CT sinus wo contrast     Other Visit Diagnoses       Annual physical exam    -  Primary    Encounter for immunization        Relevant Medications    Zoster Vac Recomb Adjuvanted (Shingrix) 50 MCG/0.5ML SUSR    Other Relevant Orders    TDAP VACCINE GREATER THAN OR EQUAL TO 8YO IM (Completed)    Zoster Vaccine Recombinant IM (Completed)    Screening for cervical cancer        Chronic low back pain, unspecified back pain laterality, unspecified whether sciatica present        Relevant Medications    lidocaine (LIDODERM) 5 %    Neck pain        Relevant Medications    lidocaine (LIDODERM) 5 %            Immunizations and preventive care screenings were discussed with patient today. Appropriate education was printed on patient's after visit summary.    Counseling:  Alcohol/drug use: discussed moderation in alcohol intake, the recommendations for healthy alcohol use, and avoidance of illicit drug use.  Dental Health: discussed importance of regular tooth brushing, flossing, and dental visits.  Exercise: the importance of regular exercise/physical activity was discussed. Recommend exercise 3-5 times per week for at least 30 minutes.     BMI Counseling: Body mass index is 30.04 kg/m². The BMI is above normal. Nutrition recommendations include decreasing portion sizes, encouraging healthy choices of fruits and vegetables, decreasing fast food intake,  consuming healthier snacks, limiting drinks that contain sugar, moderation in carbohydrate intake, increasing intake of lean protein, reducing intake of saturated and trans fat and reducing intake of cholesterol. Exercise recommendations include moderate physical activity 150 minutes/week. No pharmacotherapy was ordered. Rationale for BMI follow-up plan is due to patient being overweight or obese.         Return in about 3 months (around 4/23/2024) for sinusitis/ shingrix.     Chief Complaint:     Chief Complaint   Patient presents with    Follow-up      History of Present Illness:     Adult Annual Physical   Patient here for a comprehensive physical exam , medication refills, & follow-up sinusitis. She is currently taking flonase daily which is not helping. She was not aware that she was rx zrytec as well. She is interested in imaging. Previously a sinus ct was ordered but not covered bc she did not try flonase first.     Diet and Physical Activity  Diet/Nutrition: well balanced diet.   Exercise: no formal exercise.      Depression Screening  PHQ-2/9 Depression Screening           General Health  Sleep: gets 4-6 hours of sleep on average.   Hearing: normal - bilateral.  Vision: most recent eye exam <1 year ago and wears glasses.   Dental: regular dental visits, brushes teeth twice daily, and flosses teeth occasionally.       /GYN Health  Follows with gynecology? no   Patient is: postmenopausal      Advanced Care Planning  Do you have an advanced directive? no  Do you have a durable medical power of ? no     Review of Systems:     Review of Systems   Constitutional:  Negative for chills and fever.   HENT:  Positive for congestion, rhinorrhea and sinus pressure. Negative for ear pain, sinus pain and sore throat.    Eyes:  Negative for pain and visual disturbance.   Respiratory:  Positive for cough. Negative for shortness of breath.    Cardiovascular:  Negative for chest pain and palpitations.    Gastrointestinal:  Negative for abdominal pain and vomiting.   Genitourinary:  Negative for dysuria and hematuria.   Musculoskeletal:  Negative for arthralgias and back pain.   Skin:  Negative for color change and rash.   Neurological:  Negative for seizures and syncope.   All other systems reviewed and are negative.     Past Medical History:     History reviewed. No pertinent past medical history.   Past Surgical History:     Past Surgical History:   Procedure Laterality Date    BILATERAL OOPHORECTOMY Bilateral     BREAST BIOPSY Left 2019     SECTION      x2    HYSTERECTOMY Right       Social History:     Social History     Socioeconomic History    Marital status: Single     Spouse name: None    Number of children: None    Years of education: None    Highest education level: None   Occupational History    None   Tobacco Use    Smoking status: Never     Passive exposure: Never    Smokeless tobacco: Never   Vaping Use    Vaping status: Never Used   Substance and Sexual Activity    Alcohol use: Not Currently    Drug use: Never    Sexual activity: Not Currently     Comment: not    Other Topics Concern    None   Social History Narrative    None     Social Determinants of Health     Financial Resource Strain: Low Risk  (10/4/2022)    Overall Financial Resource Strain (CARDIA)     Difficulty of Paying Living Expenses: Not hard at all   Food Insecurity: No Food Insecurity (10/4/2022)    Hunger Vital Sign     Worried About Running Out of Food in the Last Year: Never true     Ran Out of Food in the Last Year: Never true   Transportation Needs: No Transportation Needs (2023)    PRAPARE - Transportation     Lack of Transportation (Medical): No     Lack of Transportation (Non-Medical): No   Physical Activity: Not on file   Stress: Not on file   Social Connections: Not on file   Intimate Partner Violence: Not on file   Housing Stability: Not on file      Family History:     Family History   Problem  Relation Age of Onset    No Known Problems Mother     Liver cancer Father     Pancreatic cancer Father     No Known Problems Sister     No Known Problems Sister     No Known Problems Sister     No Known Problems Sister     No Known Problems Daughter     No Known Problems Daughter     Vaginal cancer Maternal Grandmother     Uterine cancer Maternal Grandmother     Breast cancer Paternal Aunt 50      Current Medications:     Current Outpatient Medications   Medication Sig Dispense Refill    cetirizine (ZyrTEC) 10 mg tablet Take 1 tablet (10 mg total) by mouth daily 30 tablet 1    fluticasone (FLONASE) 50 mcg/act nasal spray 1 spray into each nostril 2 (two) times a day 11.1 mL 1    hydrOXYzine HCL (ATARAX) 10 mg tablet Take 1 tablet (10 mg total) by mouth every 6 (six) hours as needed for itching 90 tablet 1    lidocaine (LIDODERM) 5 % Apply 1 patch topically over 12 hours daily Remove & Discard patch within 12 hours or as directed by MD 30 patch 12    Zoster Vac Recomb Adjuvanted (Shingrix) 50 MCG/0.5ML SUSR Inject 0.5 mL into a muscle once for 1 dose Repeat dose in 2 to 6 months 1 each 1    acetaminophen (TYLENOL) 500 mg tablet Take 1 tablet (500 mg total) by mouth every 6 (six) hours as needed for moderate pain 30 tablet 0    al mag oxide-diphenhydramine-lidocaine viscous (MAGIC MOUTHWASH) 1:1:1 suspension Swish and spit 10 mL every 4 (four) hours as needed for mouth pain or discomfort 90 mL 0    Calcium Carb-Cholecalciferol (Oyster Shell Calcium w/D) 500-200 MG-UNIT TABS No       calcium carbonate-vitamin D (OSCAL-D) 500 mg-200 units per tablet Take 1 tablet by mouth daily with breakfast 30 tablet 2    Diaper Rash Products (Desitin Multi-Purpose Healing) OINT Apply 1 application. topically 2 (two) times a day 397 g 0    diclofenac sodium (VOLTAREN) 50 mg EC tablet Take 1 tablet (50 mg total) by mouth 2 (two) times a day 120 tablet 0    Melatonin 500 MCG TBDP Take 1 tablet (500 mcg total) by mouth daily at bedtime  "as needed (insomnia)      menthol-cetylpyridinium (CEPACOL) 3 MG lozenge Take 1 lozenge (3 mg total) by mouth as needed for sore throat 9 lozenge 0    naproxen (Naprosyn) 500 mg tablet Take 1 tablet (500 mg total) by mouth 2 (two) times a day with meals 30 tablet 0    omeprazole (PriLOSEC) 20 mg delayed release capsule Take 1 capsule (20 mg total) by mouth daily 90 capsule 1    triamcinolone (KENALOG) 0.1 % cream Apply topically 2 (two) times a day 30 g 0     No current facility-administered medications for this visit.      Allergies:     No Known Allergies   Physical Exam:     /80 (BP Location: Right arm, Patient Position: Sitting, Cuff Size: Standard)   Pulse 70   Temp 98.7 °F (37.1 °C) (Temporal)   Resp 16   Ht 5' 1\" (1.549 m)   Wt 72.1 kg (159 lb)   SpO2 99%   BMI 30.04 kg/m²     Physical Exam  Vitals and nursing note reviewed.   Constitutional:       General: She is not in acute distress.     Appearance: Normal appearance. She is well-developed. She is obese.   HENT:      Head: Normocephalic and atraumatic.      Right Ear: Hearing, tympanic membrane, ear canal and external ear normal.      Left Ear: Hearing, tympanic membrane, ear canal and external ear normal.      Nose: Congestion present. No nasal deformity, septal deviation, signs of injury, laceration, nasal tenderness, mucosal edema or rhinorrhea.   Eyes:      Conjunctiva/sclera: Conjunctivae normal.   Cardiovascular:      Rate and Rhythm: Normal rate and regular rhythm.      Pulses: Normal pulses.      Heart sounds: Normal heart sounds. No murmur heard.  Pulmonary:      Effort: Pulmonary effort is normal. No respiratory distress.      Breath sounds: Normal breath sounds.   Abdominal:      Palpations: Abdomen is soft.      Tenderness: There is no abdominal tenderness.   Musculoskeletal:         General: Normal range of motion.      Cervical back: Normal range of motion and neck supple.   Skin:     General: Skin is warm and dry. "   Neurological:      Mental Status: She is alert and oriented to person, place, and time. Mental status is at baseline.   Psychiatric:         Mood and Affect: Mood normal.         Behavior: Behavior normal.         Thought Content: Thought content normal.         Judgment: Judgment normal.          DIMITRI Bower  [facility]

## 2024-01-23 NOTE — PATIENT INSTRUCTIONS
[facility]  Wellness Visit for Adults   AMBULATORY CARE:   A wellness visit  is when you see your healthcare provider to get screened for health problems. Your healthcare provider will also give you advice on how to stay healthy. Write down your questions so you remember to ask them. Ask your healthcare provider how often you should have a wellness visit.  What happens at a wellness visit:  Your healthcare provider will ask about your health, and your family history of health problems. This includes high blood pressure, heart disease, and cancer. He or she will ask if you have symptoms that concern you, if you smoke, and about your mood. You may also be asked about your intake of medicines, supplements, food, and alcohol. Any of the following may be done:  Your weight  will be checked. Your height may also be checked so your body mass index (BMI) can be calculated. Your BMI shows if you are at a healthy weight.    Your blood pressure  and heart rate will be checked. Your temperature may also be checked.    Blood and urine tests  may be done. Blood tests may be done to check your cholesterol levels. Abnormal cholesterol levels increase your risk for heart disease and stroke. You may also need a blood or urine test to check for diabetes if you are at increased risk. Urine tests may be done to look for signs of an infection or kidney disease.    A physical exam  includes checking your heartbeat and lungs with a stethoscope. Your healthcare provider may also check your skin to look for sun damage.    Screening tests  may be recommended. A screening test is done to check for diseases that may not cause symptoms. The screening tests you may need depend on your age, gender, family history, and lifestyle habits. For example, colorectal screening may be recommended if you are 50 years old or older.    Screening tests you need if you are a woman:   A Pap smear  is used to screen for cervical cancer. Pap smears are usually done  every 3 to 5 years depending on your age. You may need them more often if you have had abnormal Pap smear test results in the past. Ask your healthcare provider how often you should have a Pap smear.    A mammogram  is an x-ray of your breasts to screen for breast cancer. Experts recommend mammograms every 2 years starting at age 50 years. You may need a mammogram at age 49 years or younger if you have an increased risk for breast cancer. Talk to your healthcare provider about when you should start having mammograms and how often you need them.    Vaccines you may need:   Get an influenza vaccine  every year. The influenza vaccine protects you from the flu. Several types of viruses cause the flu. The viruses change over time, so new vaccines are made each year.    Get a tetanus-diphtheria (Td) booster vaccine  every 10 years. This vaccine protects you against tetanus and diphtheria. Tetanus is a severe infection that may cause painful muscle spasms and lockjaw. Diphtheria is a severe bacterial infection that causes a thick covering in the back of your mouth and throat.    Get a human papillomavirus (HPV) vaccine  if you are female and aged 19 to 26 or male 19 to 21 and never received it. This vaccine protects you from HPV infection. HPV is the most common infection spread by sexual contact. HPV may also cause vaginal, penile, and anal cancers.    Get a pneumococcal vaccine  if you are aged 65 years or older. The pneumococcal vaccine is an injection given to protect you from pneumococcal disease. Pneumococcal disease is an infection caused by pneumococcal bacteria. The infection may cause pneumonia, meningitis, or an ear infection.    Get a shingles vaccine  if you are 60 or older, even if you have had shingles before. The shingles vaccine is an injection to protect you from the varicella-zoster virus. This is the same virus that causes chickenpox. Shingles is a painful rash that develops in people who had chickenpox  or have been exposed to the virus.    How to eat healthy:  My Plate is a model for planning healthy meals. It shows the types and amounts of foods that should go on your plate. Fruits and vegetables make up about half of your plate, and grains and protein make up the other half. A serving of dairy is included on the side of your plate. The amount of calories and serving sizes you need depends on your age, gender, weight, and height. Examples of healthy foods are listed below:  Eat a variety of vegetables  such as dark green, red, and orange vegetables. You can also include canned vegetables low in sodium (salt) and frozen vegetables without added butter or sauces.    Eat a variety of fresh fruits , canned fruit in 100% juice, frozen fruit, and dried fruit.    Include whole grains.  At least half of the grains you eat should be whole grains. Examples include whole-wheat bread, wheat pasta, brown rice, and whole-grain cereals such as oatmeal.    Eat a variety of protein foods such as seafood (fish and shellfish), lean meat, and poultry without skin (turkey and chicken). Examples of lean meats include pork leg, shoulder, or tenderloin, and beef round, sirloin, tenderloin, and extra lean ground beef. Other protein foods include eggs and egg substitutes, beans, peas, soy products, nuts, and seeds.    Choose low-fat dairy products such as skim or 1% milk or low-fat yogurt, cheese, and cottage cheese.    Limit unhealthy fats  such as butter, hard margarine, and shortening.       Exercise:  Exercise at least 30 minutes per day on most days of the week. Some examples of exercise include walking, biking, dancing, and swimming. You can also fit in more physical activity by taking the stairs instead of the elevator or parking farther away from stores. Include muscle strengthening activities 2 days each week. Regular exercise provides many health benefits. It helps you manage your weight, and decreases your risk for type 2  diabetes, heart disease, stroke, and high blood pressure. Exercise can also help improve your mood. Ask your healthcare provider about the best exercise plan for you.       General health and safety guidelines:   Do not smoke.  Nicotine and other chemicals in cigarettes and cigars can cause lung damage. Ask your healthcare provider for information if you currently smoke and need help to quit. E-cigarettes or smokeless tobacco still contain nicotine. Talk to your healthcare provider before you use these products.    Limit alcohol.  A drink of alcohol is 12 ounces of beer, 5 ounces of wine, or 1½ ounces of liquor.    Lose weight, if needed.  Being overweight increases your risk of certain health conditions. These include heart disease, high blood pressure, type 2 diabetes, and certain types of cancer.    Protect your skin.  Do not sunbathe or use tanning beds. Use sunscreen with a SPF 15 or higher. Apply sunscreen at least 15 minutes before you go outside. Reapply sunscreen every 2 hours. Wear protective clothing, hats, and sunglasses when you are outside.    Drive safely.  Always wear your seatbelt. Make sure everyone in your car wears a seatbelt. A seatbelt can save your life if you are in an accident. Do not use your cell phone when you are driving. This could distract you and cause an accident. Pull over if you need to make a call or send a text message.    Practice safe sex.  Use latex condoms if are sexually active and have more than one partner. Your healthcare provider may recommend screening tests for sexually transmitted infections (STIs).    Wear helmets, lifejackets, and protective gear.  Always wear a helmet when you ride a bike or motorcycle, go skiing, or play sports that could cause a head injury. Wear protective equipment when you play sports. Wear a lifejacket when you are on a boat or doing water sports.    © Copyright [employer]ative 2023 Information is for End User's use only and may not be sold,  redistributed or otherwise used for commercial purposes.  The above information is an  only. It is not intended as medical advice for individual conditions or treatments. Talk to your doctor, nurse or pharmacist before following any medical regimen to see if it is safe and effective for you.

## 2024-02-06 ENCOUNTER — OFFICE VISIT (OUTPATIENT)
Dept: FAMILY MEDICINE CLINIC | Facility: CLINIC | Age: 59
End: 2024-02-06

## 2024-02-06 ENCOUNTER — HOSPITAL ENCOUNTER (OUTPATIENT)
Dept: CT IMAGING | Facility: HOSPITAL | Age: 59
Discharge: HOME | End: 2024-02-06
Payer: COMMERCIAL

## 2024-02-06 VITALS
WEIGHT: 158.13 LBS | TEMPERATURE: 96.6 F | BODY MASS INDEX: 29.86 KG/M2 | RESPIRATION RATE: 18 BRPM | HEART RATE: 67 BPM | DIASTOLIC BLOOD PRESSURE: 64 MMHG | OXYGEN SATURATION: 98 % | HEIGHT: 61 IN | SYSTOLIC BLOOD PRESSURE: 128 MMHG

## 2024-02-06 DIAGNOSIS — L84 CALLUS OF FOOT: ICD-10-CM

## 2024-02-06 DIAGNOSIS — R73.03 PREDIABETES: Primary | ICD-10-CM

## 2024-02-06 DIAGNOSIS — J32.1 CHRONIC FRONTAL SINUSITIS: ICD-10-CM

## 2024-02-06 DIAGNOSIS — J32.9 CHRONIC SINUSITIS, UNSPECIFIED LOCATION: ICD-10-CM

## 2024-02-06 PROCEDURE — 70486 CT MAXILLOFACIAL W/O DYE: CPT

## 2024-02-06 PROCEDURE — 99214 OFFICE O/P EST MOD 30 MIN: CPT

## 2024-02-06 PROCEDURE — G1004 CDSM NDSC: HCPCS

## 2024-02-06 NOTE — ASSESSMENT & PLAN NOTE
Pt reports that she was told [place] that she has DM. Reassured pt that she remains in prediabetes range. Her poct A1c today is 5.8

## 2024-02-06 NOTE — PROGRESS NOTES
Name: Helga Berg      : 1965      MRN: [id number]  Encounter Provider: DIMITRI Bower  Encounter Date: 2024   Encounter department: [facility]    Assessment & Plan     1. Prediabetes  Assessment & Plan:  Pt reports that she was told in [facility] that she has DM. Reassured pt that she remains in prediabetes range. Her poct A1c today is 5.8      2. Callus of foot  Assessment & Plan:  Pt reports that she has tried multiple creams and lotion without relief.   - I recommend using a foot mask.       3. Chronic frontal sinusitis  Assessment & Plan:  CT sinus done today, results pending. Feels as though flonase and zrytec is providing minimal relief. Requesting ENT referral, referral placed.     Orders:  -     Ambulatory Referral to Otolaryngology; Future        Depression Screening and Follow-up Plan: Patient was screened for depression during today's encounter. They screened negative with a PHQ-2 score of 0.        Subjective      Helga Berg is a 58 y.o. female  has no past medical history on file.  has a past surgical history that includes Bilateral oophorectomy (Bilateral);  section; Breast biopsy (Left, ); and Hysterectomy (Right, ).    She presents today for follow-up.      Review of Systems   Constitutional:  Negative for chills and fever.   HENT:  Negative for ear pain and sore throat.    Eyes:  Negative for pain and visual disturbance.   Respiratory:  Negative for cough and shortness of breath.    Cardiovascular:  Negative for chest pain and palpitations.   Gastrointestinal:  Negative for abdominal pain and vomiting.   Genitourinary:  Negative for dysuria and hematuria.   Musculoskeletal:  Negative for arthralgias and back pain.   Skin:  Negative for color change and rash.   Neurological:  Negative for seizures and syncope.   All other systems reviewed and are negative.      Current Outpatient Medications on File Prior to Visit  "  Medication Sig    acetaminophen (TYLENOL) 500 mg tablet Take 1 tablet (500 mg total) by mouth every 6 (six) hours as needed for moderate pain    al mag oxide-diphenhydramine-lidocaine viscous (MAGIC MOUTHWASH) 1:1:1 suspension Swish and spit 10 mL every 4 (four) hours as needed for mouth pain or discomfort    Calcium Carb-Cholecalciferol (Oyster Shell Calcium w/D) 500-200 MG-UNIT TABS No     calcium carbonate-vitamin D (OSCAL-D) 500 mg-200 units per tablet Take 1 tablet by mouth daily with breakfast    cetirizine (ZyrTEC) 10 mg tablet Take 1 tablet (10 mg total) by mouth daily    Diaper Rash Products (Desitin Multi-Purpose Healing) OINT Apply 1 application. topically 2 (two) times a day    diclofenac sodium (VOLTAREN) 50 mg EC tablet Take 1 tablet (50 mg total) by mouth 2 (two) times a day    fluticasone (FLONASE) 50 mcg/act nasal spray 1 spray into each nostril 2 (two) times a day    hydrOXYzine HCL (ATARAX) 10 mg tablet Take 1 tablet (10 mg total) by mouth every 6 (six) hours as needed for itching    lidocaine (LIDODERM) 5 % Apply 1 patch topically over 12 hours daily Remove & Discard patch within 12 hours or as directed by MD    Melatonin 500 MCG TBDP Take 1 tablet (500 mcg total) by mouth daily at bedtime as needed (insomnia)    menthol-cetylpyridinium (CEPACOL) 3 MG lozenge Take 1 lozenge (3 mg total) by mouth as needed for sore throat    naproxen (Naprosyn) 500 mg tablet Take 1 tablet (500 mg total) by mouth 2 (two) times a day with meals    omeprazole (PriLOSEC) 20 mg delayed release capsule Take 1 capsule (20 mg total) by mouth daily    triamcinolone (KENALOG) 0.1 % cream Apply topically 2 (two) times a day       Objective     /64 (BP Location: Right arm, Patient Position: Sitting, Cuff Size: Standard)   Pulse 67   Temp (!) 96.6 °F (35.9 °C) (Temporal)   Resp 18   Ht 5' 1\" (1.549 m)   Wt 71.7 kg (158 lb 2 oz)   SpO2 98%   BMI 29.88 kg/m²     Physical Exam  Vitals and nursing note reviewed. "   Constitutional:       Appearance: She is overweight.   HENT:      Head: Normocephalic and atraumatic.      Right Ear: External ear normal.      Left Ear: External ear normal.      Nose: Nose normal.   Eyes:      Extraocular Movements: Extraocular movements intact.      Conjunctiva/sclera: Conjunctivae normal.      Pupils: Pupils are equal, round, and reactive to light.   Cardiovascular:      Rate and Rhythm: Normal rate and regular rhythm.      Pulses: Normal pulses.      Heart sounds: Normal heart sounds.   Pulmonary:      Effort: Pulmonary effort is normal.      Breath sounds: Normal breath sounds.   Abdominal:      General: Bowel sounds are normal.      Palpations: Abdomen is soft.      Tenderness: There is no abdominal tenderness.   Musculoskeletal:         General: Normal range of motion.      Cervical back: Normal range of motion.   Feet:      Right foot:      Skin integrity: Callus and dry skin present.      Left foot:      Skin integrity: Callus and dry skin present.   Skin:     General: Skin is warm and dry.      Capillary Refill: Capillary refill takes less than 2 seconds.   Neurological:      General: No focal deficit present.      Mental Status: She is alert and oriented to person, place, and time. Mental status is at baseline.   Psychiatric:         Mood and Affect: Mood normal.         Behavior: Behavior normal.         Thought Content: Thought content normal.         Judgment: Judgment normal.       DIMITRI Bower

## 2024-02-06 NOTE — ASSESSMENT & PLAN NOTE
Pt reports that she has tried multiple creams and lotion without relief.   - I recommend using a foot mask.

## 2024-02-06 NOTE — ASSESSMENT & PLAN NOTE
CT sinus done today, results pending. Feels as though flonase and zrytec is providing minimal relief. Requesting ENT referral, referral placed.

## 2024-02-12 ENCOUNTER — TELEPHONE (OUTPATIENT)
Dept: FAMILY MEDICINE CLINIC | Facility: CLINIC | Age: 59
End: 2024-02-12

## 2024-02-13 DIAGNOSIS — B96.89 ACUTE BACTERIAL SINUSITIS: Primary | ICD-10-CM

## 2024-02-13 DIAGNOSIS — J01.90 ACUTE BACTERIAL SINUSITIS: Primary | ICD-10-CM

## 2024-02-13 RX ORDER — AMOXICILLIN AND CLAVULANATE POTASSIUM 875; 125 MG/1; MG/1
1 TABLET, FILM COATED ORAL EVERY 12 HOURS SCHEDULED
Qty: 14 TABLET | Refills: 0 | Status: SHIPPED | OUTPATIENT
Start: 2024-02-13 | End: 2024-02-21 | Stop reason: ALTCHOICE

## 2024-06-04 ENCOUNTER — TELEPHONE (OUTPATIENT)
Dept: FAMILY MEDICINE CLINIC | Facility: CLINIC | Age: 59
End: 2024-06-04

## 2024-06-04 NOTE — TELEPHONE ENCOUNTER
"Pt called in requesting a chest x-ray to confirm if she has active tb (states required by her job once a year)     Pt states that she had Quantiferon TB done a few days ago (at an outside lab through her employment) and that it came back positive. Thus work is requiring a chest x-ray and advised her to request one from her PCP    Informed Pt that we are unable to see those results and that the most recent Quantiferon TB we have was on Sept 2023.      Pt provided number ([phone]) to inquire about the results. Contacted number, but unable to reach and it was for a \"help at home\" group.     Informed Pt of this and she stated that she will try to bring the results of her test to update her record.     Pt insists if chest-ray can be ordered based on her previous results for her employment.     Please Advise,   Thank you.   "

## 2024-06-27 ENCOUNTER — APPOINTMENT (OUTPATIENT)
Dept: LAB | Facility: HOSPITAL | Age: 59
End: 2024-06-27
Payer: COMMERCIAL

## 2024-06-27 ENCOUNTER — HOSPITAL ENCOUNTER (OUTPATIENT)
Dept: RADIOLOGY | Facility: HOSPITAL | Age: 59
End: 2024-06-27
Payer: COMMERCIAL

## 2024-06-27 DIAGNOSIS — R76.12 POSITIVE QUANTIFERON-TB GOLD TEST: ICD-10-CM

## 2024-06-27 PROCEDURE — 71046 X-RAY EXAM CHEST 2 VIEWS: CPT

## 2024-07-05 ENCOUNTER — TELEPHONE (OUTPATIENT)
Dept: FAMILY MEDICINE CLINIC | Facility: CLINIC | Age: 59
End: 2024-07-05

## 2024-07-18 ENCOUNTER — OFFICE VISIT (OUTPATIENT)
Dept: FAMILY MEDICINE CLINIC | Facility: CLINIC | Age: 59
End: 2024-07-18

## 2024-07-18 ENCOUNTER — APPOINTMENT (OUTPATIENT)
Dept: LAB | Facility: HOSPITAL | Age: 59
End: 2024-07-18
Payer: COMMERCIAL

## 2024-07-18 VITALS
OXYGEN SATURATION: 98 % | TEMPERATURE: 98.3 F | WEIGHT: 156.8 LBS | HEART RATE: 72 BPM | DIASTOLIC BLOOD PRESSURE: 67 MMHG | SYSTOLIC BLOOD PRESSURE: 113 MMHG | HEIGHT: 61 IN | BODY MASS INDEX: 29.6 KG/M2 | RESPIRATION RATE: 18 BRPM

## 2024-07-18 DIAGNOSIS — E03.2 HYPOTHYROIDISM DUE TO MEDICATION: ICD-10-CM

## 2024-07-18 DIAGNOSIS — G89.29 CHRONIC BILATERAL LOW BACK PAIN WITHOUT SCIATICA: ICD-10-CM

## 2024-07-18 DIAGNOSIS — E66.3 OVERWEIGHT (BMI 25.0-29.9): ICD-10-CM

## 2024-07-18 DIAGNOSIS — Z02.89 EXAMINATION, PHYSICAL, EMPLOYEE: Primary | ICD-10-CM

## 2024-07-18 DIAGNOSIS — M54.50 CHRONIC BILATERAL LOW BACK PAIN WITHOUT SCIATICA: ICD-10-CM

## 2024-07-18 LAB
ANION GAP SERPL CALCULATED.3IONS-SCNC: 7 MMOL/L (ref 4–13)
BACTERIA UR QL AUTO: NORMAL /HPF
BILIRUB UR QL STRIP: NEGATIVE
BUN SERPL-MCNC: 23 MG/DL (ref 5–25)
CALCIUM SERPL-MCNC: 9.7 MG/DL (ref 8.4–10.2)
CHLORIDE SERPL-SCNC: 103 MMOL/L (ref 96–108)
CHOLEST SERPL-MCNC: 226 MG/DL
CLARITY UR: CLEAR
CO2 SERPL-SCNC: 32 MMOL/L (ref 21–32)
COLOR UR: YELLOW
CREAT SERPL-MCNC: 0.87 MG/DL (ref 0.6–1.3)
EST. AVERAGE GLUCOSE BLD GHB EST-MCNC: 131 MG/DL
GFR SERPL CREATININE-BSD FRML MDRD: 74 ML/MIN/1.73SQ M
GLUCOSE P FAST SERPL-MCNC: 81 MG/DL (ref 65–99)
GLUCOSE UR STRIP-MCNC: NEGATIVE MG/DL
HBA1C MFR BLD: 6.2 %
HDLC SERPL-MCNC: 61 MG/DL
HGB UR QL STRIP.AUTO: 10
KETONES UR STRIP-MCNC: NEGATIVE MG/DL
LDLC SERPL CALC-MCNC: 139 MG/DL (ref 0–100)
LEUKOCYTE ESTERASE UR QL STRIP: 100
NITRITE UR QL STRIP: NEGATIVE
NON-SQ EPI CELLS URNS QL MICRO: NORMAL /HPF
PH UR STRIP.AUTO: 5 [PH]
POTASSIUM SERPL-SCNC: 3.7 MMOL/L (ref 3.5–5.3)
PROT UR STRIP-MCNC: ABNORMAL MG/DL
RBC #/AREA URNS AUTO: NORMAL /HPF
SODIUM SERPL-SCNC: 142 MMOL/L (ref 135–147)
SP GR UR STRIP.AUTO: 1.03 (ref 1–1.04)
TRIGL SERPL-MCNC: 129 MG/DL
TSH SERPL DL<=0.05 MIU/L-ACNC: 1.09 UIU/ML (ref 0.45–4.5)
UROBILINOGEN UA: NEGATIVE MG/DL
WBC #/AREA URNS AUTO: NORMAL /HPF

## 2024-07-18 PROCEDURE — 99214 OFFICE O/P EST MOD 30 MIN: CPT

## 2024-07-18 PROCEDURE — 36415 COLL VENOUS BLD VENIPUNCTURE: CPT

## 2024-07-18 PROCEDURE — 81001 URINALYSIS AUTO W/SCOPE: CPT

## 2024-07-18 PROCEDURE — 80061 LIPID PANEL: CPT

## 2024-07-18 PROCEDURE — 81003 URINALYSIS AUTO W/O SCOPE: CPT

## 2024-07-18 PROCEDURE — 80048 BASIC METABOLIC PNL TOTAL CA: CPT

## 2024-07-18 PROCEDURE — 84443 ASSAY THYROID STIM HORMONE: CPT

## 2024-07-18 NOTE — ASSESSMENT & PLAN NOTE
- Pain is most likely 2/2 mild spondylotic changes of lumbar spine.   - Lidocaine patches as prescribed before.  - Do not suspect nephrolithiasis. Will do UA for completeness sake.

## 2024-07-18 NOTE — PROGRESS NOTES
Ambulatory Visit  Name: Helga Berg      : 1965      MRN: [id number]  Encounter Provider: DIMITRI Bower  Encounter Date: 2024   [facility]    Assessment & Plan   1. Examination, physical, employee  2. Overweight (BMI 25.0-29.9)  -     Basic metabolic panel; Future  -     Hemoglobin A1C; Future  -     Lipid Panel with Direct LDL reflex; Future  -     UA w Reflex to Microscopic w Reflex to Culture; Future  3. Chronic bilateral low back pain without sciatica  Assessment & Plan:  - Pain is most likely 2/2 mild spondylotic changes of lumbar spine.   - Lidocaine patches as prescribed before.  - Do not suspect nephrolithiasis. Will do UA for completeness sake.   Orders:  -     UA w Reflex to Microscopic w Reflex to Culture; Future  4. Hypothyroidism due to medication  -     TSH w/Reflex; Future    BMI Counseling: Body mass index is 29.63 kg/m². The BMI is above normal. Nutrition recommendations include decreasing portion sizes, encouraging healthy choices of fruits and vegetables, decreasing fast food intake, consuming healthier snacks, limiting drinks that contain sugar, moderation in carbohydrate intake, increasing intake of lean protein, reducing intake of saturated and trans fat and reducing intake of cholesterol. Exercise recommendations include moderate physical activity 150 minutes/week. No pharmacotherapy was ordered. Rationale for BMI follow-up plan is due to patient being overweight or obese.     History of Present Illness     Helga Berg is a 58 y.o. female  has a past medical history of Arthritis, Cataract, GERD (gastroesophageal reflux disease), Prediabetes, Sinusitis, Thyroid cyst, and Thyroid nodule.  has a past surgical history that includes Bilateral oophorectomy (Bilateral);  section; Breast biopsy (Left, ); and Hysterectomy (Right, ).    She presents today for an employee physical. She reports  "that for the last month she has been having bilateral kidney pain in lower back. No fevers, chills, hematuria, dysuria, urinary frequency, urinary urgency. She is concerned it could be her kidneys. No known recent injury.      Review of Systems   Constitutional:  Negative for chills and fever.   HENT:  Negative for ear pain and sore throat.    Eyes:  Negative for pain and visual disturbance.   Respiratory:  Negative for cough and shortness of breath.    Cardiovascular:  Negative for chest pain and palpitations.   Gastrointestinal:  Negative for abdominal pain and vomiting.   Genitourinary:  Negative for dysuria and hematuria.   Musculoskeletal:  Negative for arthralgias and back pain.   Skin:  Negative for color change and rash.   Neurological:  Negative for seizures and syncope.   All other systems reviewed and are negative.      Objective     /67 (BP Location: Left arm, Patient Position: Sitting, Cuff Size: Standard)   Pulse 72   Temp 98.3 °F (36.8 °C) (Temporal)   Resp 18   Ht 5' 1\" (1.549 m)   Wt 71.1 kg (156 lb 12.8 oz)   SpO2 98%   BMI 29.63 kg/m²     Physical Exam  Vitals and nursing note reviewed.   Constitutional:       General: She is not in acute distress.     Appearance: Normal appearance. She is well-developed.   HENT:      Head: Normocephalic and atraumatic.      Right Ear: External ear normal.      Left Ear: External ear normal.      Nose: Nose normal.   Eyes:      Conjunctiva/sclera: Conjunctivae normal.   Cardiovascular:      Rate and Rhythm: Normal rate and regular rhythm.      Pulses: Normal pulses.      Heart sounds: Normal heart sounds. No murmur heard.  Pulmonary:      Effort: Pulmonary effort is normal. No respiratory distress.      Breath sounds: Normal breath sounds.   Abdominal:      General: Bowel sounds are normal.      Palpations: Abdomen is soft.      Tenderness: There is no abdominal tenderness.   Musculoskeletal:         General: No swelling. Normal range of motion.      " Cervical back: Normal range of motion and neck supple.      Lumbar back: Tenderness present.   Skin:     General: Skin is warm and dry.      Capillary Refill: Capillary refill takes less than 2 seconds.   Neurological:      General: No focal deficit present.      Mental Status: She is alert and oriented to person, place, and time. Mental status is at baseline.   Psychiatric:         Mood and Affect: Mood normal.         Behavior: Behavior normal.         Thought Content: Thought content normal.         Judgment: Judgment normal.     Administrative Statements

## 2024-08-29 ENCOUNTER — OFFICE VISIT (OUTPATIENT)
Dept: FAMILY MEDICINE CLINIC | Facility: CLINIC | Age: 59
End: 2024-08-29

## 2024-08-29 VITALS
RESPIRATION RATE: 16 BRPM | WEIGHT: 158.2 LBS | BODY MASS INDEX: 29.87 KG/M2 | HEIGHT: 61 IN | TEMPERATURE: 98.2 F | HEART RATE: 78 BPM | SYSTOLIC BLOOD PRESSURE: 108 MMHG | DIASTOLIC BLOOD PRESSURE: 66 MMHG | OXYGEN SATURATION: 97 %

## 2024-08-29 DIAGNOSIS — E78.5 HYPERLIPIDEMIA, UNSPECIFIED HYPERLIPIDEMIA TYPE: Primary | ICD-10-CM

## 2024-08-29 DIAGNOSIS — R35.0 URINARY FREQUENCY: ICD-10-CM

## 2024-08-29 DIAGNOSIS — R73.03 PREDIABETES: ICD-10-CM

## 2024-08-29 DIAGNOSIS — Z75.8 SPANISH LANGUAGE INTERPRETER NEEDED: ICD-10-CM

## 2024-08-29 DIAGNOSIS — E66.3 OVERWEIGHT: ICD-10-CM

## 2024-08-29 LAB
SL AMB  POCT GLUCOSE, UA: NORMAL
SL AMB LEUKOCYTE ESTERASE,UA: NORMAL
SL AMB POCT BILIRUBIN,UA: NORMAL
SL AMB POCT BLOOD,UA: NORMAL
SL AMB POCT CLARITY,UA: CLEAR
SL AMB POCT COLOR,UA: YELLOW
SL AMB POCT KETONES,UA: NORMAL
SL AMB POCT NITRITE,UA: NORMAL
SL AMB POCT PH,UA: 7.5
SL AMB POCT SPECIFIC GRAVITY,UA: 1.01
SL AMB POCT URINE PROTEIN: NORMAL
SL AMB POCT UROBILINOGEN: NORMAL

## 2024-08-29 PROCEDURE — 99214 OFFICE O/P EST MOD 30 MIN: CPT | Performed by: NURSE PRACTITIONER

## 2024-08-29 PROCEDURE — 81002 URINALYSIS NONAUTO W/O SCOPE: CPT | Performed by: NURSE PRACTITIONER

## 2024-08-29 NOTE — ASSESSMENT & PLAN NOTE
Lab Results   Component Value Date    HGBA1C 6.2 (H) 07/18/2024     We reviewed in depth dietary and lifestyle modifications   Recommend repeat ~1/2025

## 2024-08-29 NOTE — PROGRESS NOTES
Ambulatory Visit  Name: Helga Berg      : 1965      MRN: [id number]  Encounter Provider: DIMITRI Keen  Encounter Date: 2024   Encounter department: [facility]    Assessment & Plan   1. Hyperlipidemia, unspecified hyperlipidemia type  Assessment & Plan:  Lab Results   Component Value Date    CHOLESTEROL 226 (H) 2024    CHOLESTEROL 235 (H) 10/18/2022    CHOLESTEROL 241 (H) 2021     Lab Results   Component Value Date    HDL 61 2024    HDL 52 10/18/2022    HDL 53 2021     Lab Results   Component Value Date    TRIG 129 2024    TRIG 108 10/18/2022    TRIG 106 2021     Lab Results   Component Value Date    NONHDLC 183 10/18/2022    NONHDLC 188 2021    NONHDLC 190 2020     Lab Results   Component Value Date    LDLCALC 139 (H) 2024       2. Prediabetes  Assessment & Plan:  Lab Results   Component Value Date    HGBA1C 6.2 (H) 2024     We reviewed in depth dietary and lifestyle modifications   Recommend repeat ~2025  3. Urinary frequency  Assessment & Plan:  POC Urine Dip without abnormality   Recommend decreasing fluids 2 hours before bed and frequent toilet breaks during the day   Orders:  -     POCT urine dip  4. Overweight  Assessment & Plan:  Wt Readings from Last 3 Encounters:   24 71.8 kg (158 lb 3.2 oz)   24 71.1 kg (156 lb 12.8 oz)   24 71.7 kg (158 lb)     -Encouraged diet and lifestyle changes: decrease processed foods (cakes, cookies, chips, soda), decrease total carbohydrate intake, decrease fried/fatty foods, increase fruits and vegetables, increase lean proteins (chicken, turkey), increase healthy fats (avocado, fish, nuts), drink plenty of water (at least four 16 oz bottles per day)    5. Frisian  needed         History of Present Illness     Patient is a 59 YO female who has a past medical history of  has a past medical history of Arthritis,  Cataract, GERD (gastroesophageal reflux disease), Prediabetes, Sinusitis, Thyroid cyst, and Thyroid nodule who presents today for follow up. She would like to review labs done in July. She is also having urinary frequency. No dysuria, fever, incontinence.     The following portions of the patient's history were reviewed and updated as appropriate: allergies, current medications, past family history, past medical history, past social history, past surgical history and problem list.        Review of Systems   Constitutional:  Negative for chills and fever.   HENT:  Negative for ear pain and sore throat.    Eyes:  Negative for pain and visual disturbance.   Respiratory:  Negative for cough and shortness of breath.    Cardiovascular:  Negative for chest pain and palpitations.   Gastrointestinal:  Negative for abdominal pain and vomiting.   Genitourinary:  Positive for frequency. Negative for difficulty urinating, dysuria, flank pain and hematuria.   Musculoskeletal:  Negative for arthralgias and back pain.   Skin:  Negative for color change and rash.   Neurological:  Negative for seizures and syncope.   All other systems reviewed and are negative.    Past Medical History:   Diagnosis Date    Arthritis     Cataract     GERD (gastroesophageal reflux disease)     Prediabetes     Sinusitis     Thyroid cyst     Thyroid nodule      Past Surgical History:   Procedure Laterality Date    BILATERAL OOPHORECTOMY Bilateral     BREAST BIOPSY Left 2019     SECTION      x2    HYSTERECTOMY Right 2004     Family History   Problem Relation Age of Onset    No Known Problems Mother     Liver cancer Father     Pancreatic cancer Father     No Known Problems Sister     No Known Problems Sister     No Known Problems Sister     No Known Problems Sister     Breast cancer Paternal Aunt 50    Vaginal cancer Maternal Grandmother     Uterine cancer Maternal Grandmother     No Known Problems Daughter     No Known Problems Daughter      "Allergies Son         asprin, Ibuprofen     Social History     Tobacco Use    Smoking status: Never     Passive exposure: Never    Smokeless tobacco: Never   Vaping Use    Vaping status: Never Used   Substance and Sexual Activity    Alcohol use: Not Currently    Drug use: Never    Sexual activity: Not Currently     Comment: not      Current Outpatient Medications on File Prior to Visit   Medication Sig    cetirizine (ZyrTEC) 10 mg tablet Take 1 tablet (10 mg total) by mouth daily    fluticasone (FLONASE) 50 mcg/act nasal spray 1 spray into each nostril 2 (two) times a day    hydrOXYzine HCL (ATARAX) 10 mg tablet Take 1 tablet (10 mg total) by mouth every 6 (six) hours as needed for itching    levothyroxine 50 mcg tablet Take 50 mcg by mouth daily    lidocaine (LIDODERM) 5 % Apply 1 patch topically over 12 hours daily Remove & Discard patch within 12 hours or as directed by MD    Melatonin 500 MCG TBDP Take 1 tablet (500 mcg total) by mouth daily at bedtime as needed (insomnia)    omeprazole (PriLOSEC) 20 mg delayed release capsule Take 1 capsule (20 mg total) by mouth daily     No Known Allergies  Immunization History   Administered Date(s) Administered    COVID-19 MODERNA VACC 0.5 ML IM 04/29/2021, 05/26/2021    Tdap 01/23/2024    Zoster Vaccine Recombinant 01/23/2024     Objective     /66 (BP Location: Right arm, Patient Position: Sitting, Cuff Size: Standard)   Pulse 78   Temp 98.2 °F (36.8 °C) (Temporal)   Resp 16   Ht 5' 1\" (1.549 m)   Wt 71.8 kg (158 lb 3.2 oz)   SpO2 97%   BMI 29.89 kg/m²     Physical Exam  Vitals and nursing note reviewed.   Constitutional:       General: She is not in acute distress.     Appearance: She is well-developed.   HENT:      Head: Normocephalic and atraumatic.      Right Ear: External ear normal.      Left Ear: External ear normal.   Eyes:      General:         Right eye: No discharge.         Left eye: No discharge.      Conjunctiva/sclera: Conjunctivae " normal.   Cardiovascular:      Rate and Rhythm: Normal rate and regular rhythm.   Pulmonary:      Effort: Pulmonary effort is normal. No respiratory distress.      Breath sounds: Normal breath sounds.   Abdominal:      Palpations: Abdomen is soft.      Tenderness: There is no abdominal tenderness.   Musculoskeletal:         General: No swelling.      Cervical back: Neck supple.   Skin:     General: Skin is warm and dry.      Capillary Refill: Capillary refill takes less than 2 seconds.   Neurological:      Mental Status: She is alert.   Psychiatric:         Mood and Affect: Mood normal.

## 2024-08-29 NOTE — ASSESSMENT & PLAN NOTE
Lab Results   Component Value Date    CHOLESTEROL 226 (H) 07/18/2024    CHOLESTEROL 235 (H) 10/18/2022    CHOLESTEROL 241 (H) 08/03/2021     Lab Results   Component Value Date    HDL 61 07/18/2024    HDL 52 10/18/2022    HDL 53 08/03/2021     Lab Results   Component Value Date    TRIG 129 07/18/2024    TRIG 108 10/18/2022    TRIG 106 08/03/2021     Lab Results   Component Value Date    NONHDLC 183 10/18/2022    NONHDLC 188 08/03/2021    NONHDLC 190 07/07/2020     Lab Results   Component Value Date    LDLCALC 139 (H) 07/18/2024

## 2024-08-29 NOTE — PATIENT INSTRUCTIONS
Breast Cancer Screening    Breast cancer is the most common cancer in females in the [place] and the second most common cause of cancer death in women    The risk of breast cancer from birth to death is 12.9 percent (1 in 8 women)    Approximately 43,000 women will die in the [place] annually from breast cancer    Mammography    A mammogram is an x-ray of your breasts to screen for breast cancer    It uses the least amount of radiation necessary to provide the highest quality image able to detect early signs of breast cancer.    For most women, we recommend getting your first mammogram at the age of 40 and repeating it yearly         Signs of Breast Cancer    Lumps  Thickening or swelling of parts of the breast  Irritation or dimpling of breast skin  Red or flaky skin in the nipple area  Pain in the nipple area  Pulling in of the nipple  Change in size or shape of the breast  Pain in any area of the breast     Did you know by getting a mammogram, doctors can find breast cancer 3 YEARS before a lump is even felt!    Early detection is BEST!  Schedule your mammogram TODAY!     To schedule this appointment with [facility], please contact Central Scheduling at [phone]

## 2024-08-30 PROBLEM — R35.0 URINARY FREQUENCY: Status: ACTIVE | Noted: 2024-08-30

## 2024-08-30 PROBLEM — E66.3 OVERWEIGHT: Status: ACTIVE | Noted: 2022-05-04

## 2024-08-30 NOTE — ASSESSMENT & PLAN NOTE
Wt Readings from Last 3 Encounters:   08/29/24 71.8 kg (158 lb 3.2 oz)   07/18/24 71.1 kg (156 lb 12.8 oz)   02/21/24 71.7 kg (158 lb)     -Encouraged diet and lifestyle changes: decrease processed foods (cakes, cookies, chips, soda), decrease total carbohydrate intake, decrease fried/fatty foods, increase fruits and vegetables, increase lean proteins (chicken, turkey), increase healthy fats (avocado, fish, nuts), drink plenty of water (at least four 16 oz bottles per day)

## 2024-08-30 NOTE — ASSESSMENT & PLAN NOTE
POC Urine Dip without abnormality   Recommend decreasing fluids 2 hours before bed and frequent toilet breaks during the day

## 2025-01-03 ENCOUNTER — TELEPHONE (OUTPATIENT)
Dept: FAMILY MEDICINE CLINIC | Facility: CLINIC | Age: 60
End: 2025-01-03

## 2025-01-03 DIAGNOSIS — F41.9 ANXIETY: ICD-10-CM

## 2025-01-03 RX ORDER — HYDROXYZINE HYDROCHLORIDE 10 MG/1
10 TABLET, FILM COATED ORAL EVERY 6 HOURS PRN
Qty: 90 TABLET | Refills: 1 | Status: SHIPPED | OUTPATIENT
Start: 2025-01-03

## 2025-01-03 NOTE — TELEPHONE ENCOUNTER
Patient came by asking for refills on:          hydrOXYzine HCL (ATARAX) 10 mg tablet     Please advise

## 2025-01-09 NOTE — TELEPHONE ENCOUNTER
Pt called requesting an update on the request. Informed that it was sent to her pharmacy on 1/3/25

## 2025-03-08 ENCOUNTER — HOSPITAL ENCOUNTER (EMERGENCY)
Facility: HOSPITAL | Age: 60
Discharge: HOME | End: 2025-03-09
Payer: COMMERCIAL

## 2025-03-08 VITALS
SYSTOLIC BLOOD PRESSURE: 158 MMHG | TEMPERATURE: 97.5 F | HEART RATE: 62 BPM | DIASTOLIC BLOOD PRESSURE: 88 MMHG | WEIGHT: 167.11 LBS | RESPIRATION RATE: 16 BRPM | BODY MASS INDEX: 31.57 KG/M2 | OXYGEN SATURATION: 99 %

## 2025-03-08 DIAGNOSIS — M54.50 ACUTE LOW BACK PAIN: Primary | ICD-10-CM

## 2025-03-08 DIAGNOSIS — N39.0 UTI (URINARY TRACT INFECTION): ICD-10-CM

## 2025-03-08 LAB
ALBUMIN SERPL BCG-MCNC: 3.9 G/DL (ref 3.5–5)
ALP SERPL-CCNC: 73 U/L (ref 34–104)
ALT SERPL W P-5'-P-CCNC: 17 U/L (ref 7–52)
ANION GAP SERPL CALCULATED.3IONS-SCNC: 5 MMOL/L (ref 4–13)
AST SERPL W P-5'-P-CCNC: 22 U/L (ref 13–39)
BACTERIA UR QL AUTO: ABNORMAL /HPF
BASOPHILS # BLD AUTO: 0.03 THOUSANDS/ÂΜL (ref 0–0.1)
BASOPHILS NFR BLD AUTO: 0 % (ref 0–1)
BILIRUB SERPL-MCNC: 0.23 MG/DL (ref 0.2–1)
BILIRUB UR QL STRIP: NEGATIVE
BUN SERPL-MCNC: 23 MG/DL (ref 5–25)
CALCIUM SERPL-MCNC: 8.6 MG/DL (ref 8.4–10.2)
CHLORIDE SERPL-SCNC: 105 MMOL/L (ref 96–108)
CLARITY UR: CLEAR
CO2 SERPL-SCNC: 27 MMOL/L (ref 21–32)
COLOR UR: COLORLESS
CREAT SERPL-MCNC: 0.71 MG/DL (ref 0.6–1.3)
EOSINOPHIL # BLD AUTO: 0.36 THOUSAND/ÂΜL (ref 0–0.61)
EOSINOPHIL NFR BLD AUTO: 5 % (ref 0–6)
ERYTHROCYTE [DISTWIDTH] IN BLOOD BY AUTOMATED COUNT: 13.9 % (ref 11.6–15.1)
GFR SERPL CREATININE-BSD FRML MDRD: 94 ML/MIN/1.73SQ M
GLUCOSE SERPL-MCNC: 103 MG/DL (ref 65–140)
GLUCOSE UR STRIP-MCNC: NEGATIVE MG/DL
HCT VFR BLD AUTO: 36.2 % (ref 34.8–46.1)
HGB BLD-MCNC: 11.8 G/DL (ref 11.5–15.4)
HGB UR QL STRIP.AUTO: NEGATIVE
IMM GRANULOCYTES # BLD AUTO: 0.02 THOUSAND/UL (ref 0–0.2)
IMM GRANULOCYTES NFR BLD AUTO: 0 % (ref 0–2)
KETONES UR STRIP-MCNC: NEGATIVE MG/DL
LEUKOCYTE ESTERASE UR QL STRIP: ABNORMAL
LIPASE SERPL-CCNC: 26 U/L (ref 11–82)
LYMPHOCYTES # BLD AUTO: 3.88 THOUSANDS/ÂΜL (ref 0.6–4.47)
LYMPHOCYTES NFR BLD AUTO: 51 % (ref 14–44)
MCH RBC QN AUTO: 26.8 PG (ref 26.8–34.3)
MCHC RBC AUTO-ENTMCNC: 32.6 G/DL (ref 31.4–37.4)
MCV RBC AUTO: 82 FL (ref 82–98)
MONOCYTES # BLD AUTO: 0.66 THOUSAND/ÂΜL (ref 0.17–1.22)
MONOCYTES NFR BLD AUTO: 9 % (ref 4–12)
NEUTROPHILS # BLD AUTO: 2.7 THOUSANDS/ÂΜL (ref 1.85–7.62)
NEUTS SEG NFR BLD AUTO: 35 % (ref 43–75)
NITRITE UR QL STRIP: NEGATIVE
NON-SQ EPI CELLS URNS QL MICRO: ABNORMAL /HPF
NRBC BLD AUTO-RTO: 0 /100 WBCS
PH UR STRIP.AUTO: 5.5 [PH]
PLATELET # BLD AUTO: 224 THOUSANDS/UL (ref 149–390)
PMV BLD AUTO: 11.2 FL (ref 8.9–12.7)
POTASSIUM SERPL-SCNC: 4.4 MMOL/L (ref 3.5–5.3)
PROT SERPL-MCNC: 6.6 G/DL (ref 6.4–8.4)
PROT UR STRIP-MCNC: NEGATIVE MG/DL
RBC # BLD AUTO: 4.41 MILLION/UL (ref 3.81–5.12)
RBC #/AREA URNS AUTO: ABNORMAL /HPF
SODIUM SERPL-SCNC: 137 MMOL/L (ref 135–147)
SP GR UR STRIP.AUTO: 1 (ref 1–1.03)
UROBILINOGEN UR STRIP-ACNC: <2 MG/DL
WBC # BLD AUTO: 7.65 THOUSAND/UL (ref 4.31–10.16)
WBC #/AREA URNS AUTO: ABNORMAL /HPF

## 2025-03-08 PROCEDURE — 85025 COMPLETE CBC W/AUTO DIFF WBC: CPT

## 2025-03-08 PROCEDURE — 83690 ASSAY OF LIPASE: CPT

## 2025-03-08 PROCEDURE — 96374 THER/PROPH/DIAG INJ IV PUSH: CPT

## 2025-03-08 PROCEDURE — 80053 COMPREHEN METABOLIC PANEL: CPT

## 2025-03-08 PROCEDURE — 81001 URINALYSIS AUTO W/SCOPE: CPT

## 2025-03-08 PROCEDURE — 36415 COLL VENOUS BLD VENIPUNCTURE: CPT

## 2025-03-08 PROCEDURE — 99283 EMERGENCY DEPT VISIT LOW MDM: CPT

## 2025-03-08 PROCEDURE — 99285 EMERGENCY DEPT VISIT HI MDM: CPT

## 2025-03-08 RX ORDER — LIDOCAINE 50 MG/G
1 PATCH TOPICAL ONCE
Status: DISCONTINUED | OUTPATIENT
Start: 2025-03-08 | End: 2025-03-09 | Stop reason: HOSPADM

## 2025-03-08 RX ORDER — KETOROLAC TROMETHAMINE 30 MG/ML
15 INJECTION, SOLUTION INTRAMUSCULAR; INTRAVENOUS ONCE
Status: COMPLETED | OUTPATIENT
Start: 2025-03-08 | End: 2025-03-08

## 2025-03-08 RX ADMIN — KETOROLAC TROMETHAMINE 15 MG: 30 INJECTION, SOLUTION INTRAMUSCULAR; INTRAVENOUS at 23:09

## 2025-03-08 RX ADMIN — LIDOCAINE 5% 1 PATCH: 700 PATCH TOPICAL at 23:09

## 2025-03-09 ENCOUNTER — APPOINTMENT (EMERGENCY)
Dept: CT IMAGING | Facility: HOSPITAL | Age: 60
End: 2025-03-09
Payer: COMMERCIAL

## 2025-03-09 PROCEDURE — 74177 CT ABD & PELVIS W/CONTRAST: CPT

## 2025-03-09 RX ORDER — CEPHALEXIN 500 MG/1
500 CAPSULE ORAL EVERY 6 HOURS SCHEDULED
Qty: 40 CAPSULE | Refills: 0 | Status: SHIPPED | OUTPATIENT
Start: 2025-03-09 | End: 2025-03-19

## 2025-03-09 RX ADMIN — CEPHALEXIN 500 MG: 250 CAPSULE ORAL at 01:44

## 2025-03-09 RX ADMIN — IOHEXOL 100 ML: 350 INJECTION, SOLUTION INTRAVENOUS at 00:38

## 2025-03-09 NOTE — ED PROVIDER NOTES
Time reflects when diagnosis was documented in both MDM as applicable and the Disposition within this note       Time User Action Codes Description Comment    3/9/2025  1:34 AM Shahbaz Bennett Add [M54.50] Acute low back pain     3/9/2025  1:43 AM Shahbaz Bennett Add [N39.0] UTI (urinary tract infection)           ED Disposition       ED Disposition   Discharge    Condition   Stable    Date/Time   Sun Mar 9, 2025  1:34 AM    Comment   Helgachivo Berg discharge to home/self care.                   Assessment & Plan       Medical Decision Making  Patient with history as below presented with back pain. History obtained from patient.    After initial evaluation differential diagnosis includes: Muscular strain, muscular spasm, sciatica, pyelonephritis, urinary tract infection, kidney stone, obstruction.     Plan: CBC, CMP, lipase, urine, CT abdomen pelvis, Toradol    ED summary: Labs reviewed and unremarkable. Independently reviewed imaging without acute emergent pathology. Patient was treated with below with improvement in symptoms. Reassessed the patient and they continue to be well appearing.  Urine does not seem to show infection, however patient distantly states that this feels very similar to previous urinary tract infection, because of this we will treat with Keflex.  Additional sent to pharmacy.  May also represent musculoskeletal back pain.  Stable for outpatient management.    Disposition: Discharged with instructions to obtain outpatient follow up of patient's symptoms and findings, with strict return precautions if patient develops new or worsening symptoms. Patient understands this plan and is agreeable. All questions answered. Patient discharged home with return precautions.    Amount and/or Complexity of Data Reviewed  Labs: ordered.  Radiology: ordered.    Risk  Prescription drug management.        ED Course as of 03/09/25 0319   Sun Mar 09, 2025   0140 Discussed urine results with patient. She is  reporting that this feels very similar to previous UTI and is requesting antibiotic. Because of similar symptoms to previous will give keflex.        Medications   lidocaine (LIDODERM) 5 % patch 1 patch (1 patch Topical Medication Applied 3/8/25 2309)   ketorolac (TORADOL) injection 15 mg (15 mg Intravenous Given 3/8/25 2309)   iohexol (OMNIPAQUE) 350 MG/ML injection (MULTI-DOSE) 100 mL (100 mL Intravenous Given 3/9/25 0038)   cephalexin (KEFLEX) capsule 500 mg (500 mg Oral Given 3/9/25 0144)       ED Risk Strat Scores                            SBIRT 20yo+      Flowsheet Row Most Recent Value   Initial Alcohol Screen: US AUDIT-C     1. How often do you have a drink containing alcohol? 0 Filed at: 2025   2. How many drinks containing alcohol do you have on a typical day you are drinking?  0 Filed at: 2025   3b. FEMALE Any Age, or MALE 65+: How often do you have 4 or more drinks on one occassion? 0 Filed at: 2025   Audit-C Score 0 Filed at: 2025   AVANI: How many times in the past year have you...    Used an illegal drug or used a prescription medication for non-medical reasons? Never Filed at: 2025                            History of Present Illness       Chief Complaint   Patient presents with    Back Pain     Pt reports b/l lower back pain that began today. Pt reports pain radiates from her back to the back of her left leg. Pt also reports nausea.       Past Medical History:   Diagnosis Date    Arthritis     Cataract     GERD (gastroesophageal reflux disease)     Prediabetes     Sinusitis     Thyroid cyst     Thyroid nodule       Past Surgical History:   Procedure Laterality Date    BILATERAL OOPHORECTOMY Bilateral     BREAST BIOPSY Left 2019     SECTION      x2    HYSTERECTOMY Right 2004      Family History   Problem Relation Age of Onset    No Known Problems Mother     Liver cancer Father     Pancreatic cancer Father     No Known Problems Sister      No Known Problems Sister     No Known Problems Sister     No Known Problems Sister     Breast cancer Paternal Aunt 50    Vaginal cancer Maternal Grandmother     Uterine cancer Maternal Grandmother     No Known Problems Daughter     No Known Problems Daughter     Allergies Son         asprin, Ibuprofen      Social History     Tobacco Use    Smoking status: Never     Passive exposure: Never    Smokeless tobacco: Never   Vaping Use    Vaping status: Never Used   Substance Use Topics    Alcohol use: Not Currently    Drug use: Never      E-Cigarette/Vaping    E-Cigarette Use Never User       E-Cigarette/Vaping Substances    Nicotine No     THC No     CBD No     Flavoring No     Other No     Unknown No       I have reviewed and agree with the history as documented.     Patient is a 59-year-old female with a significant past medical history of GERD, prediabetes, presenting for evaluation of back pain.  Patient reports that over the last day or so she has been having some pain in her right-sided lower back.  This occasionally is some radiation down the leg.  Says that this feels like previous urinary tract infections, although she is not having any dysuria, urinary frequency, hematuria.  She denies any fevers.  She does not remember any enticing event to her pain.  She denies any numbness/saddle anesthesia.  She is otherwise without acute complaint.        Review of Systems   Constitutional:  Negative for fever.   Respiratory:  Negative for shortness of breath.    Cardiovascular:  Negative for chest pain.   Gastrointestinal:  Negative for abdominal pain, nausea and vomiting.   Genitourinary:  Negative for dysuria and hematuria.   Musculoskeletal:  Positive for back pain.           Objective       ED Triage Vitals [03/08/25 2222]   Temperature Pulse Blood Pressure Respirations SpO2 Patient Position - Orthostatic VS   97.5 °F (36.4 °C) 62 158/88 16 99 % Sitting      Temp Source Heart Rate Source BP Location FiO2 (%) Pain  Score    Oral Monitor Right arm -- 8      Vitals      Date and Time Temp Pulse SpO2 Resp BP Pain Score FACES Pain Rating User   03/08/25 2317 -- -- -- -- -- 8 --    03/08/25 2222 97.5 °F (36.4 °C) 62 99 % 16 158/88 8 -- ES            Physical Exam  Vitals and nursing note reviewed.   Constitutional:       General: She is not in acute distress.     Appearance: Normal appearance. She is not ill-appearing or toxic-appearing.   HENT:      Head: Normocephalic and atraumatic.      Right Ear: External ear normal.      Left Ear: External ear normal.      Nose: Nose normal.   Eyes:      General: No scleral icterus.        Right eye: No discharge.         Left eye: No discharge.      Extraocular Movements: Extraocular movements intact.      Conjunctiva/sclera: Conjunctivae normal.   Cardiovascular:      Rate and Rhythm: Normal rate.      Heart sounds: Normal heart sounds. No murmur heard.     No friction rub. No gallop.   Pulmonary:      Effort: Pulmonary effort is normal. No respiratory distress.      Breath sounds: Normal breath sounds.   Abdominal:      General: Abdomen is flat. There is no distension.      Palpations: Abdomen is soft. There is no mass.      Tenderness: There is no abdominal tenderness.   Genitourinary:     Comments: Deferred  Musculoskeletal:      Comments: Minimal tenderness of the right sided paralumbar muscles, no bony vertebral tenderness. No stepoffs, deformities or skin changes. Full ROM. Strength of bilateral lower extremities 5/5 in ankle flexion and extension. EHL intact. Sensation intact including S1 distribution. DP/PT pulses 2+/4.    Skin:     General: Skin is warm and dry.   Neurological:      General: No focal deficit present.      Mental Status: She is alert.         Results Reviewed       Procedure Component Value Units Date/Time    Urine Microscopic [[id number]]  (Abnormal) Collected: 03/08/25 2312    Lab Status: Final result Specimen: Urine, Clean Catch Updated: 03/08/25 2350     RBC,  UA None Seen /hpf      WBC, UA 4-10 /hpf      Epithelial Cells Occasional /hpf      Bacteria, UA None Seen /hpf     CMP [[id number]] Collected: 03/08/25 2312    Lab Status: Final result Specimen: Blood from Arm, Right Updated: 03/08/25 2339     Sodium 137 mmol/L      Potassium 4.4 mmol/L      Chloride 105 mmol/L      CO2 27 mmol/L      ANION GAP 5 mmol/L      BUN 23 mg/dL      Creatinine 0.71 mg/dL      Glucose 103 mg/dL      Calcium 8.6 mg/dL      AST 22 U/L      ALT 17 U/L      Alkaline Phosphatase 73 U/L      Total Protein 6.6 g/dL      Albumin 3.9 g/dL      Total Bilirubin 0.23 mg/dL      eGFR 94 ml/min/1.73sq m     Narrative:      National Kidney Disease Foundation guidelines for Chronic Kidney Disease (CKD):     Stage 1 with normal or high GFR (GFR > 90 mL/min/1.73 square meters)    Stage 2 Mild CKD (GFR = 60-89 mL/min/1.73 square meters)    Stage 3A Moderate CKD (GFR = 45-59 mL/min/1.73 square meters)    Stage 3B Moderate CKD (GFR = 30-44 mL/min/1.73 square meters)    Stage 4 Severe CKD (GFR = 15-29 mL/min/1.73 square meters)    Stage 5 End Stage CKD (GFR <15 mL/min/1.73 square meters)  Note: GFR calculation is accurate only with a steady state creatinine    Lipase [[id number]]  (Normal) Collected: 03/08/25 2312    Lab Status: Final result Specimen: Blood from Arm, Right Updated: 03/08/25 2339     Lipase 26 u/L     UA w Reflex to Microscopic w Reflex to Culture [[id number]]  (Abnormal) Collected: 03/08/25 2312    Lab Status: Final result Specimen: Urine, Clean Catch Updated: 03/08/25 2321     Color, UA Colorless     Clarity, UA Clear     Specific Gravity, UA 1.004     pH, UA 5.5     Leukocytes, UA Small     Nitrite, UA Negative     Protein, UA Negative mg/dl      Glucose, UA Negative mg/dl      Ketones, UA Negative mg/dl      Urobilinogen, UA <2.0 mg/dl      Bilirubin, UA Negative     Occult Blood, UA Negative    CBC and differential [[id number]]  (Abnormal) Collected: 03/08/25 2312    Lab Status: Final result  Specimen: Blood from Arm, Right Updated: 25 2320     WBC 7.65 Thousand/uL      RBC 4.41 Million/uL      Hemoglobin 11.8 g/dL      Hematocrit 36.2 %      MCV 82 fL      MCH 26.8 pg      MCHC 32.6 g/dL      RDW 13.9 %      MPV 11.2 fL      Platelets 224 Thousands/uL      nRBC 0 /100 WBCs      Segmented % 35 %      Immature Grans % 0 %      Lymphocytes % 51 %      Monocytes % 9 %      Eosinophils Relative 5 %      Basophils Relative 0 %      Absolute Neutrophils 2.70 Thousands/µL      Absolute Immature Grans 0.02 Thousand/uL      Absolute Lymphocytes 3.88 Thousands/µL      Absolute Monocytes 0.66 Thousand/µL      Eosinophils Absolute 0.36 Thousand/µL      Basophils Absolute 0.03 Thousands/µL             CT abdomen pelvis with contrast   Final Interpretation by Quinten Sam DO ([id number])      No CT evidence of acute findings.         Workstation performed: [id number]             Procedures    ED Medication and Procedure Management   Prior to Admission Medications   Prescriptions Last Dose Informant Patient Reported? Taking?   Melatonin 500 MCG TBDP   No No   Sig: Take 1 tablet (500 mcg total) by mouth daily at bedtime as needed (insomnia)   cetirizine (ZyrTEC) 10 mg tablet   No No   Sig: Take 1 tablet (10 mg total) by mouth daily   fluticasone (FLONASE) 50 mcg/act nasal spray   No No   Si spray into each nostril 2 (two) times a day   hydrOXYzine HCL (ATARAX) 10 mg tablet   No No   Sig: Take 1 tablet (10 mg total) by mouth every 6 (six) hours as needed for itching   levothyroxine 50 mcg tablet   Yes No   Sig: Take 50 mcg by mouth daily   lidocaine (LIDODERM) 5 %   No No   Sig: Apply 1 patch topically over 12 hours daily Remove & Discard patch within 12 hours or as directed by MD   omeprazole (PriLOSEC) 20 mg delayed release capsule   No No   Sig: Take 1 capsule (20 mg total) by mouth daily      Facility-Administered Medications: None     Discharge Medication List as of 3/9/2025  1:40 AM        START taking  these medications    Details   cephalexin (KEFLEX) 500 mg capsule Take 1 capsule (500 mg total) by mouth every 6 (six) hours for 10 days, Starting Sun 3/9/2025, Until Wed 3/19/2025, Normal           CONTINUE these medications which have NOT CHANGED    Details   cetirizine (ZyrTEC) 10 mg tablet Take 1 tablet (10 mg total) by mouth daily, Starting Tue 1/23/2024, Normal      fluticasone (FLONASE) 50 mcg/act nasal spray 1 spray into each nostril 2 (two) times a day, Starting Tue 1/23/2024, Normal      hydrOXYzine HCL (ATARAX) 10 mg tablet Take 1 tablet (10 mg total) by mouth every 6 (six) hours as needed for itching, Starting Fri 1/3/2025, Normal      levothyroxine 50 mcg tablet Take 50 mcg by mouth daily, Historical Med      lidocaine (LIDODERM) 5 % Apply 1 patch topically over 12 hours daily Remove & Discard patch within 12 hours or as directed by MD, Starting Tue 1/23/2024, Normal      Melatonin 500 MCG TBDP Take 1 tablet (500 mcg total) by mouth daily at bedtime as needed (insomnia), Starting Mon 2/20/2023, OTC      omeprazole (PriLOSEC) 20 mg delayed release capsule Take 1 capsule (20 mg total) by mouth daily, Starting Mon 8/7/2023, Normal           No discharge procedures on file.  ED SEPSIS DOCUMENTATION   Time reflects when diagnosis was documented in both MDM as applicable and the Disposition within this note       Time User Action Codes Description Comment    3/9/2025  1:34 AM Shahbaz Bennett [M54.50] Acute low back pain     3/9/2025  1:43 AM Shahbaz Bennett [N39.0] UTI (urinary tract infection)                  Shahbaz Bennett DO  03/09/25 0319

## 2025-03-14 DIAGNOSIS — M54.2 NECK PAIN: ICD-10-CM

## 2025-03-14 DIAGNOSIS — M54.50 CHRONIC LOW BACK PAIN, UNSPECIFIED BACK PAIN LATERALITY, UNSPECIFIED WHETHER SCIATICA PRESENT: ICD-10-CM

## 2025-03-14 DIAGNOSIS — G89.29 CHRONIC LOW BACK PAIN, UNSPECIFIED BACK PAIN LATERALITY, UNSPECIFIED WHETHER SCIATICA PRESENT: ICD-10-CM

## 2025-03-14 RX ORDER — LIDOCAINE 50 MG/G
1 PATCH TOPICAL DAILY
Qty: 30 PATCH | Refills: 0 | OUTPATIENT
Start: 2025-03-14

## 2025-03-27 ENCOUNTER — OFFICE VISIT (OUTPATIENT)
Dept: FAMILY MEDICINE CLINIC | Facility: CLINIC | Age: 60
End: 2025-03-27

## 2025-03-27 VITALS
HEART RATE: 76 BPM | TEMPERATURE: 97.9 F | WEIGHT: 162 LBS | HEIGHT: 61 IN | RESPIRATION RATE: 18 BRPM | BODY MASS INDEX: 30.58 KG/M2 | OXYGEN SATURATION: 97 % | DIASTOLIC BLOOD PRESSURE: 64 MMHG | SYSTOLIC BLOOD PRESSURE: 112 MMHG

## 2025-03-27 DIAGNOSIS — Z12.31 ENCOUNTER FOR SCREENING MAMMOGRAM FOR BREAST CANCER: ICD-10-CM

## 2025-03-27 DIAGNOSIS — R73.03 PREDIABETES: ICD-10-CM

## 2025-03-27 DIAGNOSIS — J32.1 CHRONIC FRONTAL SINUSITIS: ICD-10-CM

## 2025-03-27 DIAGNOSIS — G47.00 INSOMNIA, UNSPECIFIED TYPE: ICD-10-CM

## 2025-03-27 DIAGNOSIS — E66.9 OBESITY (BMI 30-39.9): ICD-10-CM

## 2025-03-27 DIAGNOSIS — Z00.00 ANNUAL PHYSICAL EXAM: Primary | ICD-10-CM

## 2025-03-27 DIAGNOSIS — Z23 ENCOUNTER FOR IMMUNIZATION: ICD-10-CM

## 2025-03-27 LAB — SL AMB POCT HEMOGLOBIN AIC: 5.6 (ref ?–6.5)

## 2025-03-27 PROCEDURE — 83036 HEMOGLOBIN GLYCOSYLATED A1C: CPT

## 2025-03-27 PROCEDURE — 99396 PREV VISIT EST AGE 40-64: CPT

## 2025-03-27 PROCEDURE — 99214 OFFICE O/P EST MOD 30 MIN: CPT

## 2025-03-27 PROCEDURE — 90471 IMMUNIZATION ADMIN: CPT

## 2025-03-27 PROCEDURE — 90750 HZV VACC RECOMBINANT IM: CPT

## 2025-03-27 RX ORDER — CETIRIZINE HYDROCHLORIDE 10 MG/1
10 TABLET ORAL DAILY
Qty: 90 TABLET | Refills: 0 | Status: SHIPPED | OUTPATIENT
Start: 2025-03-27

## 2025-03-27 RX ORDER — TRAZODONE HYDROCHLORIDE 50 MG/1
50 TABLET ORAL
Qty: 30 TABLET | Refills: 2 | Status: SHIPPED | OUTPATIENT
Start: 2025-03-27

## 2025-03-27 RX ORDER — FLUTICASONE PROPIONATE 50 MCG
1 SPRAY, SUSPENSION (ML) NASAL DAILY
Qty: 11.1 ML | Refills: 1 | Status: SHIPPED | OUTPATIENT
Start: 2025-03-27

## 2025-03-27 NOTE — PATIENT INSTRUCTIONS
"Patient Education     Routine physical for adults   The Basics   Written by the doctors and editors at [facility]   What is a physical? -- A physical is a routine visit, or \"check-up,\" with your doctor. You might also hear it called a \"wellness visit\" or \"preventive visit.\"  During each visit, the doctor will:   Ask about your physical and mental health   Ask about your habits, behaviors, and lifestyle   Do an exam   Give you vaccines if needed   Talk to you about any medicines you take   Give advice about your health   Answer your questions  Getting regular check-ups is an important part of taking care of your health. It can help your doctor find and treat any problems you have. But it's also important for preventing health problems.  A routine physical is different from a \"sick visit.\" A sick visit is when you see a doctor because of a health concern or problem. Since physicals are scheduled ahead of time, you can think about what you want to ask the doctor.  How often should I get a physical? -- It depends on your age and health. In general, for people age 21 years and older:   If you are younger than 50 years, you might be able to get a physical every 3 years.   If you are 50 years or older, your doctor might recommend a physical every year.  If you have an ongoing health condition, like diabetes or high blood pressure, your doctor will probably want to see you more often.  What happens during a physical? -- In general, each visit will include:   Physical exam - The doctor or nurse will check your height, weight, heart rate, and blood pressure. They will also look at your eyes and ears. They will ask about how you are feeling and whether you have any symptoms that bother you.   Medicines - It's a good idea to bring a list of all the medicines you take to each doctor visit. Your doctor will talk to you about your medicines and answer any questions. Tell them if you are having any side effects that bother you. You " "should also tell them if you are having trouble paying for any of your medicines.   Habits and behaviors - This includes:   Your diet   Your exercise habits   Whether you smoke, drink alcohol, or use drugs   Whether you are sexually active   Whether you feel safe at home  Your doctor will talk to you about things you can do to improve your health and lower your risk of health problems. They will also offer help and support. For example, if you want to quit smoking, they can give you advice and might prescribe medicines. If you want to improve your diet or get more physical activity, they can help you with this, too.   Lab tests, if needed - The tests you get will depend on your age and situation. For example, your doctor might want to check your:   Cholesterol   Blood sugar   Iron level   Vaccines - The recommended vaccines will depend on your age, health, and what vaccines you already had. Vaccines are very important because they can prevent certain serious or deadly infections.   Discussion of screening - \"Screening\" means checking for diseases or other health problems before they cause symptoms. Your doctor can recommend screening based on your age, risk, and preferences. This might include tests to check for:   Cancer, such as breast, prostate, cervical, ovarian, colorectal, prostate, lung, or skin cancer   Sexually transmitted infections, such as chlamydia and gonorrhea   Mental health conditions like depression and anxiety  Your doctor will talk to you about the different types of screening tests. They can help you decide which screenings to have. They can also explain what the results might mean.   Answering questions - The physical is a good time to ask the doctor or nurse questions about your health. If needed, they can refer you to other doctors or specialists, too.  Adults older than 65 years often need other care, too. As you get older, your doctor will talk to you about:   How to prevent falling at " home   Hearing or vision tests   Memory testing   How to take your medicines safely   Making sure that you have the help and support you need at home  All topics are updated as new evidence becomes available and our peer review process is complete.  This topic retrieved from [employer] on: May 02, 2024.  Topic 754935 Version 1.0  Release: 32.4.3 - C32.122  © 2024 UpToDate, Inc. and/or its affiliates. All rights reserved.  Consumer Information Use and Disclaimer   Disclaimer: This generalized information is a limited summary of diagnosis, treatment, and/or medication information. It is not meant to be comprehensive and should be used as a tool to help the user understand and/or assess potential diagnostic and treatment options. It does NOT include all information about conditions, treatments, medications, side effects, or risks that may apply to a specific patient. It is not intended to be medical advice or a substitute for the medical advice, diagnosis, or treatment of a health care provider based on the health care provider's examination and assessment of a patient's specific and unique circumstances. Patients must speak with a health care provider for complete information about their health, medical questions, and treatment options, including any risks or benefits regarding use of medications. This information does not endorse any treatments or medications as safe, effective, or approved for treating a specific patient. UpToDate, Inc. and its affiliates disclaim any warranty or liability relating to this information or the use thereof.The use of this information is governed by the Terms of Use, available at https://www.[employer]IBuwer.com/en/know/clinical-effectiveness-terms. 2024© UpToDate, Inc. and its affiliates and/or licensors. All rights reserved.  Copyright   © 2024 UpToDate, Inc. and/or its affiliates. All rights reserved.

## 2025-03-27 NOTE — ASSESSMENT & PLAN NOTE
-POCT A1C is 5.6. This is improved from 6.2. Congratulated patient    Orders:    POCT hemoglobin A1c

## 2025-03-27 NOTE — PROGRESS NOTES
Adult Annual Physical  Name: Helga Berg      : 1965      MRN: [id number]  Encounter Provider: DIMITRI Oliveira  Encounter Date: 3/27/2025   Encounter department: [facility]    Assessment & Plan  Annual physical exam         Prediabetes  -POCT A1C is 5.6. This is improved from 6.2. Congratulated patient    Orders:    POCT hemoglobin A1c    Insomnia, unspecified type  -Take trazodone 50 mg HS  -  about good sleep hygiene such as going to bed the same time everyday, avoiding caffeine/alcohol, looking at phone or devices that give off light before bed and keeping your bedroom dark, cool and quiet.   Orders:    traZODone (DESYREL) 50 mg tablet; Take 1 tablet (50 mg total) by mouth daily at bedtime    Obesity (BMI 30-39.9)  Body mass index is 30.61 kg/m².  The BMI is above normal. Nutrition recommendations include reducing portion sizes, decreasing overall calorie intake, 3-5 servings of fruits/vegetables daily, reducing fast food intake, consuming healthier snacks, decreasing soda and/or juice intake, moderation in carbohydrate intake, increasing intake of lean protein, reducing intake of saturated fat and trans fat and reducing intake of cholesterol. Exercise recommendations include moderate aerobic physical activity for 150 minutes/week.      Orders:    Comprehensive metabolic panel; Future    CBC and differential; Future    TSH, 3rd generation with Free T4 reflex; Future    Lipid panel; Future    Encounter for screening mammogram for breast cancer    Orders:    Mammo screening bilateral w 3d and cad; Future    Encounter for immunization    Orders:    Zoster Vaccine Recombinant IM    Chronic frontal sinusitis    Orders:    fluticasone (FLONASE) 50 mcg/act nasal spray; 1 spray into each nostril daily    cetirizine (ZyrTEC) 10 mg tablet; Take 1 tablet (10 mg total) by mouth daily      Preventive Screenings:    - Breast cancer screening: screening up-to-date      Immunizations:  - Immunizations due: Influenza    BMI Counseling: Body mass index is 30.61 kg/m². The BMI is above normal. Nutrition recommendations include decreasing portion sizes, encouraging healthy choices of fruits and vegetables, decreasing fast food intake, consuming healthier snacks, limiting drinks that contain sugar, moderation in carbohydrate intake, increasing intake of lean protein, reducing intake of saturated and trans fat and reducing intake of cholesterol. Exercise recommendations include exercising 3-5 times per week. No pharmacotherapy was ordered. Patient referred to PCP. Rationale for BMI follow-up plan is due to patient being overweight or obese.     Depression Screening and Follow-up Plan: Patient was screened for depression during today's encounter. They screened negative with a PHQ-2 score of 0.          History of Present Illness     Adult Annual Physical:  Patient presents for annual physical. Helga Berg is a 59 y.o. with  has a past medical history of Arthritis, Cataract, GERD (gastroesophageal reflux disease), Prediabetes, Sinusitis, Thyroid cyst, and Thyroid nodule.     Patient  presents to the clinic for management of her chronic medical conditions.  Patient has no further complaints other than what is mentioned in the ROS.  .     Diet and Physical Activity:  - Diet/Nutrition: no special diet and heart healthy (low sodium) diet.  - Exercise: no formal exercise.    Depression Screening:  - PHQ-2 Score: 0    General Health:  - Sleep: sleeps poorly.  - Hearing: normal hearing bilateral ears.  - Vision: vision problems and wears glasses.  - Dental: brushes teeth twice daily.    /GYN Health:  - Follows with GYN: no.   - Menopause: postmenopausal.   - History of STDs: no    Review of Systems   Constitutional: Negative.  Negative for chills, fatigue and fever.   HENT:  Positive for sinus pressure, sinus pain and sneezing. Negative for ear pain and sore throat.    Eyes: Negative.  " Negative for pain and visual disturbance.   Respiratory: Negative.  Negative for cough and shortness of breath.    Cardiovascular: Negative.  Negative for chest pain and palpitations.   Gastrointestinal: Negative.  Negative for abdominal pain and vomiting.   Genitourinary: Negative.  Negative for dysuria and hematuria.   Musculoskeletal: Negative.  Negative for arthralgias and back pain.   Skin: Negative.  Negative for color change and rash.   Neurological: Negative.  Negative for seizures and syncope.   Hematological: Negative.    Psychiatric/Behavioral:  Positive for sleep disturbance. Negative for behavioral problems.    All other systems reviewed and are negative.        Objective   /64 (BP Location: Left arm, Patient Position: Sitting, Cuff Size: Standard)   Pulse 76   Temp 97.9 °F (36.6 °C) (Temporal)   Resp 18   Ht 5' 1\" (1.549 m)   Wt 73.5 kg (162 lb)   SpO2 97%   BMI 30.61 kg/m²     Physical Exam  Vitals and nursing note reviewed.   Constitutional:       General: She is not in acute distress.     Appearance: Normal appearance. She is well-developed. She is obese.   HENT:      Head: Normocephalic and atraumatic.      Right Ear: Tympanic membrane normal.      Left Ear: Tympanic membrane normal.      Nose: Nose normal.      Comments: Erythematous nasal septum     Mouth/Throat:      Mouth: Mucous membranes are moist.   Eyes:      Conjunctiva/sclera: Conjunctivae normal.   Cardiovascular:      Rate and Rhythm: Normal rate and regular rhythm.      Pulses: Normal pulses.      Heart sounds: Normal heart sounds. No murmur heard.  Pulmonary:      Effort: Pulmonary effort is normal. No respiratory distress.      Breath sounds: Normal breath sounds.   Abdominal:      General: Abdomen is flat. Bowel sounds are normal.      Palpations: Abdomen is soft.      Tenderness: There is no abdominal tenderness.   Musculoskeletal:         General: No swelling. Normal range of motion.      Cervical back: Normal range " of motion and neck supple.   Skin:     General: Skin is warm and dry.      Capillary Refill: Capillary refill takes less than 2 seconds.   Neurological:      General: No focal deficit present.      Mental Status: She is alert and oriented to person, place, and time. Mental status is at baseline.   Psychiatric:         Mood and Affect: Mood normal.         Behavior: Behavior normal.         Thought Content: Thought content normal.         Judgment: Judgment normal.

## 2025-03-27 NOTE — ASSESSMENT & PLAN NOTE
-Take trazodone 50 mg HS  -  about good sleep hygiene such as going to bed the same time everyday, avoiding caffeine/alcohol, looking at phone or devices that give off light before bed and keeping your bedroom dark, cool and quiet.   Orders:    traZODone (DESYREL) 50 mg tablet; Take 1 tablet (50 mg total) by mouth daily at bedtime

## 2025-03-27 NOTE — ASSESSMENT & PLAN NOTE
Orders:    fluticasone (FLONASE) 50 mcg/act nasal spray; 1 spray into each nostril daily    cetirizine (ZyrTEC) 10 mg tablet; Take 1 tablet (10 mg total) by mouth daily

## 2025-04-02 ENCOUNTER — TELEPHONE (OUTPATIENT)
Dept: FAMILY MEDICINE CLINIC | Facility: CLINIC | Age: 60
End: 2025-04-02

## 2025-04-02 DIAGNOSIS — Z12.4 CERVICAL CANCER SCREENING: Primary | ICD-10-CM

## 2025-04-02 NOTE — TELEPHONE ENCOUNTER
Hi patient would like to be referred to ob gyn for a pap smear. Patient states having Pain around ovary on the right side  of it. Please advise if order can be placed ?     Thank you!

## 2025-05-09 ENCOUNTER — ANNUAL EXAM (OUTPATIENT)
Dept: OBGYN CLINIC | Facility: CLINIC | Age: 60
End: 2025-05-09

## 2025-05-09 VITALS
DIASTOLIC BLOOD PRESSURE: 72 MMHG | WEIGHT: 162 LBS | BODY MASS INDEX: 30.58 KG/M2 | SYSTOLIC BLOOD PRESSURE: 110 MMHG | HEIGHT: 61 IN

## 2025-05-09 DIAGNOSIS — R22.32 AXILLARY LUMP, LEFT: ICD-10-CM

## 2025-05-09 DIAGNOSIS — Z12.31 ENCOUNTER FOR SCREENING MAMMOGRAM FOR BREAST CANCER: ICD-10-CM

## 2025-05-09 DIAGNOSIS — Z01.411 ENCOUNTER FOR GYNECOLOGICAL EXAMINATION WITH ABNORMAL FINDING: Primary | ICD-10-CM

## 2025-05-09 DIAGNOSIS — Z12.4 CERVICAL CANCER SCREENING: ICD-10-CM

## 2025-05-09 NOTE — PROGRESS NOTES
Assessment / Plan    Assessment & Plan  Encounter for gynecological examination with abnormal finding  Well woman exam  Status post hysterectomy with oophorectomy at age 38, unknown reason.  Cervical cancer screening no longer indicated.  She is up-to-date on colonoscopy       Axillary lump, left  Small firm lump, approximately 3 mm.  Benign characteristics and probably blocked gland.  Diagnostic imaging ordered for clinical correlation.    Orders:    US Breast Axilla Left; Future    Encounter for screening mammogram for breast cancer  Patient has order and screening scheduled for September 2025           Subjective      Helga Berg is a 59 y.o. female who presents for her annual gynecologic exam.    New patient  59-year-old G3, P3 postmenopausal female  Divehi speaking.  [facility] interpretation services utilized    Story of hysterectomy and oophorectomy at age 38.  Patient does not recall why this was done.  She indicates that she found a lump under her left arm about 2 to 3 months ago.  Her last mammogram was in 2023 and was benign.  She has screening mammogram scheduled this coming September.  Family history of breast cancer in a paternal aunt.    No Pap on record; she does not recall when it was last done.  Pap Hx: normal  STD hx: none  Sexually active: not currently  Last mammogram: 8/2023 mammogram benign; scheduled 9/26/2025 2015 left core breast biopsy, benign  Paternal aunt breast cancer age 45  9/2021 colonoscopy normal, repeat 7 years  Nutrition: Body mass index is 30.61 kg/m².; given guidelines  Calcium intake: given guidelines  Exercise: no; given guidelines  Safety: feels safe    Periods are absent  Current contraception: status post hysterectomy  History of abnormal Pap smear: no  Family history of breast,uterine, ovarian or colon cancer: yes - breast pat aunt; MGM uterine ca      The following portions of the patient's history were reviewed and updated as appropriate: allergies, current  "medications, past family history, past medical history, past social history, past surgical history, and problem list.    Review of Systems      Review of Systems   Constitutional:  Negative for chills and fever.   Respiratory:  Negative for cough and shortness of breath.    Gastrointestinal:  Negative for abdominal distention, abdominal pain, blood in stool, constipation, diarrhea, nausea and vomiting.   Genitourinary:  Negative for difficulty urinating, dysuria, frequency, genital sores, hematuria, menstrual problem, pelvic pain, urgency, vaginal bleeding and vaginal discharge.   Musculoskeletal:  Negative for arthralgias and myalgias.     Breasts:  Negative for skin changes, dimpling, asymmetry, nipple discharge, redness, tenderness or palpable masses  Feels lump under her left arm.    Objective     *patient agrees to exam without a chaperone present.     /72 (BP Location: Left arm, Patient Position: Sitting, Cuff Size: Standard)   Ht 5' 1\" (1.549 m)   Wt 73.5 kg (162 lb)   BMI 30.61 kg/m²   Physical Exam  Constitutional:       General: She is not in acute distress.     Appearance: Normal appearance. She is well-developed. She is not ill-appearing or diaphoretic.      Comments: Body mass index is 30.61 kg/m².     HENT:      Head: Normocephalic and atraumatic.   Eyes:      Pupils: Pupils are equal, round, and reactive to light.   Neck:      Thyroid: No thyromegaly.   Pulmonary:      Effort: Pulmonary effort is normal.   Chest:   Breasts:     Breasts are symmetrical.      Right: No inverted nipple, mass, nipple discharge, skin change or tenderness.      Left: No inverted nipple, mass, nipple discharge, skin change or tenderness.   Abdominal:      General: There is no distension.      Palpations: Abdomen is soft. There is no mass.      Tenderness: There is no abdominal tenderness. There is no guarding or rebound.   Genitourinary:     General: Normal vulva.      Exam position: Lithotomy position.      Labia:  "        Right: No rash, tenderness, lesion or injury.         Left: No rash, tenderness, lesion or injury.       Vagina: No signs of injury and foreign body. No vaginal discharge, erythema, tenderness or bleeding.      Adnexa:         Right: No mass or tenderness.          Left: No mass or tenderness.        Rectum: Normal.      Comments: Cervix uterus and ovaries surgically absent  Musculoskeletal:      Cervical back: Neck supple.   Lymphadenopathy:      Cervical: No cervical adenopathy.      Upper Body:      Right upper body: No supraclavicular adenopathy.      Left upper body: No supraclavicular adenopathy.   Skin:     General: Skin is warm and dry.   Neurological:      General: No focal deficit present.      Mental Status: She is alert and oriented to person, place, and time.   Psychiatric:         Mood and Affect: Mood normal.         Behavior: Behavior normal.         Thought Content: Thought content normal.         Judgment: Judgment normal.

## 2025-05-09 NOTE — PATIENT INSTRUCTIONS
"Patient Education     Dieta y dharmesh   Conceptos Básicos   Redactado por los médicos y editores de [employer]   ¿Por qué es importante llevar tremayne dieta saludable? -- Llevar tremayne dieta saludable es importante porque consumir los alimentos adecuados puede ayudarlo a conservar acosta dharmesh ahora y más adelante. Puede reducir el riesgo de problemas ronan enfermedad cardíaca, diabetes, presión arterial kranthi y algunos tipos de cáncer. También puede ayudarlo a vivir más tiempo y mejorar acosta calidad de milvia.  ¿Cuál es la mejor clase de dieta? -- Los expertos no recomiendan ninguna dieta específica para todos. La gente elige qué alimentos consumir por tremayne gran variedad de motivos, ronan acosta preferencia personal, motivos culturales o religiosos, alergias o intolerancias, y objetivos nutricionales. Además, deben tener en cuenta el costo de los distintos alimentos y la posibilidad de conseguirlos.  En general, los expertos recomiendan tremayne dieta que:   Incluya muchas verduras, frutas, legumbres, sanjuanita secos y cereales integrales   Limite el consumo de sana conner y procesadas, grasas no saludables, azúcar, sal y alcohol  ¿Qué son los patrones alimentarios? -- Por lo general, un \"patrón\" alimentario significa consumir ciertos tipos de alimentos y limitar otros. Algunas personas deben seguir un patrón alimentario determinado debido a necesidades de dharmesh. Por ejemplo, si tiene presión arterial kranthi, acosta médico podría recomendarle tremayne dieta baja en sal.  Si está tratando de mejorar acosta dharmesh en general, puede ser de ayuda escoger un patrón alimentario saludable. No implica necesariamente ser muy estricto con respecto a lo que come y lo que dakota comer. La idea es que piense en consumir muchos alimentos saludables y a la vez limite los alimentos menos saludables.  Estos son algunos ejemplos de patrones alimentarios saludables:   Dieta mediterránea - Consiste en consumir muchas frutas, verduras, sanjuanita secos y cereales integrales, y usar aceite " de russell en lugar de otras grasas. La dieta también contiene algo de pescado, aves y productos lácteos, donna no tremayne gran cantidad de sana conner. Seguir esta dieta puede ser de ayuda para la dharmesh en general, y hasta podría reducir el riesgo de tener un accidente cerebrovascular (derrame).   Dietas basadas en verduras - Estos patrones dan prioridad a las verduras, las frutas, los cereales, las legumbres y los sanjuanita secos. Limitan o evitan los alimentos de origen animal, ronan la carne y los lácteos. Hay muchos tipos de dietas basadas en verduras, ronan la vegetariana y la vegana.   Dieta baja en grasa - Tremayne dieta baja en grasa consiste en limitar las calorías que provienen de las grasas. [place] podría ayudar a algunas personas a no subir de peso, si lalita es acosta objetivo, donna no tiene muchos más beneficios para la dharmesh. Si elige tremayne dieta baja en grasa, también es importante que se asegure de consumir muchos cereales integrales, legumbres, frutas y verduras. Limite el consumo de cereales y azúcar refinados.   Dieta baja en colesterol - El colesterol está presente en alimentos que contienen muchas grasas saturadas, ronan las sana conner, la mantequilla y el queso. Tremayne dieta baja en colesterol hace hincapié en limitar la cantidad de colesterol que consume. Limitar el colesterol en la dieta también puede ayudar a reducir la cantidad de grasas no saludables consumidas.  ¿Qué alimentos son particularmente saludables? -- Estos son algunos alimentos particularmente saludables:   Frutas y verduras - Consumir tremayne dieta con muchas frutas y verduras puede ayudar a prevenir las enfermedades cardíacas y los accidentes cerebrovasculares (derrames). También podría ayudar a prevenir algunos tipos de cáncer. Trate de consumir frutas y verduras en cada comida y también ronan refrigerio. Si no tiene acceso a frutas y verduras frescas, puede consumirlas congeladas o en norma. Los médicos recomiendan consumir un mínimo de fabiana porciones de  "frutas o verduras por día.   Cereales integrales - Entre los cereales integrales se cuentan el pan elaborado con sherrill cien por ciento integral, la david cortada al sana y las pastas elaboradas con cereales integrales. Son más saludables que los alimentos elaborados con cereales \"refinados\", ronan el pan hurley y el arroz hurley. Se ha demostrado que consumir muchos cereales integrales en vez de cereales refinados ayuda a controlar el peso. También puede reducir el riesgo de padecer varios problemas de dharmesh, ronan el cáncer de colon, la enfermedad cardíaca y la diabetes. Los médicos recomiendan que la mayoría de las personas intenten comer de 5 a 8 porciones de alimentos integrales con un alto contenido de fibra por día.   Alimentos con fibra - Consumir alimentos con mucha fibra puede ayudar a prevenir las enfermedades cardíacas y los accidentes cerebrovasculares (derrames). Si tiene diabetes tipo 2, también puede ayudar a controlar acosta nivel de azúcar en bret. Algunos alimentos que tienen mucha fibra son las verduras, las frutas, los frijoles, las nueces, el cereal de david y los panes y cereales integrales. Puede averiguar qué cantidad de fibra tiene un alimento si tony la etiqueta de información nutricional (figura 1). Los médicos recomiendan que la mayoría de las personas consuman alrededor de 25 a 34 gramos de fibra por día.   Alimentos con calcio y vitamina D - Los bebés, niños y adultos necesitan calcio y vitamina D para que angel huesos velia zeina. Los adultos también necesitan calcio y vitamina D para ayudar a prevenir la osteoporosis. La osteoporosis es un padecimiento que hace que los huesos se debiliten y se rompan con más facilidad que de costumbre. Distintos alimentos y bebidas tienen calcio y vitamina D (figura 2). Es posible que las personas que no reciben suficiente calcio y vitamina D en acosta dieta deban tana un suplemento. Los médicos recomiendan que la mayoría de las personas consuman de 2 a 3 " "porciones de alimentos con calcio y vitamina D todos los días.   Alimentos que contienen proteína - Las proteínas contribuyen a que los músculos y los huesos se mantengan zeina. Algunos alimentos saludables con un alto contenido de proteína son el maximino, el pescado, los huevos, los frijoles, los sanjuanita secos y los productos a base de soja. La carne rubi también tiene mucha proteína, donna además contiene grasas que pueden ser poco saludables. Los médicos recomiendan que la mayoría de las personas intenten consumir alrededor de 5 porciones de proteína por día.   Grasas saludables - Existen distintas clases de grasas. Algunas son mejores para el organismo que otras. Las grasas saludables son grasas \"monoinsaturadas\" o \"poliinsaturadas\". Están presentes en los pescados grasos, en los sanjuanita secos, en las mantequillas de sanjuanita secos y en el aguacate. Use aceites vegetales para cocinar. Algunos ejemplos de estos aceites son el de russell, el de canola, el de cártamo, el de girasol y el de maíz. Consumir alimentos con grasas saludables, y evitar o limitar aquellos con grasas no saludables, podría reducir el riesgo de padecer enfermedad cardíaca.   Alimentos con folato - El folato es tremayne vitamina importante para las personas embarazadas, ya que ayuda a prevenir ciertos defectos de nacimiento. También se llama \"ácido fólico\". Todas las personas que podrían quedar embarazadas deben recibir al menos 400 microgramos diarios de ácido fólico, tanto si están buscando un embarazo ronan si no. El folato se encuentra en muchos cereales de desayuno, en las naranjas, en el jugo de naranja y en las verduras de hojas verdes.  ¿Qué alimentos debería evitar o limitar? -- Para tener tremayne dieta saludable, hay algunas cosas que debería evitar o limitar. Son, entre otros:   Grasas no saludables - Las grasas \"trans\" son particularmente poco saludables. Se encuentran en margarinas, muchas comidas rápidas y algunos alimentos horneados comprados en " "tiendas. Las grasas \"saturadas\" se encuentran en productos de origen animal ronan las adonay, la yema de huevo, la mantequilla, el queso y los productos lácteos enteros. Las grasas no saludables pueden elevar el nivel de colesterol y aumentar las posibilidades de sufrir enfermedad cardíaca.   Azúcar - Para tener tremayne dieta saludable, es importante limitar o evitar el azúcar agregado, los dulces y los cereales refinados. Los cereales refinados se encuentran en el pan hurley, el arroz hurley, la mayoría de las pastas y la mayoría de los alimentos de \"refrigerio\" envasados.  Evitar las bebidas endulzadas con azúcar, ronan las gaseosas y las bebidas deportivas, también puede contribuir a mejorar la dharmesh.  Evite las frutas enlatadas en almíbar con un alto porcentaje de azúcar.   Adonay conner y procesadas - Se ha demostrado en estudios que consumir carne rubi en gran cantidad puede aumentar el riesgo de padecer ciertos problemas de dharmesh, entre ellos enfermedad cardíaca y cáncer. Debe limitar la cantidad de carne rubi que consume. Lo mismo aplica a las adonay procesadas, por ejemplo las salchichas, los hot dogs y el tocino (alas).  ¿Puedo beber alcohol ronan parte de tremayne dieta saludable? -- No beber nada de alcohol es la opción más saludable. Beber regularmente puede aumentar las posibilidades de tener enfermedad hepática y ciertos tipos de cáncer. En las personas de sexo femenino, incluso tremayne martita copa por día puede aumentar el riesgo de desarrollar cáncer de seno.  Si decide beber, la mayoría de los médicos recomiendan consumir no más de:   1 copa al día para las personas de sexo femenino   2 copas al día para las personas de sexo masculino  Los límites son distintos porque, en general, el cuerpo femenino tarda más en procesar el alcohol.  ¿Cuántas calorías necesito por día? -- Las calorías le dan energía a acosta cuerpo. La cantidad de calorías que necesita por día depende de acosta peso, estatura, edad, sexo y nivel de " "actividad.  Acosta médico o enfermero puede decirle aproximadamente cuántas calorías debe consumir por día. También puede colaborar con un nutricionista (experto en nutrición) para informarse sobre angel necesidades y opciones alimentarias.  ¿Qué pasa si me theresa mejorar mi dieta? -- Cambiar angel hábitos de alimentación puede ser difícil. Recuerde que hasta los pequeños cambios pueden mejorar acosta dharmesh.  Estas sugerencias podrían ser de ayuda:   Trate de incorporar frutas y verduras a todas angel comidas. Si no tiene frutas y verduras frescas, las congeladas y enlatadas son buenas opciones. Busque productos que no tengan israel ni azúcar agregado.   Tenga frutas a la vista para comerlas ronan refrigerio.   Cuando pueda, opte por cereales integrales en lugar de refinados. Opte por consumir maximino, pescado y legumbres en lugar de sana conner y queso.   Trate de consumir alimentos preparados y procesados con menos frecuencia.   Pruebe a beber agua o agua con gas saborizada en lugar de gaseosa o jugo.   Cuando coma en restaurantes de comida rápida, busque las opciones más saludables del menú, ronan maximion asado o tremayne ensalada.  Si tiene alguna pregunta sobre cuáles alimentos debe consumir y cuáles no, consulte a acosta médico, enfermero o nutricionista. La dieta adecuada para usted depende, en parte, de acosta dharmesh y de los padecimientos médicos que tenga.  Todos los artículos se actualizan a medida que se descubre nueva evidencia y culmina nuestro proceso de evaluación por homólogos   Nahed artículo se recuperó de UpToDate el: Feb 28, 2024.  Artículo 19844 Versión 26.0.es-419.1  Release: 32.2.4 - C32.58  © 2024 [employer] Todos los derechos reservados.  figura 1: Etiqueta de información nutricional - Fibra     Nahed es un ejemplo de termayne etiqueta de información nutricional. Para saber cuánta fibra contiene un alimento, consulte el renglón que dice \"fibra dietaria\". También es importante observar el tamaño de la porción. Nahed alimento tiene " "7 gramos de fibra en cada porción y cada porción es tremayne taza.  Gráfico [id number] Versión 8.0  figura 2: Alimentos y bebidas con calcio y vitamina D     Entre los alimentos ricos en calcio se incluyen el helado, la leche de soya, los panes, la col rizada, el brócoli, la leche, el queso, el requesón, las almendras, el yogur, los cereales listos para comer, los frijoles y el tofu. Entre los alimentos ricos en vitamina D se incluyen la leche, las \"leches\" vegetales fortificadas (soya, almendras), el atún en norma, el aceite de hígado de bacalao, el yogur, los cereales listos para comer, el salmón cocido, las nasir en norma, la caballa y los huevos. Algunos de estos alimentos son ricos en ambas cosas.  Gráfico [id number] Versión 4.0  Exención de responsabilidad y uso de la información del consumidor   Descargo de responsabilidad: esta información generalizada es un resumen limitado de información sobre el diagnóstico, el tratamiento y/o los medicamentos. No pretende ser exhaustiva y se debe utilizar ronan herramienta para ayudar al usuario a comprender y/o evaluar las posibles opciones de diagnóstico y tratamiento. No incluye toda la información sobre afecciones, tratamientos, medicamentos, efectos secundarios o riesgos puedan ser aplicables a un paciente específico. No tiene el propósito de servir ronan recomendación médica ni de sustituir la recomendación médica, el diagnóstico o el tratamiento de un profesional de atención médica que se base en el examen y la evaluación de alexis profesional de la dharmesh respecto a las circunstancias específicas y únicas del paciente. Los pacientes deben hablar con un profesional de atención médica para obtener información completa sobre acosta dharmesh, cuestiones médicas y opciones de tratamiento, incluidos los riesgos o los beneficios relacionados con el uso de medicamentos. Esta información no certifica que los tratamientos o medicamentos velia seguros, eficaces o estén aprobados para tratar a un " paciente específico. [employer] y angel afiliados renuncian a cualquier garantía o responsabilidad relacionada con esta información o el uso de la misma.El uso de esta información está sujeto a las Condiciones de uso, disponibles en https://www.[email or web address]/en/know/clinical-effectiveness-terms. 2024© [employer]Date[employer] Inc. y angel afiliados y/o licenciantes. Todos los derechos reservados.  Copyright   © 2024 [employer] Todos los derechos reservados.  Patient Education     Dieta maxine en calcio   Acerca de alexis nima   El calcio es wes de los minerales más importantes y se encuentra en yolette todas las partes del cuerpo. Hace que angel dientes y huesos estén zeina y sanos. El cuerpo no produce suficiente insulina Es importante que coma alimentos ricos en calcio para obtener suficiente calcio. También ayuda al cuerpo a:  Producir coágulos de bret  Mantener los latidos normales  Ayudar a que la bret se mueva por todo el cuerpo  Liberar hormonas  Liberar enzimas  Enviar y recibir señales entre los nervios y el cerebro  Hacer que los músculos trabajen emiliana         ¿Cuáles serán los resultados?   Es importante tener tremayne buena cantidad de calcio en acosta dieta. El calcio ayudará al cuerpo a funcionar emiliana. Es muy importante en todas las etapas de la milvia. El calcio también puede evitar que pierda masa ósea. [place] se denomina osteopenia. Puede ayudar a evitar que tenga huesos débiles. [place] se llama osteoporosis.  ¿Qué medicamentos pueden ser necesarios?   El médico puede recetarle suplementos de calcio y vitamina D. Necesita vitamina D para ayudar a acosta cuerpo a absorber el calcio. Acosta suplemento de calcio puede contener vitamina D.  Consulte con acosta médico sobre:  Si está emiliana tana el calcio junto con los comidas.  Con qué frecuencia debe tana acosta suplemento de calcio donna el día.  Todos los medicamentos que está tomando. Asegúrese de incluir todos los medicamentos recetados y de venta ranjit (OTC), y los suplementos a base  de hierbas. Informe al médico sobre cualquier alergia a medicamentos. Lleve tremayne lista de los medicamentos que thi. Algunos medicamentos pueden causar problemas con la forma en que acosta cuerpo absorbe el calcio.  ¿Estará restringida la actividad física?   No, no se restringirá acosta actividad física. Es vásquez que practique ejercicios ligeros y se mantenga activo.  ¿Qué cambios en la dieta son necesarios?   Tendrá que deepa la cantidad de calcio en los alimentos que consume. El médico hablará con usted acerca de la cantidad adecuada de calcio para usted. La cantidad de calcio que necesita se basa en acosta edad y etapa de milvia.  ¿Cuándo debe realizarse esta dieta?   Esta dieta se utiliza cuando acosta dieta normal es baja en calcio. Es necesario aumentar la cantidad de calcio en acosta dieta. Nuestros cuerpos no absorben emiliana el calcio a medida que envejecemos.  ¿Quién no debe realizar esta dieta?   Las personas con exceso de calcio en la bret no deben usar esta dieta. [place] se llama hipercalcemia.  ¿Qué alimentos pueden comerse?   Los productos de leche, yogur y queso son naturalmente ricos en calcio.  Estos alimentos tienen cantidades pequeñas de calcio. Si se comen con frecuencia y en grandes cantidades, pueden ser buenas larios de calcio:  Ostras, frutas secas, verduras de hoja carlita, ronan brócoli, col, col rizada, hojas de mostaza, col china; frijoles de soja; naranjas  Salmón y nasir. Asegúrese de comer los cartílagos.  Frutas secas ronna almendras y nueces de Pam, semillas de girasol, tahina, frijoles secos  Productos alimenticios con calcio añadido ronan jugo de naranja, tofu, cereales, pan, leche de almendras, leche de arroz, leche de soya  ¿Qué alimentos deben comerse con moderación o deben evitarse?   Las personas que comen muchos tipos de alimentos no tienen que preocuparse por limitar o evitar ciertos alimentos.   ¿Qué problemas podrían surgir?   Algunas personas pueden tener cálculos renales. [place] puede ocurrir  después de tana grandes cantidades de suplementos de calcio donna un iliana tiempo. El calcio de los alimentos no parece causar cálculos renales.  Heces duras  Demasiado calcio puede interferir con la capacidad de acosta cuerpo para absorber autumn y zinc.  ¿Cuándo elissa llamar al médico?   Llame al médico si tiene dudas sobre acosta dieta. Informe a acosta médico si es alérgico a cualquier medicamento de la dieta.  Consejos útiles   Si tiene problemas para digerir la leche, pruebe la leche deslactosada. También puede usar algún producto para ayudarlo a absorber la lactosa.  Hable con el médico si usted es vegetariano y no consume productos lácteos. El médico puede ayudar a que usted obtenga la cantidad de calcio que el cuerpo necesita.  ¿Dónde puedo obtener más información?   Office of Dietary Supplements  http://ods.od.nih.gov/factsheets/calcium-QuickFacts   Exención de responsabilidad y uso de la información del consumidor   Esta información general es un resumen limitado de la información sobre el diagnóstico, el tratamiento y/o la medicación. No pretende ser exhaustivo y debe utilizarse ronan tremayne herramienta para ayudar al usuario a comprender y/o evaluar las posibles opciones de diagnóstico y tratamiento. NO incluye toda la información sobre las enfermedades, los tratamientos, los medicamentos, los efectos secundarios o los riesgos que pueden aplicarse a un paciente específico. No tiene por objeto ser un consejo médico ni un sustituto del consejo médico. [place] pretende reemplazar al diagnóstico o el tratamiento proporcionados por un proveedor de atención médica con base en el examen y la evaluación por parte de alexis proveedor de las circunstancias específicas y únicas de un paciente. Los pacientes deben hablar con un proveedor de atención médica para obtener información completa sobre acosta dharmesh, preguntas médicas y opciones de tratamiento, incluidos los riesgos o beneficios relacionados con el uso de medicamentos. Esta  información no respalda ningún tratamiento o medicamento ronan seguro, eficaz o aprobado para tratar a un paciente específico. [employer] y angel afiliados renuncian a cualquier garantía o responsabilidad relacionada con esta información o con el uso que se clovis de esta. El uso de esta información se rige por las Condiciones de uso, disponibles en [email or web address]/en/know/clinical-effectiveness-terms   Copyright   Copyright © 2024 [employer] Inc[employer] y angel licenciantes y/o afiliados. Todos los derechos reservados.  Patient Education     Ejercicio y movimiento   Conceptos Básicos   Redactado por los médicos y editores de [employer]   ¿Cuáles son los beneficios del movimiento? --  el cuerpo tiene muchos beneficios. Puede:   Quemar calorías, lo cual ayuda a controlar el peso   Ayudar a controlar los niveles de azúcar en bret en los diabéticos   Bajar la presión arterial, en especial en las personas con presión arterial kranthi   Disminuir el estrés y ayudar con la depresión y la ansiedad   Mantener los huesos zeina, para que no se debiliten y se rompan fácilmente   Disminuir las posibilidades de morir de enfermedades cardíacas  Incluso añadir pequeñas cantidades de actividad física a acosta rutina diaria puede mejorar acosta dharmesh.  ¿Cuáles son los principales tipos de ejercicio? -- Existen anatoliy tipos principales de ejercicio:   Ejercicio aeróbico - El ejercicio aeróbico aumenta la frecuencia cardíaca. Algunos ejemplos de ejercicio aeróbico son caminar, correr, bailar, montar en bicicleta o nadar.   Fortalecimiento muscular - El fortalecimiento muscular ayuda a fortalecer los músculos. Puede hacer alexis tipo de ejercicio con pesas, bandas de ejercicio o máquinas de pesas, También puede realizar alexis tipo de ejercicio usando acosta propio peso corporal, por ejemplo al hacer flexiones de brazos, o levantando objetos hogareños ronan jarras de agua.   Estiramiento - Los ejercicios de estiramiento permiten que los músculos y  las articulaciones se muevan más fácilmente.  Es importante incluir los anatoliy tipos de ejercicio en el programa de ejercicios, para que el organismo, los músculos y las articulaciones puedan mantenerse en el mejor estado posible.  ¿Madeline consultar a mi médico o enfermero antes de comenzar a hacer ejercicio? -- Si nunca hizo ejercicio o no lo ha hecho en mucho tiempo, consulte a acosta médico o enfermero antes de comenzar un programa de ejercicios muy activo.  Si tiene enfermedades cardíacas o factores de riesgo de enfermedades cardíacas (ronan por ejemplo presión arterial kranthi o diabetes), es posible que el médico o enfermero recomiende que realice tremayne prueba de ejercicios antes de iniciar un programa de ejercicios.  Al comenzar un programa de ejercicios, hágalo gradualmente. Por ejemplo, clovis los ejercicios a un ritmo lento o solo donna algunos minutos. Con el tiempo, podrá hacer los ejercicios más rápido o donna periodos más largos.  ¿Qué madeline poner en práctica al hacer ejercicio? -- Cada vez que clovis ejercicio, debe hacer lo siguiente:   Entrar en calor - [place] ayuda a evitar que se lesione los músculos al hacer ejercicio. Para entrar en calor, clovis un ejercicio aeróbico suave (por ejemplo, caminar lentamente) o ejercicios de estiramiento donna 5 a 10 minutos.   Hacer ejercicio - Debe tratar de combinar ejercicio aeróbico, ejercicio de fortalecimiento muscular y estiramiento. Donna el ejercicio aeróbico puede, por ejemplo, caminar rápido, nadar, correr o usar tremayne máquina de ejercicios. Otras actividades, ronan bailar o jugar al tenis, también son formas de ejercicio aeróbico. También debe tomarse el tiempo de estirar todas las articulaciones, incluidos el faheem, los hombros, la espalda, las caderas y las rodillas. Al menos dos veces por semana puede hacer ejercicios de fortalecimiento muscular ronan parte de acosta rutina.   Enfriar los músculos - Enfriar los músculos ayuda a evitar los mareos después del ejercicio y  a prevenir calambres. Para enfriar los músculos puede estirarse o hacer un ejercicio aeróbico suave donna fabiana minutos.  Algunas personas van al gimnasio o asisten a clases grupales de ejercicio. Sin embargo, puede hacer ejercicio incluso sin nada de eso. Hasta es posible hacer algunos ejercicios en espacios reducidos. También puede probar con videos en línea o aplicaciones para teléfonos inteligentes y deepa ideas sobre distintos tipos de ejercicio.  ¿Con qué frecuencia elissa hacer ejercicio? -- Los médicos recomiendan hacer ejercicio al menos 30 minutos por día, fabiana o más días por semana.  Si no puede hacer ejercicio donna 30 minutos consecutivos, trate de hacer ejercicio donna 10 minutos, anatoliy o cuatro veces al día. Incluso hacer ejercicio donna períodos más breves es vásquez para usted, especialmente si implica pasar menos tiempo sentado.  ¿Cuándo elissa llamar al médico o enfermero? -- Si tiene alguno de los siguientes síntomas al hacer ejercicio, interrumpa la actividad y llame a acosta médico o enfermero de inmediato:   Dolor o presión en el pecho, los brazos, la garganta, la mandíbula o la espalda   Náuseas o vómitos   Sensación de palpitaciones o de que el corazón late muy rápido   Sensación de mareo o desmayo  ¿Qué hago si no tengo tiempo para hacer ejercicio? -- Muchas personas llevan tremayne milvia muy ocupada y cayetano vez crean que no tienen tiempo para hacer ejercicio, donna es importante tratar de encontrar el tiempo, incluso si está cansado o trabaja mucho. El ejercicio puede aumentar acosta nivel de energía, lo que puede hacer que se sienta mejor y hasta podría ayudarlo a hacer más cosas.  Incluso si le resulta difícil dedicar mucho tiempo al ejercicio, puede mejorar acosta dharmesh si mueve acosta cuerpo un poco más. Hay muchas maneras de ser más activo. Por ejemplo, puede:   Usar las escaleras en lugar del ascensor.   Estacionar en un lugar que esté más lejos de la iker.   Elegir un trayecto más iliana al caminar de un  lugar a otro.  Pasar mucho tiempo sin moverse (por ejemplo, mirando televisión o trabajando en la computadora) es alicia para la dharmesh. Trate de levantarse y moverse cada vez que pueda. Incluso sesiones de movimiento breves, ronan therese caminatas cortas, hacer tareas hogareñas u ocuparse del jardín, pueden ayudar a mejorar la dharmesh. Buscar actividades que disfrute, o hacerlas con otras personas, puede ayudar a que incorpore más movimiento a acosta milvia diaria.  ¿Qué más elissa poner en práctica al hacer ejercicio? -- Para hacer ejercicio de manera to y evitar problemas, es importante:   Beber líquido donna el ejercicio y después de alexis (donna evite las bebidas que tienen mucha cafeína o azúcar).   Evitar hacer ejercicio al aire ranjit si hace mucho calor o mucho frío.   Usar capas de ropa, para poder quitarse prendas si tiene mucho calor.   Usar un calzado que ajuste emiliana y sea un buen soporte para los pies.   Tener conciencia del entorno si hace ejercicio al aire ranjit.  Todos los artículos se actualizan a medida que se descubre nueva evidencia y culmina nuestro proceso de evaluación por homólogos   Alexis artículo se recuperó de Up[employer] el: May 18, 2024.  Artículo 46814 Versión [id number]  Release: 32.4.3 - C32.137  © 2024 [employer] Todos los derechos reservados.  Exención de responsabilidad y uso de la información del consumidor   Descargo de responsabilidad: esta información generalizada es un resumen limitado de información sobre el diagnóstico, el tratamiento y/o los medicamentos. No pretende ser exhaustiva y se debe utilizar ronan herramienta para ayudar al usuario a comprender y/o evaluar las posibles opciones de diagnóstico y tratamiento. No incluye toda la información sobre afecciones, tratamientos, medicamentos, efectos secundarios o riesgos puedan ser aplicables a un paciente específico. No tiene el propósito de servir ronan recomendación médica ni de sustituir la recomendación médica, el diagnóstico o el  tratamiento de un profesional de atención médica que se base en el examen y la evaluación de alexis profesional de la dharmesh respecto a las circunstancias específicas y únicas del paciente. Los pacientes deben hablar con un profesional de atención médica para obtener información completa sobre acosta dharmesh, cuestiones médicas y opciones de tratamiento, incluidos los riesgos o los beneficios relacionados con el uso de medicamentos. Esta información no certifica que los tratamientos o medicamentos velia seguros, eficaces o estén aprobados para tratar a un paciente específico. [employer] y angel afiliados renuncian a cualquier garantía o responsabilidad relacionada con esta información o el uso de la misma.El uso de esta información está sujeto a las Condiciones de uso, disponibles en https://www.FORA.tver.com/en/know/clinical-effectiveness-terms. 2024© [employer] y angel afiliados y/o licenciantes. Todos los derechos reservados.  Copyright   © 2024 [employer] Todos los derechos reservados.

## 2025-05-22 ENCOUNTER — APPOINTMENT (OUTPATIENT)
Dept: LAB | Facility: HOSPITAL | Age: 60
End: 2025-05-22
Payer: COMMERCIAL

## 2025-05-23 ENCOUNTER — RESULTS FOLLOW-UP (OUTPATIENT)
Dept: FAMILY MEDICINE CLINIC | Facility: CLINIC | Age: 60
End: 2025-05-23

## 2025-05-27 NOTE — RESULT ENCOUNTER NOTE
Pt informed of lab results.     Pt states that they need a physical form completed for her work due on 6/10/25. Advised Pt to bring the form at their earliest convenience to ensure that it can be completed by that date.

## 2025-05-29 ENCOUNTER — TELEPHONE (OUTPATIENT)
Dept: FAMILY MEDICINE CLINIC | Facility: CLINIC | Age: 60
End: 2025-05-29

## 2025-05-29 NOTE — TELEPHONE ENCOUNTER
Physical Examination and TB Form for employment form received on 5/29/2025  to be completed by PCP. Copy made and placed in PCP folder.    Forms to be delivered to PCP mailbox at assigned time.

## 2025-05-30 ENCOUNTER — TELEPHONE (OUTPATIENT)
Age: 60
End: 2025-05-30

## 2025-05-30 NOTE — TELEPHONE ENCOUNTER
Patient calling for results of PAP, however it does not appear that one was done.  She is requesting a call back to advise.

## 2025-06-02 DIAGNOSIS — Z11.1 SCREENING-PULMONARY TB: Primary | ICD-10-CM

## 2025-06-02 NOTE — TELEPHONE ENCOUNTER
Taiwanese interpretor #[id number]    Left message requesting call back review response from Joanne MOSLEY.

## 2025-06-03 ENCOUNTER — HOSPITAL ENCOUNTER (OUTPATIENT)
Dept: RADIOLOGY | Facility: HOSPITAL | Age: 60
Discharge: HOME | End: 2025-06-03
Payer: COMMERCIAL

## 2025-06-03 ENCOUNTER — OFFICE VISIT (OUTPATIENT)
Dept: FAMILY MEDICINE CLINIC | Facility: CLINIC | Age: 60
End: 2025-06-03

## 2025-06-03 VITALS
HEIGHT: 61 IN | RESPIRATION RATE: 16 BRPM | OXYGEN SATURATION: 97 % | BODY MASS INDEX: 30.62 KG/M2 | DIASTOLIC BLOOD PRESSURE: 62 MMHG | HEART RATE: 76 BPM | TEMPERATURE: 97.1 F | SYSTOLIC BLOOD PRESSURE: 110 MMHG | WEIGHT: 162.2 LBS

## 2025-06-03 DIAGNOSIS — K21.9 GASTROESOPHAGEAL REFLUX DISEASE WITHOUT ESOPHAGITIS: ICD-10-CM

## 2025-06-03 DIAGNOSIS — E78.2 MIXED HYPERLIPIDEMIA: Primary | ICD-10-CM

## 2025-06-03 DIAGNOSIS — Z11.1 SCREENING FOR TUBERCULOSIS: ICD-10-CM

## 2025-06-03 DIAGNOSIS — Z02.89 ENCOUNTER FOR COMPLETION OF FORM WITH PATIENT: ICD-10-CM

## 2025-06-03 PROCEDURE — 71046 X-RAY EXAM CHEST 2 VIEWS: CPT

## 2025-06-03 PROCEDURE — 99213 OFFICE O/P EST LOW 20 MIN: CPT

## 2025-06-03 NOTE — PROGRESS NOTES
Name: Helga Berg      : 1965      MRN: [id number]  Encounter Provider: DIMITRI Oliveira  Encounter Date: 6/3/2025   Encounter department: [facility]  :  Assessment & Plan  Mixed hyperlipidemia  Lab Results   Component Value Date    CHOLESTEROL 260 (H) 2025    CHOLESTEROL 226 (H) 2024    CHOLESTEROL 235 (H) 10/18/2022     Lab Results   Component Value Date    HDL 62 2025    HDL 61 2024    HDL 52 10/18/2022     Lab Results   Component Value Date    TRIG 72 2025    TRIG 129 2024    TRIG 108 10/18/2022     Lab Results   Component Value Date    NONHDLC 198 2025    NONHDLC 183 10/18/2022    NONHDLC 188 2021     - Recommend he incorporate a heart healthy diet and lifestyle changes: decrease processed foods (cakes, cookies, chips, soda), decrease fried/fatty foods, increase fruits and vegetables, increase lean proteins (chicken, turkey), increase healthy fats (avocado, fish, nuts), drink plenty of water and increase activity.   - Will recheck lipid panel in a year.         Screening for tuberculosis    Orders:    XR chest pa and lateral; Future    Encounter for completion of form with patient  -Will order CXR for tb testing for work.   to  forms once CXR results are good        Gastroesophageal reflux disease without esophagitis  -Recommends eating small healthy meals, do not eat spicy, fatty or fried food. Decrease caffeine or carbonated drinks. Do not eat 2 to 3 hours before bedtime. Maintain healthy weight and importance of physical exercises.                  History of Present Illness   Helga Berg is a 59 y.o. with  has a past medical history of Arthritis, Cataract, GERD (gastroesophageal reflux disease), Prediabetes, Sinusitis, Thyroid cyst, and Thyroid nodule.     Patient is her for pre employment physical.       Review of Systems   Constitutional: Negative.  Negative for chills, fatigue and  "fever.   HENT: Negative.  Negative for ear pain and sore throat.    Eyes: Negative.  Negative for pain and visual disturbance.   Respiratory: Negative.  Negative for cough and shortness of breath.    Cardiovascular: Negative.  Negative for chest pain and palpitations.   Gastrointestinal: Negative.  Negative for abdominal pain and vomiting.   Genitourinary: Negative.  Negative for dysuria and hematuria.   Musculoskeletal: Negative.  Negative for arthralgias and back pain.   Skin: Negative.  Negative for color change and rash.   Neurological: Negative.  Negative for seizures and syncope.   Hematological: Negative.    Psychiatric/Behavioral: Negative.  Negative for behavioral problems.    All other systems reviewed and are negative.      Objective   /62 (BP Location: Right arm, Patient Position: Sitting, Cuff Size: Standard)   Pulse 76   Temp (!) 97.1 °F (36.2 °C) (Temporal)   Resp 16   Ht 5' 1\" (1.549 m)   Wt 73.6 kg (162 lb 3.2 oz)   SpO2 97%   BMI 30.65 kg/m²      Physical Exam  Vitals and nursing note reviewed.   Constitutional:       General: She is not in acute distress.     Appearance: Normal appearance. She is well-developed. She is obese.   HENT:      Head: Normocephalic and atraumatic.      Right Ear: Tympanic membrane normal.      Left Ear: Tympanic membrane normal.      Nose: Nose normal.      Mouth/Throat:      Mouth: Mucous membranes are moist.     Eyes:      Conjunctiva/sclera: Conjunctivae normal.       Cardiovascular:      Rate and Rhythm: Normal rate and regular rhythm.      Pulses: Normal pulses.      Heart sounds: Normal heart sounds. No murmur heard.  Pulmonary:      Effort: Pulmonary effort is normal. No respiratory distress.      Breath sounds: Normal breath sounds.   Abdominal:      General: Abdomen is flat. Bowel sounds are normal.      Palpations: Abdomen is soft.      Tenderness: There is no abdominal tenderness.     Musculoskeletal:         General: No swelling. Normal range of " motion.      Cervical back: Normal range of motion and neck supple.     Skin:     General: Skin is warm and dry.      Capillary Refill: Capillary refill takes less than 2 seconds.     Neurological:      General: No focal deficit present.      Mental Status: She is alert and oriented to person, place, and time. Mental status is at baseline.     Psychiatric:         Mood and Affect: Mood normal.         Behavior: Behavior normal.         Thought Content: Thought content normal.         Judgment: Judgment normal.

## 2025-06-03 NOTE — ASSESSMENT & PLAN NOTE
Lab Results   Component Value Date    CHOLESTEROL 260 (H) 05/22/2025    CHOLESTEROL 226 (H) 07/18/2024    CHOLESTEROL 235 (H) 10/18/2022     Lab Results   Component Value Date    HDL 62 05/22/2025    HDL 61 07/18/2024    HDL 52 10/18/2022     Lab Results   Component Value Date    TRIG 72 05/22/2025    TRIG 129 07/18/2024    TRIG 108 10/18/2022     Lab Results   Component Value Date    NONHDLC 198 05/22/2025    NONHDLC 183 10/18/2022    NONHDLC 188 08/03/2021     - Recommend he incorporate a heart healthy diet and lifestyle changes: decrease processed foods (cakes, cookies, chips, soda), decrease fried/fatty foods, increase fruits and vegetables, increase lean proteins (chicken, turkey), increase healthy fats (avocado, fish, nuts), drink plenty of water and increase activity.   - Will recheck lipid panel in a year.

## 2025-06-03 NOTE — ASSESSMENT & PLAN NOTE
-Recommends eating small healthy meals, do not eat spicy, fatty or fried food. Decrease caffeine or carbonated drinks. Do not eat 2 to 3 hours before bedtime. Maintain healthy weight and importance of physical exercises.

## 2025-06-04 ENCOUNTER — RESULTS FOLLOW-UP (OUTPATIENT)
Dept: FAMILY MEDICINE CLINIC | Facility: CLINIC | Age: 60
End: 2025-06-04

## 2025-06-04 NOTE — TELEPHONE ENCOUNTER
Patient notified form is ready for . Form scanned into patient chart.Form placed in  bin under letter D.

## 2025-06-11 NOTE — TELEPHONE ENCOUNTER
Patient notified form is ready for . Form faxed to [phone], confirmation received, Form scanned into patient chart  Form placed in  bin